# Patient Record
Sex: MALE | Employment: FULL TIME | URBAN - METROPOLITAN AREA
[De-identification: names, ages, dates, MRNs, and addresses within clinical notes are randomized per-mention and may not be internally consistent; named-entity substitution may affect disease eponyms.]

---

## 2017-03-04 ENCOUNTER — APPOINTMENT (OUTPATIENT)
Dept: GENERAL RADIOLOGY | Age: 37
End: 2017-03-04
Attending: EMERGENCY MEDICINE
Payer: COMMERCIAL

## 2017-03-04 ENCOUNTER — HOSPITAL ENCOUNTER (EMERGENCY)
Age: 37
Discharge: HOME | End: 2017-03-04
Attending: EMERGENCY MEDICINE
Payer: COMMERCIAL

## 2017-03-04 VITALS
TEMPERATURE: 98.8 F | OXYGEN SATURATION: 93 % | SYSTOLIC BLOOD PRESSURE: 108 MMHG | HEIGHT: 75 IN | WEIGHT: 209 LBS | DIASTOLIC BLOOD PRESSURE: 69 MMHG | RESPIRATION RATE: 16 BRPM | HEART RATE: 82 BPM | BODY MASS INDEX: 25.99 KG/M2

## 2017-03-04 DIAGNOSIS — S22.41XA CLOSED FRACTURE OF MULTIPLE RIBS OF RIGHT SIDE, INITIAL ENCOUNTER: ICD-10-CM

## 2017-03-04 DIAGNOSIS — S52.571A OTHER CLOSED INTRA-ARTICULAR FRACTURE OF DISTAL END OF RIGHT RADIUS, INITIAL ENCOUNTER: Primary | ICD-10-CM

## 2017-03-04 DIAGNOSIS — S82.54XA CLOSED NONDISPLACED FRACTURE OF MEDIAL MALLEOLUS OF RIGHT TIBIA, INITIAL ENCOUNTER: ICD-10-CM

## 2017-03-04 LAB
ALBUMIN SERPL BCP-MCNC: 4 G/DL (ref 3.5–5)
ALBUMIN/GLOB SERPL: 1.2 {RATIO} (ref 1.2–3.5)
ALP SERPL-CCNC: 59 U/L (ref 50–136)
ALT SERPL-CCNC: 30 U/L (ref 12–65)
ANION GAP BLD CALC-SCNC: 9 MMOL/L (ref 7–16)
AST SERPL W P-5'-P-CCNC: 23 U/L (ref 15–37)
BASOPHILS # BLD AUTO: 0.1 K/UL (ref 0–0.2)
BASOPHILS # BLD: 1 % (ref 0–2)
BILIRUB SERPL-MCNC: 0.9 MG/DL (ref 0.2–1.1)
BUN SERPL-MCNC: 8 MG/DL (ref 6–23)
CALCIUM SERPL-MCNC: 8.9 MG/DL (ref 8.3–10.4)
CHLORIDE SERPL-SCNC: 109 MMOL/L (ref 98–107)
CO2 SERPL-SCNC: 26 MMOL/L (ref 21–32)
CREAT SERPL-MCNC: 1 MG/DL (ref 0.8–1.5)
DIFFERENTIAL METHOD BLD: ABNORMAL
EOSINOPHIL # BLD: 0.5 K/UL (ref 0–0.8)
EOSINOPHIL NFR BLD: 3 % (ref 0.5–7.8)
ERYTHROCYTE [DISTWIDTH] IN BLOOD BY AUTOMATED COUNT: 13.5 % (ref 11.9–14.6)
ETHANOL SERPL-MCNC: 94 MG/DL
GLOBULIN SER CALC-MCNC: 3.4 G/DL (ref 2.3–3.5)
GLUCOSE SERPL-MCNC: 102 MG/DL (ref 65–100)
HCT VFR BLD AUTO: 45.4 % (ref 41.1–50.3)
HGB BLD-MCNC: 16.5 G/DL (ref 13.6–17.2)
IMM GRANULOCYTES # BLD: 0.1 K/UL (ref 0–0.5)
IMM GRANULOCYTES NFR BLD AUTO: 0.5 % (ref 0–5)
LYMPHOCYTES # BLD AUTO: 33 % (ref 13–44)
LYMPHOCYTES # BLD: 6.2 K/UL (ref 0.5–4.6)
MCH RBC QN AUTO: 34.3 PG (ref 26.1–32.9)
MCHC RBC AUTO-ENTMCNC: 36.3 G/DL (ref 31.4–35)
MCV RBC AUTO: 94.4 FL (ref 79.6–97.8)
MONOCYTES # BLD: 0.8 K/UL (ref 0.1–1.3)
MONOCYTES NFR BLD AUTO: 4 % (ref 4–12)
NEUTS SEG # BLD: 11.2 K/UL (ref 1.7–8.2)
NEUTS SEG NFR BLD AUTO: 59 % (ref 43–78)
PLATELET # BLD AUTO: 520 K/UL (ref 150–450)
PMV BLD AUTO: 9.2 FL (ref 10.8–14.1)
POTASSIUM SERPL-SCNC: 3.4 MMOL/L (ref 3.5–5.1)
PROT SERPL-MCNC: 7.4 G/DL (ref 6.3–8.2)
RBC # BLD AUTO: 4.81 M/UL (ref 4.23–5.67)
SODIUM SERPL-SCNC: 144 MMOL/L (ref 136–145)
WBC # BLD AUTO: 19 K/UL (ref 4.3–11.1)

## 2017-03-04 PROCEDURE — 80053 COMPREHEN METABOLIC PANEL: CPT | Performed by: EMERGENCY MEDICINE

## 2017-03-04 PROCEDURE — 74011250636 HC RX REV CODE- 250/636: Performed by: EMERGENCY MEDICINE

## 2017-03-04 PROCEDURE — 74011250637 HC RX REV CODE- 250/637: Performed by: EMERGENCY MEDICINE

## 2017-03-04 PROCEDURE — 80307 DRUG TEST PRSMV CHEM ANLYZR: CPT | Performed by: EMERGENCY MEDICINE

## 2017-03-04 PROCEDURE — 77030008298 HC SPLNT FBRGLS SCTCH 3M -A

## 2017-03-04 PROCEDURE — 74011250636 HC RX REV CODE- 250/636

## 2017-03-04 PROCEDURE — 85025 COMPLETE CBC W/AUTO DIFF WBC: CPT | Performed by: EMERGENCY MEDICINE

## 2017-03-04 PROCEDURE — 75810000053 HC SPLINT APPLICATION: Performed by: EMERGENCY MEDICINE

## 2017-03-04 PROCEDURE — 99284 EMERGENCY DEPT VISIT MOD MDM: CPT | Performed by: EMERGENCY MEDICINE

## 2017-03-04 PROCEDURE — 96374 THER/PROPH/DIAG INJ IV PUSH: CPT | Performed by: EMERGENCY MEDICINE

## 2017-03-04 PROCEDURE — 73090 X-RAY EXAM OF FOREARM: CPT

## 2017-03-04 PROCEDURE — 71101 X-RAY EXAM UNILAT RIBS/CHEST: CPT

## 2017-03-04 PROCEDURE — 96376 TX/PRO/DX INJ SAME DRUG ADON: CPT | Performed by: EMERGENCY MEDICINE

## 2017-03-04 PROCEDURE — 73110 X-RAY EXAM OF WRIST: CPT

## 2017-03-04 PROCEDURE — 96375 TX/PRO/DX INJ NEW DRUG ADDON: CPT | Performed by: EMERGENCY MEDICINE

## 2017-03-04 PROCEDURE — 73610 X-RAY EXAM OF ANKLE: CPT

## 2017-03-04 RX ORDER — OXYCODONE HYDROCHLORIDE 5 MG/1
10 TABLET ORAL
Qty: 20 TAB | Refills: 0 | Status: SHIPPED | OUTPATIENT
Start: 2017-03-04 | End: 2017-03-08

## 2017-03-04 RX ORDER — ONDANSETRON 2 MG/ML
4 INJECTION INTRAMUSCULAR; INTRAVENOUS
Status: COMPLETED | OUTPATIENT
Start: 2017-03-04 | End: 2017-03-04

## 2017-03-04 RX ORDER — HYDROMORPHONE HYDROCHLORIDE 1 MG/ML
1 INJECTION, SOLUTION INTRAMUSCULAR; INTRAVENOUS; SUBCUTANEOUS
Status: COMPLETED | OUTPATIENT
Start: 2017-03-04 | End: 2017-03-04

## 2017-03-04 RX ORDER — OXYCODONE HYDROCHLORIDE 15 MG/1
15 TABLET ORAL
Status: COMPLETED | OUTPATIENT
Start: 2017-03-04 | End: 2017-03-04

## 2017-03-04 RX ORDER — ONDANSETRON 2 MG/ML
INJECTION INTRAMUSCULAR; INTRAVENOUS
Status: COMPLETED
Start: 2017-03-04 | End: 2017-03-04

## 2017-03-04 RX ORDER — HYDROMORPHONE HYDROCHLORIDE 1 MG/ML
INJECTION, SOLUTION INTRAMUSCULAR; INTRAVENOUS; SUBCUTANEOUS
Status: COMPLETED
Start: 2017-03-04 | End: 2017-03-04

## 2017-03-04 RX ADMIN — OXYCODONE HYDROCHLORIDE 15 MG: 15 TABLET ORAL at 17:57

## 2017-03-04 RX ADMIN — ONDANSETRON 4 MG: 2 INJECTION, SOLUTION INTRAMUSCULAR; INTRAVENOUS at 16:07

## 2017-03-04 RX ADMIN — HYDROMORPHONE HYDROCHLORIDE 1 MG: 1 INJECTION, SOLUTION INTRAMUSCULAR; INTRAVENOUS; SUBCUTANEOUS at 17:08

## 2017-03-04 RX ADMIN — HYDROMORPHONE HYDROCHLORIDE 1 MG: 1 INJECTION, SOLUTION INTRAMUSCULAR; INTRAVENOUS; SUBCUTANEOUS at 16:08

## 2017-03-04 RX ADMIN — ONDANSETRON 4 MG: 2 INJECTION INTRAMUSCULAR; INTRAVENOUS at 16:07

## 2017-03-04 NOTE — DISCHARGE INSTRUCTIONS
Rest.  Elevate arm and leg when sitting or lying down. Ice if needed. Pain medication as needed. Call orthopedist Monday for appointment for follow-up. May use pillow to help splint chest wall when coughing or taking a deep breath. Recheck sooner for shortness of breath coughing up blood fever or much worse pain. Broken Wrist: Care Instructions  Your Care Instructions    Your wrist can break, or fracture, during sports, a fall, or other accidents. The break may happen when your wrist is hit or is used to protect you in a fall. Fractures can range from a small, hairline crack, to a bone or bones broken into two or more pieces. Your treatment depends on how bad the break is. Your doctor may have put your wrist in a cast or splint. This will help keep your wrist stable until your follow-up appointment. It may take weeks or months for your wrist to heal. You can help it heal with care at home. You heal best when you take good care of yourself. Eat a variety of healthy foods, and don't smoke. Follow-up care is a key part of your treatment and safety. Be sure to make and go to all appointments, and call your doctor if you are having problems. It's also a good idea to know your test results and keep a list of the medicines you take. How can you care for yourself at home? · Put ice or a cold pack on your wrist for 10 to 20 minutes at a time. Try to do this every 1 to 2 hours for the next 3 days (when you are awake). Put a thin cloth between the ice and your cast or splint. Keep your cast or splint dry. · Follow the splint or cast care instructions your doctor gives you. If you have a splint, do not take it off unless your doctor tells you to. Be careful not to put the splint on too tight. · Be safe with medicines. Take pain medicines exactly as directed. ¨ If the doctor gave you a prescription medicine for pain, take it as prescribed.   ¨ If you are not taking a prescription pain medicine, ask your doctor if you can take an over-the-counter medicine. · Prop up your wrist on pillows when you sit or lie down in the first few days after the injury. Keep your wrist higher than the level of your heart. This will help reduce swelling. · Move your fingers often to reduce swelling and stiffness, but do not use that hand to grab or carry anything. · Follow instructions for exercises to keep your arm strong. When should you call for help? Call your doctor now or seek immediate medical care if:  · You have increased or severe pain. · Your cast or splint feels too tight. · You cannot move your fingers. · You have tingling, weakness, or numbness in your hand and fingers. · Your hand and fingers are cool or pale or change color. · You have a lot of swelling near your cast or splint. · The skin under your cast or splint is burning or stinging. Watch closely for changes in your health, and be sure to contact your doctor if:  · You do not get better as expected. Where can you learn more? Go to http://ju-caitlyn.info/. Enter [id number] in the search box to learn more about \"Broken Wrist: Care Instructions. \"  Current as of: May 23, 2016  Content Version: 11.1  © 9533-7768 [employer]. Care instructions adapted under license by [employer] (which disclaims liability or warranty for this information). If you have questions about a medical condition or this instruction, always ask your healthcare professional. [place] [address] any warranty or liability for your use of this information. Broken Ankle: Care Instructions  Your Care Instructions    An ankle may break (fracture) during sports, a fall, or other accidents. Fractures can range from a small, hairline crack, to a bone or bones broken into two or more pieces. Your treatment depends on how bad the break is.   Your doctor may have put your ankle in a splint or cast to allow it to heal or to keep it stable until you see another doctor. It may take weeks or months for your ankle to heal. You can help your ankle heal with some care at home. You heal best when you take good care of yourself. Eat a variety of healthy foods, and don't smoke. You may have had a sedative to help you relax. You may be unsteady after having sedation. It can take a few hours for the medicine's effects to wear off. Common side effects of sedation include nausea, vomiting, and feeling sleepy or tired. The doctor has checked you carefully, but problems can develop later. If you notice any problems or new symptoms, get medical treatment right away. Follow-up care is a key part of your treatment and safety. Be sure to make and go to all appointments, and call your doctor if you are having problems. It's also a good idea to know your test results and keep a list of the medicines you take. How can you care for yourself at home? · If the doctor gave you a sedative:  ¨ For 24 hours, don't do anything that requires attention to detail. It takes time for the medicine's effects to completely wear off. ¨ For your safety, do not drive or operate any machinery that could be dangerous. Wait until the medicine wears off and you can think clearly and react easily. · Put ice or a cold pack on your ankle for 10 to 20 minutes at a time. Try to do this every 1 to 2 hours for the next 3 days (when you are awake). Put a thin cloth between the ice and your cast or splint. Keep your cast or splint dry. · Follow the cast care instructions your doctor gives you. If you have a splint, do not take it off unless your doctor tells you to. · Be safe with medicines. Take pain medicines exactly as directed. ¨ If the doctor gave you a prescription medicine for pain, take it as prescribed. ¨ If you are not taking a prescription pain medicine, ask your doctor if you can take an over-the-counter medicine.   · Prop up your leg on pillows in the first few days after the injury. Keep the ankle higher than the level of your heart. This will help reduce swelling. · Do not put weight on your ankle unless your doctor tells you to. Use crutches to walk. · Follow instructions for exercises to keep your leg strong. · Wiggle your toes often to reduce swelling and stiffness. When should you call for help? Call 911 anytime you think you may need emergency care. For example, call if:  · You have trouble breathing. · You passed out (lost consciousness). · You have sudden chest pain and shortness of breath, or you cough up blood. Call your doctor now or seek immediate medical care if:  · You have new or worse nausea or vomiting. · You have increased or severe pain. · Your foot is cool or pale or changes color. · You have tingling, weakness, or numbness in your toes. · Your cast or splint feels too tight. · You cannot move your toes. · You have signs of a blood clot, such as:  ¨ Pain in your calf, back of the knee, thigh, or groin. ¨ Redness and swelling in your leg or groin. · The skin under your cast or splint is burning or stinging. Watch closely for changes in your health, and be sure to contact your doctor if:  · You do not get better as expected. Where can you learn more? Go to http://ju-caitlyn.info/. Enter P763 in the search box to learn more about \"Broken Ankle: Care Instructions. \"  Current as of: May 23, 2016  Content Version: 11.1  © 4537-2550 [employer]. Care instructions adapted under license by [employer] (which disclaims liability or warranty for this information). If you have questions about a medical condition or this instruction, always ask your healthcare professional. [place] [address] any warranty or liability for your use of this information. Broken Rib: Care Instructions  Your Care Instructions    A broken rib is a crack or break in one of the bones of the rib cage.  Breathing can be very painful because the muscles used for breathing pull on the rib. In most cases, a broken rib will heal on its own. You can take pain medicine while the rib mends. Pain relief allows you to take deep breaths. In the past, doctors recommended taping or wrapping broken ribs. This is no longer done because taping makes it hard for you to take deep breaths. You need to take deep breaths at least once an hour to prevent pneumonia or a partial collapse of a lung. Your rib will heal in about 6 weeks. You heal best when you take good care of yourself. Eat a variety of healthy foods, and don't smoke. Follow-up care is a key part of your treatment and safety. Be sure to make and go to all appointments, and call your doctor if you are having problems. It's also a good idea to know your test results and keep a list of the medicines you take. How can you care for yourself at home? · Be safe with medicines. Take pain medicines exactly as directed. ¨ If the doctor gave you a prescription medicine for pain, take it as prescribed. ¨ If you are not taking a prescription pain medicine, ask your doctor if you can take an over-the-counter medicine. · Even if it hurts, cough or take the deepest breath you can at least once every hour. This will get air deeply into your lungs and reduce your chance of getting pneumonia or a partial collapse of a lung. Hold a pillow against your chest to make this less painful. · Put ice or a cold pack on the area for 10 to 20 minutes at a time. Put a thin cloth between the ice and your skin. When should you call for help? Call 911 anytime you think you may need emergency care. For example, call if:  · You have sudden chest pain and shortness of breath, or you cough up blood. Call your doctor now or seek immediate medical care if:  · You have some trouble breathing. · You have a fever. · You have a cough that gets worse.  For example, you have a dry cough that turns into a wet cough, bringing up mucus from the lungs. Watch closely for changes in your health, and be sure to contact your doctor if:  · You have pain even after taking your medicine. · You do not get better as expected. Where can you learn more? Go to http://ju-caitlyn.info/. Enter M135 in the search box to learn more about \"Broken Rib: Care Instructions. \"  Current as of: May 27, 2016  Content Version: 11.1  © 7199-0855 [employer]. Care instructions adapted under license by [employer] (which disclaims liability or warranty for this information). If you have questions about a medical condition or this instruction, always ask your healthcare professional. [address] any warranty or liability for your use of this information.

## 2017-03-04 NOTE — ED NOTES
I have reviewed discharge instructions with the patient. The patient verbalized understanding. Prescription given to pt. Patient's father transporting pt home.

## 2017-03-04 NOTE — ED TRIAGE NOTES
Reports fell about 20 feet out of a barn. Reports pain to right side of ribs, right wrist, and right ankle. Denies neck or back pain. Has Spirocytosis.

## 2017-03-04 NOTE — ED PROVIDER NOTES
HPI Comments: 43-year-old male fell about 20 feet out of a loft of a barn. Landed on his right side. Denies any head impact or loss of consciousness. Complains of pain to the right forearm and wrist, right ribs, right ankle. Denies any back anterior chest or abdominal pain. Pain mildly worse with breathing. Has a history of splenectomy due to a hereditary spherocytosis. Patient is a 29 y.o. male presenting with fall. The history is provided by the patient. Fall   The accident occurred less than 1 hour ago. The fall occurred while standing. He fell from a height of 16 - 20 ft. He landed on concrete. There was no blood loss. The point of impact was the right wrist. The pain is moderate. He was ambulatory at the scene. Pertinent negatives include no fever, no numbness, no abdominal pain, no nausea, no vomiting, no hematuria, no headaches, no extremity weakness, no loss of consciousness, no tingling and no laceration. He has tried nothing for the symptoms. Past Medical History:   Diagnosis Date    Hypertension     NSTEMI (non-ST elevated myocardial infarction) ([facility] [address] 8/14/2014    Other ill-defined conditions(799.89)     spirocytosis, high cholesterol    URI (upper respiratory infection) 8/14/2014       Past Surgical History:   Procedure Laterality Date    ABDOMEN SURGERY PROC UNLISTED      spleen    HX APPENDECTOMY      HX CHOLECYSTECTOMY      HX GI      spleen removed    HX HEENT      tonsils         Family History:   Problem Relation Age of Onset    Heart Disease Mother     Hypertension Mother     Heart Disease Father     Hypertension Father     Stroke Maternal Grandfather     Heart Disease Paternal Grandfather     Diabetes Paternal Grandfather        Social History     Social History    Marital status:      Spouse name: N/A    Number of children: N/A    Years of education: N/A     Occupational History    Not on file.      Social History Main Topics    Smoking status: Current Every Day Smoker     Packs/day: 2.00     Years: 20.00    Smokeless tobacco: Never Used    Alcohol use 0.0 oz/week      Comment: occasionally    Drug use: No    Sexual activity: Yes     Partners: Female     Other Topics Concern    Not on file     Social History Narrative         ALLERGIES: Aspirin    Review of Systems   Constitutional: Negative for chills and fever. HENT: Negative for ear pain. Eyes: Negative for visual disturbance. Respiratory: Negative for shortness of breath. Cardiovascular: Negative for chest pain. Gastrointestinal: Negative for abdominal pain, nausea and vomiting. Genitourinary: Positive for flank pain. Negative for hematuria. Musculoskeletal: Negative for back pain, extremity weakness and neck pain. Neurological: Negative for dizziness, tingling, loss of consciousness, light-headedness, numbness and headaches. Vitals:    03/04/17 1554   BP: 135/69   Pulse: (!) 101   Resp: 20   Temp: 98.8 °F (37.1 °C)   Weight: 94.8 kg (209 lb)   Height: 6' 2.5\" (1.892 m)            Physical Exam   Constitutional: He is oriented to person, place, and time. He appears well-developed and well-nourished. No distress. HENT:   Head: Normocephalic and atraumatic. Mouth/Throat: Oropharynx is clear and moist.   Eyes: EOM are normal. Pupils are equal, round, and reactive to light. Neck: Normal range of motion. Neck supple. No muscular tenderness present. Cardiovascular: Normal rate, regular rhythm and normal heart sounds. Pulmonary/Chest: Effort normal and breath sounds normal.       Abdominal: Soft. He exhibits no distension. There is no tenderness. Musculoskeletal:        Right shoulder: Normal.        Right elbow: Normal.       Right wrist: He exhibits decreased range of motion, tenderness, bony tenderness and swelling. Right hip: Normal.        Right knee: Normal.        Right ankle: He exhibits decreased range of motion and swelling. He exhibits normal pulse. Tenderness. Lateral malleolus and medial malleolus tenderness found. Left forearm: He exhibits tenderness and bony tenderness. He exhibits no swelling and no laceration. Neurological: He is alert and oriented to person, place, and time. Speech normal   Skin: No laceration noted. Nursing note and vitals reviewed. MDM  Number of Diagnoses or Management Options  Diagnosis management comments: No obvious evidence for head injury or intrathoracic or intra-abdominal injuries. Will reexamine. At this point concern for fractured right wrist and ankle. Also films of right ribs to assess for rib fractures and pneumothorax. Pain control. Amount and/or Complexity of Data Reviewed  Clinical lab tests: ordered and reviewed  Tests in the radiology section of CPT®: ordered and reviewed  Independent visualization of images, tracings, or specimens: yes    Risk of Complications, Morbidity, and/or Mortality  Presenting problems: moderate  Diagnostic procedures: low  Management options: moderate    Patient Progress  Patient progress: stable    ED Course       Procedures    Xr Forearm Rt Ap/lat    Result Date: 3/4/2017  Exam:  Right forearm and wrist radiographs History:  pain, R arm pain, 36 years Male Reports fell about 20 feet out of a barn. Reports pain to right side of ribs, right wrist, and right ankle Pt. Unable to have complete forearm lateral, had to do elbow and wrist lateral separately Comparison: None available Findings: There is a comminuted essentially nondisplaced fracture of the distal radial epiphysis extending into the metaphysis and radiocarpal joint. The visualized carpal bones appear otherwise intact. The visualized ulna and proximal radius appear intact. Normal alignment. Normal mineralization. Visualized soft tissues otherwise unremarkable. Impression: Acute comminuted nondisplaced fracture of the distal radial epiphysis.      Xr Wrist Rt Ap/lat/obl Min 3v    Result Date: 3/4/2017  Exam:  Right forearm and wrist radiographs History:  pain, R arm pain, 36 years Male Reports fell about 20 feet out of a barn. Reports pain to right side of ribs, right wrist, and right ankle Pt. Unable to have complete forearm lateral, had to do elbow and wrist lateral separately Comparison: None available Findings: There is a comminuted essentially nondisplaced fracture of the distal radial epiphysis extending into the metaphysis and radiocarpal joint. The visualized carpal bones appear otherwise intact. The visualized ulna and proximal radius appear intact. Normal alignment. Normal mineralization. Visualized soft tissues otherwise unremarkable. Impression: Acute comminuted nondisplaced fracture of the distal radial epiphysis. Xr Ankle Rt Min 3 V    Result Date: 3/4/2017  Exam:  Right ankle radiographs History:  pain, ankle trauma, 29 years Male Reports fell about 20 feet out of a barn. Reports pain to right side of ribs, right wrist, and right ankle Comparison: None available Findings: There appears to be an acute nondisplaced fracture of the medial malleolus, with associated mild medial ankle soft tissue swelling. The visualized distal tibia and talar dome appear intact. Normal alignment. Normal mineralization. Possible small ankle joint effusion. Visualized soft tissues otherwise unremarkable. Impression: Acute nondisplaced fracture of the medial malleolus. Xr Ribs Lt W Pa Cxr Min 3 V    Result Date: 3/4/2017  PA chest and right ribs radiographs History: Right chest injury, 29 years Male Reports fell about 20 feet out of a barn. Reports pain to right side of ribs, right wrist, and right ankle Best images possible, pt. In a lot of pain, limited movement Comparison: Chest radiograph January 11, 2016 Findings:   Normal cardiomediastinal silhouette. Decreased lung volumes. Mild subsegmental atelectasis bilateral lung bases.   No evidence of pneumothorax, pleural effusion, or air space consolidation. There appear to be acute nondisplaced fractures of the lateral right seventh through 10th ribs. Visualized soft tissue and osseous structures otherwise unremarkable. Impression: Decreased lung volumes. Acute nondisplaced fractures of the right lateral seventh through 10th ribs. Results Include:    Recent Results (from the past 24 hour(s))   CBC WITH AUTOMATED DIFF    Collection Time: 03/04/17  4:00 PM   Result Value Ref Range    WBC 19.0 (H) 4.3 - 11.1 K/uL    RBC 4.81 4.23 - 5.67 M/uL    HGB 16.5 13.6 - 17.2 g/dL    HCT 45.4 41.1 - 50.3 %    MCV 94.4 79.6 - 97.8 FL    MCH 34.3 (H) 26.1 - 32.9 PG    MCHC 36.3 (H) 31.4 - 35.0 g/dL    RDW 13.5 11.9 - 14.6 %    PLATELET 693 (H) 098 - 450 K/uL    MPV 9.2 (L) 10.8 - 14.1 FL    DF AUTOMATED      NEUTROPHILS 59 43 - 78 %    LYMPHOCYTES 33 13 - 44 %    MONOCYTES 4 4.0 - 12.0 %    EOSINOPHILS 3 0.5 - 7.8 %    BASOPHILS 1 0.0 - 2.0 %    IMMATURE GRANULOCYTES 0.5 0.0 - 5.0 %    ABS. NEUTROPHILS 11.2 (H) 1.7 - 8.2 K/UL    ABS. LYMPHOCYTES 6.2 (H) 0.5 - 4.6 K/UL    ABS. MONOCYTES 0.8 0.1 - 1.3 K/UL    ABS. EOSINOPHILS 0.5 0.0 - 0.8 K/UL    ABS. BASOPHILS 0.1 0.0 - 0.2 K/UL    ABS. IMM. GRANS. 0.1 0.0 - 0.5 K/UL   METABOLIC PANEL, COMPREHENSIVE    Collection Time: 03/04/17  4:00 PM   Result Value Ref Range    Sodium 144 136 - 145 mmol/L    Potassium 3.4 (L) 3.5 - 5.1 mmol/L    Chloride 109 (H) 98 - 107 mmol/L    CO2 26 21 - 32 mmol/L    Anion gap 9 7 - 16 mmol/L    Glucose 102 (H) 65 - 100 mg/dL    BUN 8 6 - 23 MG/DL    Creatinine 1.00 0.8 - 1.5 MG/DL    GFR est AA >60 >60 ml/min/1.73m2    GFR est non-AA >60 >60 ml/min/1.73m2    Calcium 8.9 8.3 - 10.4 MG/DL    Bilirubin, total 0.9 0.2 - 1.1 MG/DL    ALT (SGPT) 30 12 - 65 U/L    AST (SGOT) 23 15 - 37 U/L    Alk.  phosphatase 59 50 - 136 U/L    Protein, total 7.4 6.3 - 8.2 g/dL    Albumin 4.0 3.5 - 5.0 g/dL    Globulin 3.4 2.3 - 3.5 g/dL    A-G Ratio 1.2 1.2 - 3.5     ETHYL ALCOHOL    Collection Time: 03/04/17  4:00 PM   Result Value Ref Range    ALCOHOL(ETHYL),SERUM 94 MG/DL     Spoke with orthopedics, they desire stirrup splint and sugar tong splint. Follow-up with office the beginning of the week. Suspect he will need wrist surgery.

## 2017-03-07 ENCOUNTER — ANESTHESIA EVENT (OUTPATIENT)
Dept: SURGERY | Age: 37
End: 2017-03-07
Payer: COMMERCIAL

## 2017-03-08 ENCOUNTER — SURGERY (OUTPATIENT)
Age: 37
End: 2017-03-08

## 2017-03-08 ENCOUNTER — APPOINTMENT (OUTPATIENT)
Dept: GENERAL RADIOLOGY | Age: 37
End: 2017-03-08
Attending: ORTHOPAEDIC SURGERY
Payer: COMMERCIAL

## 2017-03-08 ENCOUNTER — HOSPITAL ENCOUNTER (OUTPATIENT)
Age: 37
Setting detail: OUTPATIENT SURGERY
Discharge: HOME | End: 2017-03-08
Attending: ORTHOPAEDIC SURGERY | Admitting: ORTHOPAEDIC SURGERY
Payer: COMMERCIAL

## 2017-03-08 ENCOUNTER — ANESTHESIA (OUTPATIENT)
Dept: SURGERY | Age: 37
End: 2017-03-08
Payer: COMMERCIAL

## 2017-03-08 VITALS
WEIGHT: 198 LBS | SYSTOLIC BLOOD PRESSURE: 110 MMHG | RESPIRATION RATE: 16 BRPM | DIASTOLIC BLOOD PRESSURE: 68 MMHG | BODY MASS INDEX: 24.1 KG/M2 | OXYGEN SATURATION: 95 % | HEART RATE: 78 BPM | TEMPERATURE: 97.7 F

## 2017-03-08 PROCEDURE — 77030002966 HC SUT PDS J&J -A: Performed by: ORTHOPAEDIC SURGERY

## 2017-03-08 PROCEDURE — 74011250637 HC RX REV CODE- 250/637: Performed by: ANESTHESIOLOGY

## 2017-03-08 PROCEDURE — 77030018836 HC SOL IRR NACL ICUM -A: Performed by: ORTHOPAEDIC SURGERY

## 2017-03-08 PROCEDURE — 76060000034 HC ANESTHESIA 1.5 TO 2 HR: Performed by: ORTHOPAEDIC SURGERY

## 2017-03-08 PROCEDURE — 74011000250 HC RX REV CODE- 250

## 2017-03-08 PROCEDURE — 77030020275 HC MISC ORTHOPEDIC: Performed by: ORTHOPAEDIC SURGERY

## 2017-03-08 PROCEDURE — C1713 ANCHOR/SCREW BN/BN,TIS/BN: HCPCS | Performed by: ORTHOPAEDIC SURGERY

## 2017-03-08 PROCEDURE — 76210000006 HC OR PH I REC 0.5 TO 1 HR: Performed by: ORTHOPAEDIC SURGERY

## 2017-03-08 PROCEDURE — 74011250636 HC RX REV CODE- 250/636

## 2017-03-08 PROCEDURE — 77030003602 HC NDL NRV BLK BBMI -B: Performed by: ANESTHESIOLOGY

## 2017-03-08 PROCEDURE — 77030027789 HC PLT VOLR DST PEG TRIM -G: Performed by: ORTHOPAEDIC SURGERY

## 2017-03-08 PROCEDURE — 77030020274 HC MISC IMPL ORTHOPEDIC: Performed by: ORTHOPAEDIC SURGERY

## 2017-03-08 PROCEDURE — 77030020754 HC CUF TRNQT 2BLA STRY -B: Performed by: ORTHOPAEDIC SURGERY

## 2017-03-08 PROCEDURE — 74011250636 HC RX REV CODE- 250/636: Performed by: ANESTHESIOLOGY

## 2017-03-08 PROCEDURE — 77030008367 HC SPLNT ORTHGLS BSNM -A: Performed by: ORTHOPAEDIC SURGERY

## 2017-03-08 PROCEDURE — 77030011640 HC PAD GRND REM COVD -A: Performed by: ORTHOPAEDIC SURGERY

## 2017-03-08 PROCEDURE — 77030021774 HC PLT PEG WRST TRIM -B: Performed by: ORTHOPAEDIC SURGERY

## 2017-03-08 PROCEDURE — 77030002916 HC SUT ETHLN J&J -A: Performed by: ORTHOPAEDIC SURGERY

## 2017-03-08 PROCEDURE — 74011250636 HC RX REV CODE- 250/636: Performed by: ORTHOPAEDIC SURGERY

## 2017-03-08 PROCEDURE — 76010000161 HC OR TIME 1 TO 1.5 HR INTENSV-TIER 1: Performed by: ORTHOPAEDIC SURGERY

## 2017-03-08 PROCEDURE — 76942 ECHO GUIDE FOR BIOPSY: CPT | Performed by: ORTHOPAEDIC SURGERY

## 2017-03-08 PROCEDURE — 76010010054 HC POST OP PAIN BLOCK: Performed by: ORTHOPAEDIC SURGERY

## 2017-03-08 PROCEDURE — 76210000020 HC REC RM PH II FIRST 0.5 HR: Performed by: ORTHOPAEDIC SURGERY

## 2017-03-08 PROCEDURE — 73110 X-RAY EXAM OF WRIST: CPT

## 2017-03-08 DEVICE — PLATE VOLAR 4 DIS PEG 3HLE LF --: Type: IMPLANTABLE DEVICE | Site: ARM | Status: FUNCTIONAL

## 2017-03-08 DEVICE — PEG WRST THRD VOLAR 16MM --: Type: IMPLANTABLE DEVICE | Site: ARM | Status: FUNCTIONAL

## 2017-03-08 DEVICE — SCR BNE CORT HEX 3.2X14MM --: Type: IMPLANTABLE DEVICE | Site: ARM | Status: FUNCTIONAL

## 2017-03-08 DEVICE — IMPLANTABLE DEVICE: Type: IMPLANTABLE DEVICE | Site: ARM | Status: FUNCTIONAL

## 2017-03-08 DEVICE — PEG WRST THRD VOLAR 20MM --: Type: IMPLANTABLE DEVICE | Site: ARM | Status: FUNCTIONAL

## 2017-03-08 DEVICE — PEG WRST THRD VOLAR 18MM --: Type: IMPLANTABLE DEVICE | Site: ARM | Status: FUNCTIONAL

## 2017-03-08 RX ORDER — HYDROCODONE BITARTRATE AND ACETAMINOPHEN 7.5; 325 MG/1; MG/1
1 TABLET ORAL AS NEEDED
Status: DISCONTINUED | OUTPATIENT
Start: 2017-03-08 | End: 2017-03-08 | Stop reason: HOSPADM

## 2017-03-08 RX ORDER — MIDAZOLAM HYDROCHLORIDE 1 MG/ML
2 INJECTION, SOLUTION INTRAMUSCULAR; INTRAVENOUS
Status: DISCONTINUED | OUTPATIENT
Start: 2017-03-08 | End: 2017-03-08 | Stop reason: HOSPADM

## 2017-03-08 RX ORDER — SODIUM CHLORIDE 0.9 % (FLUSH) 0.9 %
5-10 SYRINGE (ML) INJECTION AS NEEDED
Status: DISCONTINUED | OUTPATIENT
Start: 2017-03-08 | End: 2017-03-08 | Stop reason: HOSPADM

## 2017-03-08 RX ORDER — FAMOTIDINE 20 MG/1
20 TABLET, FILM COATED ORAL ONCE
Status: COMPLETED | OUTPATIENT
Start: 2017-03-08 | End: 2017-03-08

## 2017-03-08 RX ORDER — HYDROMORPHONE HYDROCHLORIDE 2 MG/ML
0.5 INJECTION, SOLUTION INTRAMUSCULAR; INTRAVENOUS; SUBCUTANEOUS
Status: DISCONTINUED | OUTPATIENT
Start: 2017-03-08 | End: 2017-03-08 | Stop reason: HOSPADM

## 2017-03-08 RX ORDER — METOPROLOL TARTRATE 50 MG/1
50 TABLET ORAL ONCE
Status: COMPLETED | OUTPATIENT
Start: 2017-03-08 | End: 2017-03-08

## 2017-03-08 RX ORDER — SODIUM CHLORIDE 0.9 % (FLUSH) 0.9 %
5-10 SYRINGE (ML) INJECTION EVERY 8 HOURS
Status: DISCONTINUED | OUTPATIENT
Start: 2017-03-08 | End: 2017-03-08 | Stop reason: HOSPADM

## 2017-03-08 RX ORDER — PROPOFOL 10 MG/ML
INJECTION, EMULSION INTRAVENOUS
Status: DISCONTINUED | OUTPATIENT
Start: 2017-03-08 | End: 2017-03-08 | Stop reason: HOSPADM

## 2017-03-08 RX ORDER — FENTANYL CITRATE 50 UG/ML
INJECTION, SOLUTION INTRAMUSCULAR; INTRAVENOUS AS NEEDED
Status: DISCONTINUED | OUTPATIENT
Start: 2017-03-08 | End: 2017-03-08 | Stop reason: HOSPADM

## 2017-03-08 RX ORDER — CEFAZOLIN SODIUM IN 0.9 % NACL 2 G/50 ML
2 INTRAVENOUS SOLUTION, PIGGYBACK (ML) INTRAVENOUS ONCE
Status: COMPLETED | OUTPATIENT
Start: 2017-03-08 | End: 2017-03-08

## 2017-03-08 RX ORDER — SODIUM CHLORIDE 9 MG/ML
25 INJECTION, SOLUTION INTRAVENOUS CONTINUOUS
Status: DISCONTINUED | OUTPATIENT
Start: 2017-03-08 | End: 2017-03-08 | Stop reason: HOSPADM

## 2017-03-08 RX ORDER — SODIUM CHLORIDE, SODIUM LACTATE, POTASSIUM CHLORIDE, CALCIUM CHLORIDE 600; 310; 30; 20 MG/100ML; MG/100ML; MG/100ML; MG/100ML
100 INJECTION, SOLUTION INTRAVENOUS CONTINUOUS
Status: DISCONTINUED | OUTPATIENT
Start: 2017-03-08 | End: 2017-03-08 | Stop reason: HOSPADM

## 2017-03-08 RX ORDER — OXYCODONE HYDROCHLORIDE 5 MG/1
5 TABLET ORAL
Status: DISCONTINUED | OUTPATIENT
Start: 2017-03-08 | End: 2017-03-08 | Stop reason: HOSPADM

## 2017-03-08 RX ORDER — NALOXONE HYDROCHLORIDE 0.4 MG/ML
0.1 INJECTION, SOLUTION INTRAMUSCULAR; INTRAVENOUS; SUBCUTANEOUS AS NEEDED
Status: DISCONTINUED | OUTPATIENT
Start: 2017-03-08 | End: 2017-03-08 | Stop reason: HOSPADM

## 2017-03-08 RX ORDER — LIDOCAINE HYDROCHLORIDE 10 MG/ML
0.1 INJECTION INFILTRATION; PERINEURAL AS NEEDED
Status: DISCONTINUED | OUTPATIENT
Start: 2017-03-08 | End: 2017-03-08 | Stop reason: HOSPADM

## 2017-03-08 RX ORDER — FENTANYL CITRATE 50 UG/ML
100 INJECTION, SOLUTION INTRAMUSCULAR; INTRAVENOUS ONCE
Status: COMPLETED | OUTPATIENT
Start: 2017-03-08 | End: 2017-03-08

## 2017-03-08 RX ORDER — PROPOFOL 10 MG/ML
INJECTION, EMULSION INTRAVENOUS AS NEEDED
Status: DISCONTINUED | OUTPATIENT
Start: 2017-03-08 | End: 2017-03-08 | Stop reason: HOSPADM

## 2017-03-08 RX ORDER — ROPIVACAINE HYDROCHLORIDE 5 MG/ML
INJECTION, SOLUTION EPIDURAL; INFILTRATION; PERINEURAL AS NEEDED
Status: DISCONTINUED | OUTPATIENT
Start: 2017-03-08 | End: 2017-03-08 | Stop reason: HOSPADM

## 2017-03-08 RX ORDER — HYDROMORPHONE HYDROCHLORIDE 2 MG/1
2-4 TABLET ORAL
Qty: 30 TAB | Refills: 0 | Status: SHIPPED | OUTPATIENT
Start: 2017-03-08 | End: 2019-08-21

## 2017-03-08 RX ORDER — ONDANSETRON 2 MG/ML
INJECTION INTRAMUSCULAR; INTRAVENOUS AS NEEDED
Status: DISCONTINUED | OUTPATIENT
Start: 2017-03-08 | End: 2017-03-08 | Stop reason: HOSPADM

## 2017-03-08 RX ORDER — DEXAMETHASONE SODIUM PHOSPHATE 4 MG/ML
INJECTION, SOLUTION INTRA-ARTICULAR; INTRALESIONAL; INTRAMUSCULAR; INTRAVENOUS; SOFT TISSUE AS NEEDED
Status: DISCONTINUED | OUTPATIENT
Start: 2017-03-08 | End: 2017-03-08 | Stop reason: HOSPADM

## 2017-03-08 RX ADMIN — CEFAZOLIN 2 G: 1 INJECTION, POWDER, FOR SOLUTION INTRAMUSCULAR; INTRAVENOUS; PARENTERAL at 09:42

## 2017-03-08 RX ADMIN — FENTANYL CITRATE 100 MCG: 50 INJECTION, SOLUTION INTRAMUSCULAR; INTRAVENOUS at 08:15

## 2017-03-08 RX ADMIN — PROPOFOL 40 MG: 10 INJECTION, EMULSION INTRAVENOUS at 09:48

## 2017-03-08 RX ADMIN — MIDAZOLAM HYDROCHLORIDE 2 MG: 1 INJECTION, SOLUTION INTRAMUSCULAR; INTRAVENOUS at 08:15

## 2017-03-08 RX ADMIN — METOPROLOL TARTRATE 50 MG: 50 TABLET ORAL at 08:10

## 2017-03-08 RX ADMIN — ROPIVACAINE HYDROCHLORIDE 30 ML: 5 INJECTION, SOLUTION EPIDURAL; INFILTRATION; PERINEURAL at 08:26

## 2017-03-08 RX ADMIN — FAMOTIDINE 20 MG: 20 TABLET ORAL at 07:33

## 2017-03-08 RX ADMIN — PROPOFOL 40 MG: 10 INJECTION, EMULSION INTRAVENOUS at 09:49

## 2017-03-08 RX ADMIN — DEXAMETHASONE SODIUM PHOSPHATE 4 MG: 4 INJECTION, SOLUTION INTRA-ARTICULAR; INTRALESIONAL; INTRAMUSCULAR; INTRAVENOUS; SOFT TISSUE at 10:17

## 2017-03-08 RX ADMIN — ONDANSETRON 4 MG: 2 INJECTION INTRAMUSCULAR; INTRAVENOUS at 10:17

## 2017-03-08 RX ADMIN — FENTANYL CITRATE 50 MCG: 50 INJECTION, SOLUTION INTRAMUSCULAR; INTRAVENOUS at 09:50

## 2017-03-08 RX ADMIN — PROPOFOL 160 MCG/KG/MIN: 10 INJECTION, EMULSION INTRAVENOUS at 09:48

## 2017-03-08 RX ADMIN — FENTANYL CITRATE 50 MCG: 50 INJECTION, SOLUTION INTRAMUSCULAR; INTRAVENOUS at 09:45

## 2017-03-08 RX ADMIN — SODIUM CHLORIDE, SODIUM LACTATE, POTASSIUM CHLORIDE, AND CALCIUM CHLORIDE 100 ML/HR: 600; 310; 30; 20 INJECTION, SOLUTION INTRAVENOUS at 07:33

## 2017-03-08 RX ADMIN — SODIUM CHLORIDE, SODIUM LACTATE, POTASSIUM CHLORIDE, AND CALCIUM CHLORIDE: 600; 310; 30; 20 INJECTION, SOLUTION INTRAVENOUS at 11:03

## 2017-03-08 NOTE — ANESTHESIA POSTPROCEDURE EVALUATION
Post-Anesthesia Evaluation and Assessment    Patient: Rocio Francisco MRN: [id number]  SSN: [id number]    YOB: 1980  Age: 29 y.o. Sex: male       Cardiovascular Function/Vital Signs  Visit Vitals    /68 (BP 1 Location: Left arm, BP Patient Position: At rest;Supine)    Pulse 78    Temp 36.5 °C (97.7 °F)    Resp 16    Wt 89.8 kg (198 lb)    SpO2 95%    BMI 24.1 kg/m2       Patient is status post No value filed. anesthesia for Procedure(s):  RIGHT RADIUS OPEN REDUCTION INTERNAL FIXATION. Nausea/Vomiting: None    Postoperative hydration reviewed and adequate. Pain:  Pain Scale 1: Numeric (0 - 10) (03/08/17 1147)  Pain Intensity 1: 0 (03/08/17 1147)   Managed    Neurological Status:   Neuro (WDL): Within Defined Limits (03/08/17 1147)  Neuro  Neurologic State: Alert (03/08/17 1147)   At baseline    Mental Status and Level of Consciousness: Arousable    Pulmonary Status:   O2 Device: Room air (03/08/17 1147)   Adequate oxygenation and airway patent    Complications related to anesthesia: None    Post-anesthesia assessment completed.  No concerns    Signed By: Tiki Suggs MD     March 8, 2017

## 2017-03-08 NOTE — IP AVS SNAPSHOT
Luiz Cornejo 
 
 
 [address] 
[phone] Patient: Rocio Francisco MRN: [id number] :1980 You are allergic to the following Allergen Reactions Aspirin Other (comments) Pt denies this allergy 3/8/17 Recent Documentation Weight BMI Smoking Status 89.8 kg 24.1 kg/m2 Current Every Day Smoker Emergency Contacts Name Discharge Info Relation Home Work Mobile Stephanie Felton  Spouse [3] [phone] José Luis Felton  Father [15] [phone] About your hospitalization You were admitted on:  2017 [facility] You were discharged on:  2017 Unit phone number:  [phone] Why you were hospitalized Your primary diagnosis was:  Not on File Providers Seen During Your Hospitalizations Provider Role Specialty Primary office phone Evy Ann MD Attending Provider Orthopedic Surgery [phone] Your Primary Care Physician (PCP) Primary Care Physician Office Phone Office Fax [facility] [phone] Follow-up Information Follow up With Details Comments Contact Info Krystal Jackson MD   [facility] [address] [address] [phone] Current Discharge Medication List  
  
START taking these medications Dose & Instructions Dispensing Information Comments Morning Noon Evening Bedtime HYDROmorphone 2 mg tablet Commonly known as:  DILAUDID Your next dose is: Today, Tomorrow Other:  _________ Dose:  2-4 mg Take 1-2 Tabs by mouth every three (3) hours as needed for Pain. Max Daily Amount: 32 mg. Quantity:  30 Tab Refills:  0 CONTINUE these medications which have CHANGED Dose & Instructions Dispensing Information Comments Morning Noon Evening Bedtime  
 atorvastatin 80 mg tablet Commonly known as:  LIPITOR What changed:  when to take this Your next dose is: Today, Tomorrow Other:  _________ Dose:  80 mg Take 1 Tab by mouth daily. Quantity:  90 Tab Refills:  1  
     
   
   
   
  
 citalopram 20 mg tablet Commonly known as:  Beny Herr What changed:   
- how much to take 
- how to take this - when to take this 
- additional instructions Your next dose is: Today, Tomorrow Other:  _________ TAKE 1 TABLET BY MOUTH EVERY DAY Quantity:  90 Tab Refills:  1  
     
   
   
   
  
 metoprolol succinate 50 mg XL tablet Commonly known as:  TOPROL-XL What changed:  when to take this Your next dose is: Today, Tomorrow Other:  _________ Dose:  50 mg Take 1 Tab by mouth daily. Quantity:  90 Tab Refills:  1  
     
   
   
   
  
 nitroglycerin 0.4 mg SL tablet Commonly known as:  NITROSTAT What changed:  reasons to take this Your next dose is: Today, Tomorrow Other:  _________ Dose:  0.4 mg  
1 Tab by SubLINGual route every five (5) minutes as needed for Chest Pain. Quantity:  1 Bottle Refills:  3 CONTINUE these medications which have NOT CHANGED Dose & Instructions Dispensing Information Comments Morning Noon Evening Bedtime  
 aspirin 325 mg tablet Commonly known as:  ASPIRIN Your next dose is: Today, Tomorrow Other:  _________ Dose:  325 mg Take 325 mg by mouth daily. Hx of mi--- states last dose of Aspirin 325 mg ~3/1/17-- Pt to takes Aspirin 81 mg until surgery completed then revert to Aspirin 325 mg. Refills:  0  
     
   
   
   
  
 lisinopril 2.5 mg tablet Commonly known as:  Loredo Regulo Your next dose is: Today, Tomorrow Other:  _________ Dose:  2.5 mg Take 1 Tab by mouth daily. Quantity:  90 Tab Refills:  1  
     
   
   
   
  
 multivitamin tablet Commonly known as:  ONE A DAY Your next dose is: Today, Tomorrow Other:  _________ Dose:  1 Tab Take 1 Tab by mouth daily. Hold for surgery per anesthesia protocol 3/7/17 Refills:  0 STOP taking these medications   
 oxyCODONE IR 5 mg immediate release tablet Commonly known as:  Jennifer Valdez Where to Get Your Medications Information on where to get these meds will be given to you by the nurse or doctor. ! Ask your nurse or doctor about these medications HYDROmorphone 2 mg tablet Discharge Instructions POST OP INSTRUCTIONS 1. Patient has appointment for 3/17/17 at 11:15 AM at the [address] [place] [address]. 2.  For problems call Dr Judge Diaz, [facility],  [phone] Appointments only,  [phone] 
 
3. Ice and elevate the surgical site. 4.  Keep splints and dressing dry and intact. 5.  If able to tolerate, take   Acetaminophen 325 mg  2 every 4 hrs   And Ibuprofen 200 mg   3 every 6 hrs   OR   Aleve 220 mg 2 every 8 hrs to help with pain ( Do not take Ibuprofen or Aleve if taking any other anti inflamatory drug, NSAID, or anti arthritis drug or if taking blood thinners.) DIET · Clear liquids until no nausea or vomiting; then light diet for the first day. · Advance to regular diet on second day, unless your doctor orders otherwise. · If nausea and vomiting continues, call your doctor. PAIN 
· Take pain medication as directed by your doctor. · Call your doctor if pain is NOT relieved by medication. CALL YOUR DOCTOR IF  
· Excessive bleeding that does not stop after holding pressure over the area · Temperature of 101 degrees F or above · Excessive redness, swelling or bruising, and/ or green or yellow, smelly discharge from incision AFTER ANESTHESIA · For the first 24 hours: DO NOT Drive, Drink alcoholic beverages, or Make important decisions. · Be aware of dizziness following anesthesia and while taking pain medication. APPOINTMENT DATE/ TIME see above YOUR DOCTOR'S PHONE NUMBER [phone] DISCHARGE SUMMARY from Nurse PATIENT INSTRUCTIONS: 
 
After general anesthesia or intravenous sedation, for 24 hours or while taking prescription Narcotics: · Limit your activities · Do not drive and operate hazardous machinery · Do not make important personal or business decisions · Do  not drink alcoholic beverages · If you have not urinated within 8 hours after discharge, please contact your surgeon on call. *  Please give a list of your current medications to your Primary Care Provider. *  Please update this list whenever your medications are discontinued, doses are 
    changed, or new medications (including over-the-counter products) are added. *  Please carry medication information at all times in case of emergency situations. These are general instructions for a healthy lifestyle: No smoking/ No tobacco products/ Avoid exposure to second hand smoke Surgeon General's Warning:  Quitting smoking now greatly reduces serious risk to your health. Obesity, smoking, and sedentary lifestyle greatly increases your risk for illness A healthy diet, regular physical exercise & weight monitoring are important for maintaining a healthy lifestyle You may be retaining fluid if you have a history of heart failure or if you experience any of the following symptoms:  Weight gain of 3 pounds or more overnight or 5 pounds in a week, increased swelling in our hands or feet or shortness of breath while lying flat in bed.   Please call your doctor as soon as you notice any of these symptoms; do not wait until your next office visit. Recognize signs and symptoms of STROKE: 
 
F-face looks uneven A-arms unable to move or move unevenly S-speech slurred or non-existent T-time-call 911 as soon as signs and symptoms begin-DO NOT go Back to bed or wait to see if you get better-TIME IS BRAIN. Vitamin C   500 mg  Once a day for 2 months. Discharge Orders None Introducing [facility]! Marion Richardson introduces [employer] patient portal. Now you can access parts of your medical record, email your doctor's office, and request medication refills online. 1. In your internet browser, go to [email or web address]/"[email or web address] 2. Click on the First Time User? Click Here link in the Sign In box. You will see the New Member Sign Up page. 3. Enter your [employer] Access Code exactly as it appears below. You will not need to use this code after youve completed the sign-up process. If you do not sign up before the expiration date, you must request a new code. · [employer] Access Code: [email or web address] Expires: 6/2/2017  5:38 PM 
 
4. Enter the last four digits of your Social Security Number (xxxx) and Date of Birth (mm/dd/yyyy) as indicated and click Submit. You will be taken to the next sign-up page. 5. Create a [employer] ID. This will be your [employer] login ID and cannot be changed, so think of one that is secure and easy to remember. 6. Create a [employer] password. You can change your password at any time. 7. Enter your Password Reset Question and Answer. This can be used at a later time if you forget your password. 8. Enter your e-mail address. You will receive e-mail notification when new information is available in [address] 9. Click Sign Up. You can now view and download portions of your medical record. 10. Click the Download Summary menu link to download a portable copy of your medical information. If you have questions, please visit the Frequently Asked Questions section of the MyChart website. Remember, MyChart is NOT to be used for urgent needs. For medical emergencies, dial 911. Now available from your iPhone and Android! General Information Please provide this summary of care documentation to your next provider. Patient Signature:  ____________________________________________________________ Date:  ____________________________________________________________  
  
[facility] [place] Provider Signature:  ____________________________________________________________ Date:  ____________________________________________________________

## 2017-03-08 NOTE — ANESTHESIA PREPROCEDURE EVALUATION
Anesthetic History               Review of Systems / Medical History  Patient summary reviewed    Pulmonary          Smoker         Neuro/Psych              Cardiovascular    Hypertension          Past MI (2014) and CAD    Exercise tolerance: <4 METS     GI/Hepatic/Renal                Endo/Other             Other Findings              Physical Exam    Airway  Mallampati: II  TM Distance: > 6 cm  Neck ROM: normal range of motion   Mouth opening: Normal     Cardiovascular  Regular rate and rhythm,  S1 and S2 normal,  no murmur, click, rub, or gallop             Dental  No notable dental hx       Pulmonary  Breath sounds clear to auscultation               Abdominal         Other Findings            Anesthetic Plan    ASA: 3        Post-op pain plan if not by surgeon: peripheral nerve block single      Anesthetic plan and risks discussed with: Patient

## 2017-03-08 NOTE — ANESTHESIA PROCEDURE NOTES
Peripheral Block    Start time: 3/8/2017 8:17 AM  End time: 3/8/2017 8:26 AM  Performed by: Dominga Montoya  Authorized by: Dominga Montoya       Pre-procedure:    Indications: at surgeon's request and post-op pain management    Preanesthetic Checklist: patient identified, risks and benefits discussed, site marked, timeout performed, anesthesia consent given and patient being monitored    Timeout Time: 08:17          Block Type:   Block Type:  Axillary  Laterality:  Right  Monitoring:  Continuous pulse ox, frequent vital sign checks, heart rate, oxygen and responsive to questions  Injection Technique:  Single shot  Procedures: ultrasound guided and nerve stimulator    Patient Position: supine  Prep: DuraPrep    Location:  Axilla  Needle Type:  Stimuplex  Needle Gauge:  20 G  Needle Localization:  Nerve stimulator and ultrasound guidance  Motor Response: minimal motor response >0.4 mA    Medication Injected:  0.5%  ropivacaine  Adds:  Epi 1:400K  Volume (mL):  30  Add'l Medication Injected:  1.0%  mepivacaine  Adds:  Epi 1:400K  Volume (mL):  10    Assessment:  Number of attempts:  1  Injection Assessment:  Incremental injection every 5 mL, local visualized surrounding nerve on ultrasound, negative aspiration for blood, no intravascular symptoms, no paresthesia and ultrasound image on chart  Patient tolerance:  Patient tolerated the procedure well with no immediate complications

## 2017-03-08 NOTE — OP NOTES
ORIF OF 3 PART INTRAARTICULAR DISTAL RADIUS FX     Aly Kim is a 29 y.o. male    3/8/2017    PREOP DIAGNOSIS: COMMINUTED INTRA-ARTICULAR FX OF THE RIGHT DISTAL RADIUS    POSTOP DIAGNOSIS:  COMMINUTED INTRA-ARTICULAR FX OF THE RIGHT DISTAL RADIUS    PROCEDURE:  OPEN REDUCTION INTERNAL FIXATION OF 3 - PART INTRA-ARTICULAR FX OF THE RIGHT DISTAL RADIUS    SURGEON:  Evans Lai MD    ANESTHESIA:  Regional    INDICATIONS:  Aly Kim is a 29 y.o. male with a comminuted intra-articular fx of the right distal radius. The planned procedure and alternatives for treatment have been discussed previously. Informed consent has been obtained. The operative site has been marked and perioperative antibiotics given. PROCEDURAL NOTE:   The patient was brought to the OR and anesthetized for the procedure. The patient was positioned in the supine position and bony prominences and nerves padded or protected. The right upper extremity was prepped and draped as a sterile field. A time out was held with the operative team and the patient, procedure, surgical site and surgeon identified. The extremity was exsanguinated with a sterile Esmarch bandage and a tourniquet around the upper arm inflated to 250 mm HG. The fracture was manipulated and checked with the image intensifier. An incision was made over the FCR tendon trying to preserve the patients tattoo. Small bleeders were electrocoagulated as needed. The deep part of the tendon sheath was opened. The pronator quadratus and the distal part of the FPL were released along the radial aspect of the radius and retracted ulnarly protecting the carpal tunnel. The fracture fragments were identified and further reduced with small elevators and dental probes. An appropriately sized TriMed Variable angle volar plate was positioned over the fracture using the fluoroscope. It was temporarily fixed with K-wires.   The proximal part of the plate was attached with cortical screws. The distal fragments were attached and stabilized with locking screws and smooth pegs. During the insertion of the screws the image intensifier was used to check position and length. Adjustments were made as needed. Screws were able to be placed to secure all three parts of the fracture. When I was satisfied with the fixation a final check was made with the image in multiple planes. The wound was irrigated. The pronator was reattached with 3-0 PDS sutures. The superficial part of the FCR sheath was repaired with 3-0 PDS as well. The skin was closed with 4-0 nylon. The DRUJ seemed stable to rotation and manipulation. Sterile dressings of gauze and Webril were used. A volar splint was applied and held in place with an ACE wrap. The tourniquet was deflated with good return of capillary refill to the fingers. The patient was taken to the recovery room by anesthesia. TOURNIQUET TIME:   Total Tourniquet Time Documented:  Upper Arm (Right) - 53 minutes  Total: Upper Arm (Right) - 53 minutes      IMPLANTS:   Implant Name Type Inv.  Item Serial No.  Lot No. LRB No. Used Action   PLATE VOLAR 4 DIS PEG 3HLE LF --  - [id number]  PLATE VOLAR 4 DIS PEG 3HLE LF --   [employer] [id number] Right 1 Implanted   SCR BNE JAYDE HEX 3.2X14MM --  - [id number]  SCR BNE JAYDE HEX 3.2X14MM --   [employer] [id number] Right 1 Implanted   SCR JAYDE LCK 3.2X16MM --  - [id number]  SCR JAYDE LCK 3.2X16MM --   [employer] [id number] Right 1 Implanted   CORTICAL LOCKING SCREW    [employer] [id number] Right 1 Implanted   PEG WRST THRD VOLAR 16MM --  - [id number]  PEG WRST THRD VOLAR 16MM --   [employer] [id number] Right 1 Implanted   PEG WRST THRD VOLAR 18MM --  - [id number]  PEG WRST THRD VOLAR 18MM --   [employer] [id number] Right 1 Implanted   PEG WRST THRD VOLAR 20MM --  - [id number]  PEG WRST THRD VOLAR 20MM --   [employer] [id number] Right 1 Implanted   PEG       TR[employer]ED [id number] Right 1 Implanted       SIGNED: Navneet Shaikh Shawn Rob MD

## 2017-03-08 NOTE — DISCHARGE INSTRUCTIONS
POST OP INSTRUCTIONS      1. Patient has appointment for 3/17/17 at 11:15 AM at the [address] [place] [address]. 2.  For problems call Dr Morena French, [facility],  [phone]          Appointments only,  [phone]    3. Ice and elevate the surgical site. 4.  Keep splints and dressing dry and intact. 5.  If able to tolerate, take   Acetaminophen 325 mg  2 every 4 hrs   And                                               Ibuprofen 200 mg   3 every 6 hrs   OR   Aleve 220 mg 2 every 8 hrs to help with pain                                                   ( Do not take Ibuprofen or Aleve if taking any other anti inflamatory drug, NSAID, or anti arthritis drug or if taking blood thinners.)      DIET  · Clear liquids until no nausea or vomiting; then light diet for the first day. · Advance to regular diet on second day, unless your doctor orders otherwise. · If nausea and vomiting continues, call your doctor. PAIN  · Take pain medication as directed by your doctor. · Call your doctor if pain is NOT relieved by medication. CALL YOUR DOCTOR IF   · Excessive bleeding that does not stop after holding pressure over the area  · Temperature of 101 degrees F or above  · Excessive redness, swelling or bruising, and/ or green or yellow, smelly discharge from incision    AFTER ANESTHESIA   · For the first 24 hours: DO NOT Drive, Drink alcoholic beverages, or Make important decisions. · Be aware of dizziness following anesthesia and while taking pain medication.      APPOINTMENT DATE/ TIME see above    YOUR DOCTOR'S PHONE NUMBER [phone]      DISCHARGE SUMMARY from Nurse    PATIENT INSTRUCTIONS:    After general anesthesia or intravenous sedation, for 24 hours or while taking prescription Narcotics:  · Limit your activities  · Do not drive and operate hazardous machinery  · Do not make important personal or business decisions  · Do  not drink alcoholic beverages  · If you have not urinated within 8 hours after discharge, please contact your surgeon on call. *  Please give a list of your current medications to your Primary Care Provider. *  Please update this list whenever your medications are discontinued, doses are      changed, or new medications (including over-the-counter products) are added. *  Please carry medication information at all times in case of emergency situations. These are general instructions for a healthy lifestyle:    No smoking/ No tobacco products/ Avoid exposure to second hand smoke    Surgeon General's Warning:  Quitting smoking now greatly reduces serious risk to your health. Obesity, smoking, and sedentary lifestyle greatly increases your risk for illness    A healthy diet, regular physical exercise & weight monitoring are important for maintaining a healthy lifestyle    You may be retaining fluid if you have a history of heart failure or if you experience any of the following symptoms:  Weight gain of 3 pounds or more overnight or 5 pounds in a week, increased swelling in our hands or feet or shortness of breath while lying flat in bed. Please call your doctor as soon as you notice any of these symptoms; do not wait until your next office visit. Recognize signs and symptoms of STROKE:    F-face looks uneven    A-arms unable to move or move unevenly    S-speech slurred or non-existent    T-time-call 911 as soon as signs and symptoms begin-DO NOT go       Back to bed or wait to see if you get better-TIME IS BRAIN. Vitamin C   500 mg  Once a day for 2 months.

## 2017-08-08 PROBLEM — I10 BENIGN ESSENTIAL HTN: Status: ACTIVE | Noted: 2017-08-08

## 2018-01-16 ENCOUNTER — APPOINTMENT (OUTPATIENT)
Dept: GENERAL RADIOLOGY | Age: 38
End: 2018-01-16
Attending: EMERGENCY MEDICINE
Payer: COMMERCIAL

## 2018-01-16 ENCOUNTER — HOSPITAL ENCOUNTER (EMERGENCY)
Age: 38
Discharge: HOME | End: 2018-01-16
Attending: EMERGENCY MEDICINE
Payer: COMMERCIAL

## 2018-01-16 VITALS
TEMPERATURE: 97.5 F | OXYGEN SATURATION: 98 % | SYSTOLIC BLOOD PRESSURE: 127 MMHG | BODY MASS INDEX: 27.1 KG/M2 | HEIGHT: 75 IN | DIASTOLIC BLOOD PRESSURE: 88 MMHG | RESPIRATION RATE: 16 BRPM | HEART RATE: 85 BPM | WEIGHT: 218 LBS

## 2018-01-16 DIAGNOSIS — R07.89 ATYPICAL CHEST PAIN: Primary | ICD-10-CM

## 2018-01-16 LAB
ALBUMIN SERPL-MCNC: 4.3 G/DL (ref 3.5–5)
ALBUMIN/GLOB SERPL: 1.2 {RATIO} (ref 1.2–3.5)
ALP SERPL-CCNC: 63 U/L (ref 50–136)
ALT SERPL-CCNC: 24 U/L (ref 12–65)
ANION GAP SERPL CALC-SCNC: 9 MMOL/L (ref 7–16)
AST SERPL-CCNC: 14 U/L (ref 15–37)
ATRIAL RATE: 75 BPM
BASOPHILS # BLD: 0.1 K/UL (ref 0–0.2)
BASOPHILS NFR BLD: 1 % (ref 0–2)
BILIRUB SERPL-MCNC: 1.6 MG/DL (ref 0.2–1.1)
BUN SERPL-MCNC: 11 MG/DL (ref 6–23)
CALCIUM SERPL-MCNC: 9.2 MG/DL (ref 8.3–10.4)
CALCULATED P AXIS, ECG09: 48 DEGREES
CALCULATED R AXIS, ECG10: 82 DEGREES
CALCULATED T AXIS, ECG11: 63 DEGREES
CHLORIDE SERPL-SCNC: 102 MMOL/L (ref 98–107)
CO2 SERPL-SCNC: 26 MMOL/L (ref 21–32)
CREAT SERPL-MCNC: 0.79 MG/DL (ref 0.8–1.5)
D DIMER PPP FEU-MCNC: <0.27 UG/ML(FEU)
DIAGNOSIS, 93000: NORMAL
DIFFERENTIAL METHOD BLD: ABNORMAL
EOSINOPHIL # BLD: 0.2 K/UL (ref 0–0.8)
EOSINOPHIL NFR BLD: 2 % (ref 0.5–7.8)
ERYTHROCYTE [DISTWIDTH] IN BLOOD BY AUTOMATED COUNT: 13.3 % (ref 11.9–14.6)
GLOBULIN SER CALC-MCNC: 3.6 G/DL (ref 2.3–3.5)
GLUCOSE SERPL-MCNC: 102 MG/DL (ref 65–100)
HCT VFR BLD AUTO: 47.7 % (ref 41.1–50.3)
HGB BLD-MCNC: 17.4 G/DL (ref 13.6–17.2)
IMM GRANULOCYTES # BLD: 0 K/UL (ref 0–0.5)
IMM GRANULOCYTES NFR BLD AUTO: 0 % (ref 0–5)
LYMPHOCYTES # BLD: 3.3 K/UL (ref 0.5–4.6)
LYMPHOCYTES NFR BLD: 33 % (ref 13–44)
MCH RBC QN AUTO: 33.3 PG (ref 26.1–32.9)
MCHC RBC AUTO-ENTMCNC: 36.5 G/DL (ref 31.4–35)
MCV RBC AUTO: 91.2 FL (ref 79.6–97.8)
MONOCYTES # BLD: 0.9 K/UL (ref 0.1–1.3)
MONOCYTES NFR BLD: 9 % (ref 4–12)
NEUTS SEG # BLD: 5.4 K/UL (ref 1.7–8.2)
NEUTS SEG NFR BLD: 55 % (ref 43–78)
P-R INTERVAL, ECG05: 154 MS
PLATELET # BLD AUTO: 594 K/UL (ref 150–450)
PMV BLD AUTO: 8.4 FL (ref 10.8–14.1)
POTASSIUM SERPL-SCNC: 4.3 MMOL/L (ref 3.5–5.1)
PROT SERPL-MCNC: 7.9 G/DL (ref 6.3–8.2)
Q-T INTERVAL, ECG07: 384 MS
QRS DURATION, ECG06: 94 MS
QTC CALCULATION (BEZET), ECG08: 428 MS
RBC # BLD AUTO: 5.23 M/UL (ref 4.23–5.67)
SODIUM SERPL-SCNC: 137 MMOL/L (ref 136–145)
TROPONIN I BLD-MCNC: 0.01 NG/ML (ref 0.02–0.05)
TROPONIN I BLD-MCNC: 0.04 NG/ML (ref 0.02–0.05)
VENTRICULAR RATE, ECG03: 75 BPM
WBC # BLD AUTO: 10.1 K/UL (ref 4.3–11.1)

## 2018-01-16 PROCEDURE — 84484 ASSAY OF TROPONIN QUANT: CPT

## 2018-01-16 PROCEDURE — 85025 COMPLETE CBC W/AUTO DIFF WBC: CPT | Performed by: EMERGENCY MEDICINE

## 2018-01-16 PROCEDURE — 71045 X-RAY EXAM CHEST 1 VIEW: CPT

## 2018-01-16 PROCEDURE — 99285 EMERGENCY DEPT VISIT HI MDM: CPT | Performed by: EMERGENCY MEDICINE

## 2018-01-16 PROCEDURE — 93005 ELECTROCARDIOGRAM TRACING: CPT | Performed by: EMERGENCY MEDICINE

## 2018-01-16 PROCEDURE — 80053 COMPREHEN METABOLIC PANEL: CPT | Performed by: EMERGENCY MEDICINE

## 2018-01-16 PROCEDURE — 85379 FIBRIN DEGRADATION QUANT: CPT | Performed by: EMERGENCY MEDICINE

## 2018-01-16 NOTE — ED PROVIDER NOTES
HPI Comments: Patient is a 79-year-old male with smoking history (reports quit smoking) and history of an STEMI  With catheterization showing distal RCA lesion that was managed medically 4 years ago. Patient states last night he developed some chest pain as well as shortness of breath. Denies any wheezing, cough, fevers, nausea, vomiting or leg swelling. Patient is a 40 y.o. male presenting with chest pain. The history is provided by the patient. No  was used. Chest Pain (Angina)    Associated symptoms include shortness of breath. Pertinent negatives include no abdominal pain, no back pain, no cough, no fever, no headaches, no nausea and no vomiting.         Past Medical History:   Diagnosis Date    Anxiety     GERD (gastroesophageal reflux disease)     pt treats with tums- resolves per pt- sleeps 1 pillow    Hypertension     managed with meds    NSTEMI (non-ST elevated myocardial infarction) ([facility] [address] 8/14/2014    no stents- no angioplasty-- treatment with medication only    Other ill-defined conditions(799.89)     spirocytosis, high cholesterol    PUD (peptic ulcer disease) 2014    resolved per pt/ treated    Spherocytosis ([facility] [address]     URI (upper respiratory infection) 8/14/2014       Past Surgical History:   Procedure Laterality Date    HX APPENDECTOMY      HX CHOLECYSTECTOMY  1987    HX GI      spleen removed    HX HEART CATHETERIZATION  2014    no intervention    HX SPLENECTOMY  1987    and cholecystectomy     HX TONSILLECTOMY      HX WRIST FRACTURE TX Right 03/2017    titanium donny/screws         Family History:   Problem Relation Age of Onset    Heart Disease Mother     Hypertension Mother     Heart Disease Father     Hypertension Father     Stroke Maternal Grandfather     Heart Disease Paternal Grandfather     Diabetes Paternal Grandfather        Social History     Social History    Marital status:      Spouse name: N/A    Number of children: N/A    Years of education: N/A     Occupational History    Not on file. Social History Main Topics    Smoking status: Current Every Day Smoker     Packs/day: 1.00     Years: 23.00    Smokeless tobacco: Current User     Types: Chew      Comment: uses chewing tobacco occasionally    Alcohol use 0.0 oz/week      Comment: 1-2 / week    Drug use: Yes     Special: Prescription    Sexual activity: Yes     Partners: Female     Other Topics Concern    Not on file     Social History Narrative         ALLERGIES: Review of patient's allergies indicates no known allergies. Review of Systems   Constitutional: Negative for fatigue and fever. HENT: Negative for sore throat. Eyes: Negative for pain. Respiratory: Positive for shortness of breath. Negative for cough and chest tightness. Cardiovascular: Positive for chest pain. Negative for leg swelling. Gastrointestinal: Negative for abdominal pain, nausea and vomiting. Genitourinary: Negative for dysuria. Musculoskeletal: Negative for back pain. Neurological: Negative for syncope and headaches. Vitals:    01/16/18 1007   BP: (!) 133/94   Pulse: 85   Resp: 16   Temp: 97.5 °F (36.4 °C)   SpO2: 98%   Weight: 98.9 kg (218 lb)   Height: 6' 3\" (1.905 m)            Physical Exam   Constitutional: He is oriented to person, place, and time. He appears well-developed and well-nourished. No distress. HENT:   Head: Normocephalic and atraumatic. Eyes: Conjunctivae and EOM are normal. Pupils are equal, round, and reactive to light. Neck: Normal range of motion. Neck supple. Cardiovascular: Normal rate, regular rhythm and normal heart sounds. Pulmonary/Chest: Effort normal and breath sounds normal. No respiratory distress. He has no wheezes. He has no rales. Abdominal: Soft. He exhibits no distension. There is no tenderness. There is no rebound. Musculoskeletal: Normal range of motion. He exhibits no edema or tenderness.    Neurological: He is alert and oriented to person, place, and time. Skin: Skin is warm and dry. No rash noted. He is not diaphoretic. Psychiatric: He has a normal mood and affect. His behavior is normal.   Vitals reviewed. MDM  Number of Diagnoses or Management Options  Atypical chest pain: new and does not require workup  Diagnosis management comments: Patient's labs are unremarkable. D-dimer negative. He states he feels improved and he did this morning. EKG shows no significant abnormalities. We'll discharge home and have him follow-up with cardiology. I called the after hours cardiology referral line. Return precautions discussed. Advised patient to follow up with PCP as soon as possible.        Amount and/or Complexity of Data Reviewed  Clinical lab tests: ordered and reviewed (Results for orders placed or performed during the hospital encounter of 01/16/18  -CBC WITH AUTOMATED DIFF       Result                                            Value                         Ref Range                       WBC                                               10.1                          4.3 - 11.1 K/uL                 RBC                                               5.23                          4.23 - 5.67 M/uL                HGB                                               17.4 (H)                      13.6 - 17.2 g/dL                HCT                                               47.7                          41.1 - 50.3 %                   MCV                                               91.2                          79.6 - 97.8 FL                  MCH                                               33.3 (H)                      26.1 - 32.9 PG                  MCHC                                              36.5 (H)                      31.4 - 35.0 g/dL                RDW                                               13.3                          11.9 - 14.6 %                   PLATELET                                          594 (H) 150 - 450 K/uL                  MPV                                               8.4 (L)                       10.8 - 14.1 FL                  DF                                                AUTOMATED                                                     NEUTROPHILS                                       55                            43 - 78 %                       LYMPHOCYTES                                       33                            13 - 44 %                       MONOCYTES                                         9                             4.0 - 12.0 %                    EOSINOPHILS                                       2                             0.5 - 7.8 %                     BASOPHILS                                         1                             0.0 - 2.0 %                     IMMATURE GRANULOCYTES                             0                             0.0 - 5.0 %                     ABS. NEUTROPHILS                                  5.4                           1.7 - 8.2 K/UL                  ABS. LYMPHOCYTES                                  3.3                           0.5 - 4.6 K/UL                  ABS. MONOCYTES                                    0.9                           0.1 - 1.3 K/UL                  ABS. EOSINOPHILS                                  0.2                           0.0 - 0.8 K/UL                  ABS. BASOPHILS                                    0.1                           0.0 - 0.2 K/UL                  ABS. IMM.  GRANS.                                  0.0                           0.0 - 0.5 K/UL             -METABOLIC PANEL, COMPREHENSIVE       Result                                            Value                         Ref Range                       Sodium                                            137                           136 - 145 mmol/L                Potassium                                         4.3 3.5 - 5.1 mmol/L                Chloride                                          102                           98 - 107 mmol/L                 CO2                                               26                            21 - 32 mmol/L                  Anion gap                                         9                             7 - 16 mmol/L                   Glucose                                           102 (H)                       65 - 100 mg/dL                  BUN                                               11                            6 - 23 MG/DL                    Creatinine                                        0.79 (L)                      0.8 - 1.5 MG/DL                 GFR est AA                                        >60                           >60 ml/min/1.73m2               GFR est non-AA                                    >60                           >60 ml/min/1.73m2               Calcium                                           9.2                           8.3 - 10.4 MG/DL                Bilirubin, total                                  1.6 (H)                       0.2 - 1.1 MG/DL                 ALT (SGPT)                                        24                            12 - 65 U/L                     AST (SGOT)                                        14 (L)                        15 - 37 U/L                     Alk. phosphatase                                  63                            50 - 136 U/L                    Protein, total                                    7.9                           6.3 - 8.2 g/dL                  Albumin                                           4.3                           3.5 - 5.0 g/dL                  Globulin                                          3.6 (H)                       2.3 - 3.5 g/dL                  A-G Ratio                                         1.2                           1.2 - 3.5                  -D DIMER Result                                            Value                         Ref Range                       D DIMER                                           <0.27                         <0.56 ug/ml(FEU)           -POC TROPONIN-I       Result                                            Value                         Ref Range                       Troponin-I (POC)                                  0.01 (L)                      0.02 - 0.05 ng/ml          -POC TROPONIN-I       Result                                            Value                         Ref Range                       Troponin-I (POC)                                  0.04                          0.02 - 0.05 ng/ml          -EKG, 12 LEAD, INITIAL       Result                                            Value                         Ref Range                       Ventricular Rate                                  75                            BPM                             Atrial Rate                                       75                            BPM                             P-R Interval                                      154                           ms                              QRS Duration                                      94                            ms                              Q-T Interval                                      384                           ms                              QTC Calculation (Bezet)                           428                           ms                              Calculated P Axis                                 48                            degrees                         Calculated R Axis                                 82                            degrees                         Calculated T Axis                                 63                            degrees                         Diagnosis Normal sinus rhythm   Normal ECG   When compared with ECG of 11-JAN-2016 20:10,   No significant change was found   Confirmed by ST NATALEE MONTERROSO MD (), JENAE STEWART ([id number]) on 1/16/2018 3:09:22 PM     )  Tests in the radiology section of CPT®: ordered and reviewed (Xr Chest Port    Result Date: 1/16/2018  PA chest x-ray 1/16/2018 INDICATION: Chest pain COMPARISON: 1/11/2016 FINDINGS: Lung fields, cardiac silhouette, soft tissues, and bony structures are unremarkable.      IMPRESSION: Negative    )  Review and summarize past medical records: yes  Independent visualization of images, tracings, or specimens: yes (EKG demonstrates normal sinus rhythm with no ST or T-wave abnormalities.)    Risk of Complications, Morbidity, and/or Mortality  Presenting problems: high  Diagnostic procedures: moderate  Management options: moderate    Patient Progress  Patient progress: improved    ED Course       Procedures

## 2018-01-16 NOTE — ED NOTES
I have reviewed discharge instructions with the patient. The patient verbalized understanding. Patient left ED via Discharge Method: ambulatory to Home with self    Opportunity for questions and clarification provided. Patient given 0 scripts. To continue your aftercare when you leave the hospital, you may receive an automated call from our care team to check in on how you are doing. This is a free service and part of our promise to provide the best care and service to meet your aftercare needs.  If you have questions, or wish to unsubscribe from this service please call [phone]. Thank you for Choosing our [facility].

## 2018-01-16 NOTE — DISCHARGE INSTRUCTIONS
Chest Pain: Care Instructions  Your Care Instructions    There are many things that can cause chest pain. Some are not serious and will get better on their own in a few days. But some kinds of chest pain need more testing and treatment. Your doctor may have recommended a follow-up visit in the next 8 to 12 hours. If you are not getting better, you may need more tests or treatment. Even though your doctor has released you, you still need to watch for any problems. The doctor carefully checked you, but sometimes problems can develop later. If you have new symptoms or if your symptoms do not get better, get medical care right away. If you have worse or different chest pain or pressure that lasts more than 5 minutes or you passed out (lost consciousness), call 911 or seek other emergency help right away. A medical visit is only one step in your treatment. Even if you feel better, you still need to do what your doctor recommends, such as going to all suggested follow-up appointments and taking medicines exactly as directed. This will help you recover and help prevent future problems. How can you care for yourself at home? · Rest until you feel better. · Take your medicine exactly as prescribed. Call your doctor if you think you are having a problem with your medicine. · Do not drive after taking a prescription pain medicine. When should you call for help? Call 911 if:  ? · You passed out (lost consciousness). ? · You have severe difficulty breathing. ? · You have symptoms of a heart attack. These may include:  ¨ Chest pain or pressure, or a strange feeling in your chest.  ¨ Sweating. ¨ Shortness of breath. ¨ Nausea or vomiting. ¨ Pain, pressure, or a strange feeling in your back, neck, jaw, or upper belly or in one or both shoulders or arms. ¨ Lightheadedness or sudden weakness. ¨ A fast or irregular heartbeat.   After you call 911, the  may tell you to chew 1 adult-strength or 2 to 4 low-dose aspirin. Wait for an ambulance. Do not try to drive yourself. ?Call your doctor today if:  ? · You have any trouble breathing. ? · Your chest pain gets worse. ? · You are dizzy or lightheaded, or you feel like you may faint. ? · You are not getting better as expected. ? · You are having new or different chest pain. Where can you learn more? Go to http://ju-caitlyn.info/. Enter A120 in the search box to learn more about \"Chest Pain: Care Instructions. \"  Current as of: March 20, 2017  Content Version: 11.4  © 9336-9387 [employer]. Care instructions adapted under license by [employer] (which disclaims liability or warranty for this information). If you have questions about a medical condition or this instruction, always ask your healthcare professional. [address] any warranty or liability for your use of this information.

## 2018-01-16 NOTE — ED TRIAGE NOTES
Patient reports CP and SOB starting last night with right shoulder pain. History of MI about 4 years ago. Denies nausea.

## 2018-04-12 ENCOUNTER — HOSPITAL ENCOUNTER (OUTPATIENT)
Dept: CT IMAGING | Age: 38
Discharge: HOME | End: 2018-04-12
Attending: FAMILY MEDICINE
Payer: COMMERCIAL

## 2018-04-12 DIAGNOSIS — G44.019: ICD-10-CM

## 2018-04-12 PROCEDURE — 70450 CT HEAD/BRAIN W/O DYE: CPT

## 2019-01-28 ENCOUNTER — APPOINTMENT (OUTPATIENT)
Dept: GENERAL RADIOLOGY | Age: 39
End: 2019-01-28
Payer: SELF-PAY

## 2019-01-28 ENCOUNTER — APPOINTMENT (OUTPATIENT)
Dept: CT IMAGING | Age: 39
End: 2019-01-28
Payer: SELF-PAY

## 2019-01-28 ENCOUNTER — HOSPITAL ENCOUNTER (EMERGENCY)
Age: 39
Discharge: HOME | End: 2019-01-28
Attending: EMERGENCY MEDICINE
Payer: SELF-PAY

## 2019-01-28 VITALS
WEIGHT: 189 LBS | HEART RATE: 82 BPM | DIASTOLIC BLOOD PRESSURE: 84 MMHG | TEMPERATURE: 98.3 F | BODY MASS INDEX: 23.5 KG/M2 | HEIGHT: 75 IN | RESPIRATION RATE: 20 BRPM | SYSTOLIC BLOOD PRESSURE: 120 MMHG | OXYGEN SATURATION: 99 %

## 2019-01-28 DIAGNOSIS — J06.9 UPPER RESPIRATORY TRACT INFECTION, UNSPECIFIED TYPE: Primary | ICD-10-CM

## 2019-01-28 DIAGNOSIS — Z72.0 TOBACCO ABUSE: ICD-10-CM

## 2019-01-28 DIAGNOSIS — D72.829 LEUKOCYTOSIS, UNSPECIFIED TYPE: ICD-10-CM

## 2019-01-28 LAB
ALBUMIN SERPL-MCNC: 4 G/DL (ref 3.5–5)
ALBUMIN/GLOB SERPL: 1 {RATIO} (ref 1.2–3.5)
ALP SERPL-CCNC: 68 U/L (ref 50–136)
ALT SERPL-CCNC: 22 U/L (ref 12–65)
ANION GAP SERPL CALC-SCNC: 9 MMOL/L (ref 7–16)
AST SERPL-CCNC: 12 U/L (ref 15–37)
ATRIAL RATE: 109 BPM
BASOPHILS # BLD: 0.2 K/UL (ref 0–0.2)
BASOPHILS NFR BLD: 1 % (ref 0–2)
BILIRUB SERPL-MCNC: 0.7 MG/DL (ref 0.2–1.1)
BUN SERPL-MCNC: 7 MG/DL (ref 6–23)
CALCIUM SERPL-MCNC: 9 MG/DL (ref 8.3–10.4)
CALCULATED P AXIS, ECG09: 70 DEGREES
CALCULATED R AXIS, ECG10: 87 DEGREES
CALCULATED T AXIS, ECG11: 61 DEGREES
CHLORIDE SERPL-SCNC: 103 MMOL/L (ref 98–107)
CO2 SERPL-SCNC: 26 MMOL/L (ref 21–32)
CREAT SERPL-MCNC: 0.85 MG/DL (ref 0.8–1.5)
DIAGNOSIS, 93000: NORMAL
DIFFERENTIAL METHOD BLD: ABNORMAL
EOSINOPHIL # BLD: 0.4 K/UL (ref 0–0.8)
EOSINOPHIL NFR BLD: 2 % (ref 0.5–7.8)
ERYTHROCYTE [DISTWIDTH] IN BLOOD BY AUTOMATED COUNT: 13.3 % (ref 11.9–14.6)
FLUAV AG NPH QL IA: NEGATIVE
FLUBV AG NPH QL IA: NEGATIVE
GLOBULIN SER CALC-MCNC: 3.9 G/DL (ref 2.3–3.5)
GLUCOSE SERPL-MCNC: 88 MG/DL (ref 65–100)
HCT VFR BLD AUTO: 44.7 % (ref 41.1–50.3)
HGB BLD-MCNC: 15.7 G/DL (ref 13.6–17.2)
IMM GRANULOCYTES # BLD AUTO: 0.1 K/UL (ref 0–0.5)
IMM GRANULOCYTES NFR BLD AUTO: 1 % (ref 0–5)
LACTATE BLD-SCNC: 1.59 MMOL/L (ref 0.5–1.9)
LYMPHOCYTES # BLD: 4.6 K/UL (ref 0.5–4.6)
LYMPHOCYTES NFR BLD: 23 % (ref 13–44)
MCH RBC QN AUTO: 32.8 PG (ref 26.1–32.9)
MCHC RBC AUTO-ENTMCNC: 35.1 G/DL (ref 31.4–35)
MCV RBC AUTO: 93.3 FL (ref 79.6–97.8)
MONOCYTES # BLD: 1.1 K/UL (ref 0.1–1.3)
MONOCYTES NFR BLD: 6 % (ref 4–12)
NEUTS SEG # BLD: 13.3 K/UL (ref 1.7–8.2)
NEUTS SEG NFR BLD: 68 % (ref 43–78)
NRBC # BLD: 0 K/UL (ref 0–0.2)
P-R INTERVAL, ECG05: 144 MS
PLATELET # BLD AUTO: 556 K/UL (ref 150–450)
PMV BLD AUTO: 8.4 FL (ref 9.4–12.3)
POTASSIUM SERPL-SCNC: 3.8 MMOL/L (ref 3.5–5.1)
PROT SERPL-MCNC: 7.9 G/DL (ref 6.3–8.2)
Q-T INTERVAL, ECG07: 328 MS
QRS DURATION, ECG06: 82 MS
QTC CALCULATION (BEZET), ECG08: 441 MS
RBC # BLD AUTO: 4.79 M/UL (ref 4.23–5.6)
SODIUM SERPL-SCNC: 138 MMOL/L (ref 136–145)
SPECIMEN SOURCE: NORMAL
TROPONIN I BLD-MCNC: 0.01 NG/ML (ref 0.02–0.05)
VENTRICULAR RATE, ECG03: 109 BPM
WBC # BLD AUTO: 19.6 K/UL (ref 4.3–11.1)

## 2019-01-28 PROCEDURE — 74011636320 HC RX REV CODE- 636/320: Performed by: EMERGENCY MEDICINE

## 2019-01-28 PROCEDURE — 74011250636 HC RX REV CODE- 250/636: Performed by: PHYSICIAN ASSISTANT

## 2019-01-28 PROCEDURE — 81003 URINALYSIS AUTO W/O SCOPE: CPT | Performed by: PHYSICIAN ASSISTANT

## 2019-01-28 PROCEDURE — 87040 BLOOD CULTURE FOR BACTERIA: CPT

## 2019-01-28 PROCEDURE — 87804 INFLUENZA ASSAY W/OPTIC: CPT

## 2019-01-28 PROCEDURE — 83605 ASSAY OF LACTIC ACID: CPT

## 2019-01-28 PROCEDURE — 96365 THER/PROPH/DIAG IV INF INIT: CPT | Performed by: PHYSICIAN ASSISTANT

## 2019-01-28 PROCEDURE — 80053 COMPREHEN METABOLIC PANEL: CPT

## 2019-01-28 PROCEDURE — 74011000258 HC RX REV CODE- 258: Performed by: PHYSICIAN ASSISTANT

## 2019-01-28 PROCEDURE — 74011000258 HC RX REV CODE- 258: Performed by: EMERGENCY MEDICINE

## 2019-01-28 PROCEDURE — 93005 ELECTROCARDIOGRAM TRACING: CPT | Performed by: EMERGENCY MEDICINE

## 2019-01-28 PROCEDURE — 84484 ASSAY OF TROPONIN QUANT: CPT

## 2019-01-28 PROCEDURE — 99285 EMERGENCY DEPT VISIT HI MDM: CPT | Performed by: PHYSICIAN ASSISTANT

## 2019-01-28 PROCEDURE — 85025 COMPLETE CBC W/AUTO DIFF WBC: CPT

## 2019-01-28 PROCEDURE — 74011250637 HC RX REV CODE- 250/637: Performed by: PHYSICIAN ASSISTANT

## 2019-01-28 PROCEDURE — 71046 X-RAY EXAM CHEST 2 VIEWS: CPT

## 2019-01-28 PROCEDURE — 71260 CT THORAX DX C+: CPT

## 2019-01-28 RX ORDER — ALBUTEROL SULFATE 90 UG/1
2 AEROSOL, METERED RESPIRATORY (INHALATION)
Qty: 1 INHALER | Refills: 0 | Status: SHIPPED | OUTPATIENT
Start: 2019-01-28 | End: 2019-08-16 | Stop reason: SDUPTHER

## 2019-01-28 RX ORDER — DEXAMETHASONE SODIUM PHOSPHATE 100 MG/10ML
10 INJECTION INTRAMUSCULAR; INTRAVENOUS
Status: COMPLETED | OUTPATIENT
Start: 2019-01-28 | End: 2019-01-28

## 2019-01-28 RX ORDER — SODIUM CHLORIDE 0.9 % (FLUSH) 0.9 %
10 SYRINGE (ML) INJECTION
Status: COMPLETED | OUTPATIENT
Start: 2019-01-28 | End: 2019-01-28

## 2019-01-28 RX ORDER — CEFDINIR 300 MG/1
300 CAPSULE ORAL 2 TIMES DAILY
Qty: 20 CAP | Refills: 0 | Status: SHIPPED | OUTPATIENT
Start: 2019-01-28 | End: 2019-02-07

## 2019-01-28 RX ADMIN — Medication 10 ML: at 21:10

## 2019-01-28 RX ADMIN — SODIUM CHLORIDE 1000 ML: 900 INJECTION, SOLUTION INTRAVENOUS at 19:00

## 2019-01-28 RX ADMIN — CEFTRIAXONE SODIUM 1 G: 1 INJECTION, POWDER, FOR SOLUTION INTRAMUSCULAR; INTRAVENOUS at 21:49

## 2019-01-28 RX ADMIN — DEXAMETHASONE SODIUM PHOSPHATE 10 MG: 10 INJECTION INTRAMUSCULAR; INTRAVENOUS at 21:59

## 2019-01-28 RX ADMIN — SODIUM CHLORIDE 100 ML: 900 INJECTION, SOLUTION INTRAVENOUS at 21:10

## 2019-01-28 RX ADMIN — IOPAMIDOL 100 ML: 755 INJECTION, SOLUTION INTRAVENOUS at 21:10

## 2019-01-28 NOTE — ED TRIAGE NOTES
Patient reports flu-like symptoms. Fever, chills, cough and runny nose. Increased weakness since this morning. Patient reports some SOB.

## 2019-01-29 NOTE — DISCHARGE INSTRUCTIONS
May use Tylenol or Motrin over-the-counter as directed for pain or fever. Patient Education        Learning About Benefits From Quitting Smoking  How does quitting smoking make you healthier? If you're thinking about quitting smoking, you may have a few reasons to be smoke-free. Your health may be one of them. · When you quit smoking, you lower your risks for cancer, lung disease, heart attack, stroke, blood vessel disease, and blindness from macular degeneration. · When you're smoke-free, you get sick less often, and you heal faster. You are less likely to get colds, flu, bronchitis, and pneumonia. · As a nonsmoker, you may find that your mood is better and you are less stressed. When and how will you feel healthier? Quitting has real health benefits that start from day 1 of being smoke-free. And the longer you stay smoke-free, the healthier you get and the better you feel. The first hours  · After just 20 minutes, your blood pressure and heart rate go down. That means there's less stress on your heart and blood vessels. · Within 12 hours, the level of carbon monoxide in your blood drops back to normal. That makes room for more oxygen. With more oxygen in your body, you may notice that you have more energy than when you smoked. After 2 weeks  · Your lungs start to work better. · Your risk of heart attack starts to drop. After 1 month  · When your lungs are clear, you cough less and breathe deeper, so it's easier to be active. · Your sense of taste and smell return. That means you can enjoy food more than you have since you started smoking. Over the years  · After 1 year, your risk of heart disease is half what it would be if you kept smoking. · After 5 years, your risk of stroke starts to shrink. Within a few years after that, it's about the same as if you'd never smoked. · After 10 years, your risk of dying from lung cancer is cut by about half.  And your risk for many other types of cancer is lower too. How would quitting help others in your life? When you quit smoking, you improve the health of everyone who now breathes in your smoke. · Their heart, lung, and cancer risks drop, much like yours. · They are sick less. For babies and small children, living smoke-free means they're less likely to have ear infections, pneumonia, and bronchitis. · If you're a woman who is or will be pregnant someday, quitting smoking means a healthier . · Children who are close to you are less likely to become adult smokers. Where can you learn more? Go to http://jusCoolTVcaitlyn.info/. Enter 052 [id number] [id number] in the search box to learn more about \"Learning About Benefits From Quitting Smoking. \"  Current as of: 2018  Content Version: 11.9  © 5908-7363 [employer]. Care instructions adapted under license by [employer] (which disclaims liability or warranty for this information). If you have questions about a medical condition or this instruction, always ask your healthcare professional. [place] [address] any warranty or liability for your use of this information. Patient Education        Upper Respiratory Infection (Cold): Care Instructions  Your Care Instructions    An upper respiratory infection, or URI, is an infection of the nose, sinuses, or throat. URIs are spread by coughs, sneezes, and direct contact. The common cold is the most frequent kind of URI. The flu and sinus infections are other kinds of URIs. Almost all URIs are caused by viruses. Antibiotics won't cure them. But you can treat most infections with home care. This may include drinking lots of fluids and taking over-the-counter pain medicine. You will probably feel better in 4 to 10 days. The doctor has checked you carefully, but problems can develop later. If you notice any problems or new symptoms, get medical treatment right away.   Follow-up care is a key part of your treatment and safety. Be sure to make and go to all appointments, and call your doctor if you are having problems. It's also a good idea to know your test results and keep a list of the medicines you take. How can you care for yourself at home? · To prevent dehydration, drink plenty of fluids, enough so that your urine is light yellow or clear like water. Choose water and other caffeine-free clear liquids until you feel better. If you have kidney, heart, or liver disease and have to limit fluids, talk with your doctor before you increase the amount of fluids you drink. · Take an over-the-counter pain medicine, such as acetaminophen (Tylenol), ibuprofen (Advil, Motrin), or naproxen (Aleve). Read and follow all instructions on the label. · Before you use cough and cold medicines, check the label. These medicines may not be safe for young children or for people with certain health problems. · Be careful when taking over-the-counter cold or flu medicines and Tylenol at the same time. Many of these medicines have acetaminophen, which is Tylenol. Read the labels to make sure that you are not taking more than the recommended dose. Too much acetaminophen (Tylenol) can be harmful. · Get plenty of rest.  · Do not smoke or allow others to smoke around you. If you need help quitting, talk to your doctor about stop-smoking programs and medicines. These can increase your chances of quitting for good. When should you call for help? Call 911 anytime you think you may need emergency care.  For example, call if:    · You have severe trouble breathing.    Call your doctor now or seek immediate medical care if:    · You seem to be getting much sicker.     · You have new or worse trouble breathing.     · You have a new or higher fever.     · You have a new rash.    Watch closely for changes in your health, and be sure to contact your doctor if:    · You have a new symptom, such as a sore throat, an earache, or sinus pain.     · You cough more deeply or more often, especially if you notice more mucus or a change in the color of your mucus.     · You do not get better as expected. Where can you learn more? Go to http://ju-caitlyn.info/. Enter Z885 in the search box to learn more about \"Upper Respiratory Infection (Cold): Care Instructions. \"  Current as of: September 5, 2018  Content Version: 11.9  © 9317-7829 [employer]. Care instructions adapted under license by [employer] (which disclaims liability or warranty for this information). If you have questions about a medical condition or this instruction, always ask your healthcare professional. [address] any warranty or liability for your use of this information. Patient Education        Learning About High White Blood Cell Counts  What are white blood cells? White blood cells (leukocytes) help protect your body from infection. Normally, when germs get inside your body, your body makes more white blood cells that search for and destroy the germs. Less often, there are medical problems where the body may make a lot more white blood cells than it needs. What happens when you have a high white blood cell count? Your white blood cell count may be high because your body is fighting an infection. But other things can cause it, such as some medicines, burns, an illness, or other health problems. When your doctor sees that your white blood cell count is high, he or she will try to find out why, and then treat the cause. What are the symptoms? A high white blood cell count alone doesn't cause any symptoms. The symptoms you feel may come from the medical problem that your white blood cells are fighting. For example, if you have pneumonia, you may have a fever and trouble breathing. These are symptoms of pneumonia, not of a high white blood cell count. How is it treated?   · Your doctor may do more tests to find the problem that's making your white blood cell count high. Once your doctor finds the problem, he or she may be able to treat it. · Part of your treatment may be telling your doctor if you feel worse. Watch your temperature, and call your doctor if your fever goes up and stays up. Follow-up care is a key part of your treatment and safety. Be sure to make and go to all appointments, and call your doctor if you are having problems. It's also a good idea to know your test results and keep a list of the medicines you take. Where can you learn more? Go to http://ju-caitlyn.info/. Enter Q817 in the search box to learn more about \"Learning About High White Blood Cell Counts. \"  Current as of: June 25, 2018  Content Version: 11.9  © 6768-1112 [employer]. Care instructions adapted under license by [employer] (which disclaims liability or warranty for this information). If you have questions about a medical condition or this instruction, always ask your healthcare professional. [address] any warranty or liability for your use of this information.

## 2019-01-29 NOTE — ED NOTES
I have reviewed discharge instructions with the patient. The patient verbalized understanding. Patient left ED via Discharge Method: ambulatory to Home with spouse. Opportunity for questions and clarification provided. Patient given 2 scripts. To continue your aftercare when you leave the hospital, you may receive an automated call from our care team to check in on how you are doing. This is a free service and part of our promise to provide the best care and service to meet your aftercare needs.  If you have questions, or wish to unsubscribe from this service please call [phone]. Thank you for Choosing our [facility].

## 2019-01-29 NOTE — ED PROVIDER NOTES
42-year-old  male with history of spherocytosis status post splenectomy in 1987 also with non-STEMI 4 years ago no intervention to small RCA occlusion presents stating that he has weakness, dry cough, nasal congestion, sore throat all started today. He denies any abdominal pain. He states he is status post appendectomy. Denies any nausea, vomiting or diarrhea. Patient states he has some chest discomfort with cough. The history is provided by the patient. Flu This is a new problem. The current episode started 12 to 24 hours ago. The problem occurs constantly. The problem has not changed since onset. The cough is non-productive. There has been no fever. Associated symptoms include chest pain, chills, rhinorrhea, sore throat, myalgias and shortness of breath. Pertinent negatives include no sweats, no ear pain, no headaches, no wheezing, no nausea and no vomiting. He has tried nothing for the symptoms. He is a smoker. His past medical history is significant for bronchitis. Past Medical History:  
Diagnosis Date  Anxiety  GERD (gastroesophageal reflux disease)   
 pt treats with tums- resolves per pt- sleeps 1 pillow  Hypertension   
 managed with meds  NSTEMI (non-ST elevated myocardial infarction) ([facility] [address] 8/14/2014  
 no stents- no angioplasty-- treatment with medication only  Other ill-defined conditions(799.89)   
 spirocytosis, high cholesterol  PUD (peptic ulcer disease) 2014  
 resolved per pt/ treated  Spherocytosis ([facility] [address]  URI (upper respiratory infection) 8/14/2014 Past Surgical History:  
Procedure Laterality Date  HX APPENDECTOMY [address]  HX GI    
 spleen removed  HX HEART CATHETERIZATION  2014  
 no intervention  HX SPLENECTOMY  1987  
 and cholecystectomy  HX TONSILLECTOMY  HX WRIST FRACTURE TX Right 03/2017  
 titanium donny/screws Family History:  
Problem Relation Age of Onset  Heart Disease Mother  Hypertension Mother  Heart Disease Father  Hypertension Father  Stroke Maternal Grandfather  Heart Disease Paternal Grandfather  Diabetes Paternal Grandfather Social History Socioeconomic History  Marital status:  Spouse name: Not on file  Number of children: Not on file  Years of education: Not on file  Highest education level: Not on file Social Needs  Financial resource strain: Not on file  Food insecurity - worry: Not on file  Food insecurity - inability: Not on file  Transportation needs - medical: Not on file  Transportation needs - non-medical: Not on file Occupational History  Not on file Tobacco Use  Smoking status: Current Every Day Smoker Packs/day: 1.00 Years: 23.00 Pack years: 23.00  Smokeless tobacco: Current User Types: Chew  Tobacco comment: uses chewing tobacco occasionally Substance and Sexual Activity  Alcohol use: Yes Alcohol/week: 0.0 oz  
  Comment: 1-2 / week  Drug use: Yes Types: Prescription  Sexual activity: Yes  
  Partners: Female Other Topics Concern  Not on file Social History Narrative  Not on file ALLERGIES: Patient has no known allergies. Review of Systems Constitutional: Positive for appetite change, chills and fatigue. Negative for fever. HENT: Positive for rhinorrhea and sore throat. Negative for ear pain. Eyes: Negative for photophobia. Respiratory: Positive for cough and shortness of breath. Negative for chest tightness and wheezing. Cardiovascular: Positive for chest pain. Gastrointestinal: Negative for nausea and vomiting. Genitourinary: Negative. Musculoskeletal: Positive for myalgias. Negative for neck pain and neck stiffness. Skin: Negative. Neurological: Positive for weakness. Negative for dizziness, light-headedness and headaches. All other systems reviewed and are negative. Vitals:  
 01/28/19 1824 BP: (!) 147/92 Pulse: (!) 105 Resp: 18 Temp: 98.9 °F (37.2 °C) SpO2: 97% Weight: 85.7 kg (189 lb) Height: 6' 3\" (1.905 m) Physical Exam  
Constitutional: He is oriented to person, place, and time. He appears well-developed and well-nourished. He is active. Non-toxic appearance. He appears ill. No distress. Patient is mildly tachycardic and hypertensive upon initial presentation. He appears ill but not toxic. HENT:  
Head: Normocephalic and atraumatic. Right Ear: Tympanic membrane normal.  
Left Ear: Tympanic membrane normal.  
Nose: Nose normal.  
Mouth/Throat: Uvula is midline and mucous membranes are normal. No trismus in the jaw. No uvula swelling. Posterior oropharyngeal erythema present. No oropharyngeal exudate, posterior oropharyngeal edema or tonsillar abscesses. No tonsillar exudate. Eyes: Conjunctivae and EOM are normal. Pupils are equal, round, and reactive to light. Neck: Normal range of motion and full passive range of motion without pain. Neck supple. No neck rigidity. No meningismus Cardiovascular: Regular rhythm, S1 normal, S2 normal and normal heart sounds. Tachycardia present. Exam reveals no gallop. No murmur heard. Pulmonary/Chest: Effort normal and breath sounds normal. No stridor. No respiratory distress. He has no wheezes. He has no rhonchi. He has no rales. Abdominal: Soft. Bowel sounds are normal. He exhibits no distension and no mass. There is no tenderness. There is no guarding. Lymphadenopathy:  
  He has no cervical adenopathy. Neurological: He is alert and oriented to person, place, and time. Skin: Skin is warm and dry. Capillary refill takes less than 2 seconds. No rash noted. Nursing note and vitals reviewed. MDM Number of Diagnoses or Management Options Leukocytosis, unspecified type: new and requires workup Tobacco abuse: established and worsening Upper respiratory tract infection, unspecified type: new and requires workup Diagnosis management comments: Pt. Discussed with ED attending Dr. Lucila Allen. Concern for pneumonia. No source of infection identified as of yet. Not sure why patient has had sporadic leukocytosis. CT scan performed to rule out occult pneumonia or other possible source. This was negative. Patient states he feels weak. In reviewing previous medical records, it appears that this patient has had elevated white blood cell counts in the past.  Not sure what this is indicative of. Patient does have an appointment with his primary care provider Dr. Mardeen Galeazzi tomorrow. We'll administer Rocephin 1 g IV in the ER and patient is encouraged to keep this primary care appointment tomorrow regardless of how he is feeling. Amount and/or Complexity of Data Reviewed Clinical lab tests: ordered and reviewed Tests in the radiology section of CPT®: ordered and reviewed Review and summarize past medical records: yes Discuss the patient with other providers: yes Risk of Complications, Morbidity, and/or Mortality Presenting problems: moderate Diagnostic procedures: moderate Management options: moderate Patient Progress Patient progress: stable Procedures CT CHEST W CONT Final Result IMPRESSION:  
  
No acute abnormalities. Gallstones. Splenectomy. Date of Dictation: 1/28/2019 9:25 PM  
  
  
XR CHEST PA LAT Final Result Impression:  Normal chest radiograph. No significant interval change. Recent Results (from the past 12 hour(s)) EKG, 12 LEAD, INITIAL Collection Time: 01/28/19  6:29 PM  
Result Value Ref Range Ventricular Rate 109 BPM  
 Atrial Rate 109 BPM  
 P-R Interval 144 ms QRS Duration 82 ms Q-T Interval 328 ms QTC Calculation (Bezet) 441 ms Calculated P Axis 70 degrees Calculated R Axis 87 degrees Calculated T Axis 61 degrees Diagnosis Sinus tachycardia Otherwise normal ECG When compared with ECG of 16-JAN-2018 10:12, No significant change was found Confirmed by Kassidy Roberts ([id number]) on 1/28/2019 9:20:58 PM 
  
INFLUENZA A & B AG (RAPID TEST) Collection Time: 01/28/19  6:30 PM  
Result Value Ref Range Influenza A Ag NEGATIVE  NEG Influenza B Ag NEGATIVE  NEG Source NASOPHARYNGEAL    
CBC WITH AUTOMATED DIFF Collection Time: 01/28/19  6:31 PM  
Result Value Ref Range WBC 19.6 (H) 4.3 - 11.1 K/uL  
 RBC 4.79 4.23 - 5.6 M/uL  
 HGB 15.7 13.6 - 17.2 g/dL HCT 44.7 41.1 - 50.3 % MCV 93.3 79.6 - 97.8 FL  
 MCH 32.8 26.1 - 32.9 PG  
 MCHC 35.1 (H) 31.4 - 35.0 g/dL  
 RDW 13.3 11.9 - 14.6 % PLATELET 687 (H) 407 - 450 K/uL MPV 8.4 (L) 9.4 - 12.3 FL ABSOLUTE NRBC 0.00 0.0 - 0.2 K/uL  
 DF AUTOMATED NEUTROPHILS 68 43 - 78 % LYMPHOCYTES 23 13 - 44 % MONOCYTES 6 4.0 - 12.0 % EOSINOPHILS 2 0.5 - 7.8 % BASOPHILS 1 0.0 - 2.0 % IMMATURE GRANULOCYTES 1 0.0 - 5.0 %  
 ABS. NEUTROPHILS 13.3 (H) 1.7 - 8.2 K/UL  
 ABS. LYMPHOCYTES 4.6 0.5 - 4.6 K/UL  
 ABS. MONOCYTES 1.1 0.1 - 1.3 K/UL  
 ABS. EOSINOPHILS 0.4 0.0 - 0.8 K/UL  
 ABS. BASOPHILS 0.2 0.0 - 0.2 K/UL  
 ABS. IMM. GRANS. 0.1 0.0 - 0.5 K/UL METABOLIC PANEL, COMPREHENSIVE Collection Time: 01/28/19  6:31 PM  
Result Value Ref Range Sodium 138 136 - 145 mmol/L Potassium 3.8 3.5 - 5.1 mmol/L Chloride 103 98 - 107 mmol/L  
 CO2 26 21 - 32 mmol/L Anion gap 9 7 - 16 mmol/L Glucose 88 65 - 100 mg/dL BUN 7 6 - 23 MG/DL Creatinine 0.85 0.8 - 1.5 MG/DL  
 GFR est AA >60 >60 ml/min/1.73m2 GFR est non-AA >60 >60 ml/min/1.73m2 Calcium 9.0 8.3 - 10.4 MG/DL Bilirubin, total 0.7 0.2 - 1.1 MG/DL  
 ALT (SGPT) 22 12 - 65 U/L  
 AST (SGOT) 12 (L) 15 - 37 U/L Alk. phosphatase 68 50 - 136 U/L Protein, total 7.9 6.3 - 8.2 g/dL Albumin 4.0 3.5 - 5.0 g/dL Globulin 3.9 (H) 2.3 - 3.5 g/dL A-G Ratio 1.0 (L) 1.2 - 3.5 POC TROPONIN-I  
 Collection Time: 01/28/19  6:35 PM  
Result Value Ref Range Troponin-I (POC) 0.01 (L) 0.02 - 0.05 ng/ml POC LACTIC ACID Collection Time: 01/28/19  8:10 PM  
Result Value Ref Range Lactic Acid (POC) 1.59 0.5 - 1.9 mmol/L I discussed the results of all labs, procedures, radiographs, and treatments with the patient and available family. Treatment plan is agreed upon and the patient is ready for discharge. Questions about treatment in the ED and differential diagnosis of presenting condition were answered. Patient was given verbal discharge instructions including, but not limited to, importance of returning to the emergency department for any concern of worsening or continued symptoms. Instructions were given to follow up with a primary care provider or specialist within 1-2 days. Adverse effects of medications, if prescribed, were discussed and patient was advised to refrain from significant physical activity until followed up by primary care physician and to not drive or operate heavy machinery after taking any sedating substances.

## 2019-02-02 LAB
BACTERIA SPEC CULT: NORMAL
BACTERIA SPEC CULT: NORMAL
SERVICE CMNT-IMP: NORMAL
SERVICE CMNT-IMP: NORMAL

## 2020-02-02 ENCOUNTER — APPOINTMENT (OUTPATIENT)
Dept: CT IMAGING | Age: 40
DRG: 176 | End: 2020-02-02
Attending: EMERGENCY MEDICINE
Payer: COMMERCIAL

## 2020-02-02 ENCOUNTER — APPOINTMENT (OUTPATIENT)
Dept: GENERAL RADIOLOGY | Age: 40
DRG: 176 | End: 2020-02-02
Attending: EMERGENCY MEDICINE
Payer: COMMERCIAL

## 2020-02-02 ENCOUNTER — APPOINTMENT (OUTPATIENT)
Dept: ULTRASOUND IMAGING | Age: 40
DRG: 176 | End: 2020-02-02
Attending: FAMILY MEDICINE
Payer: COMMERCIAL

## 2020-02-02 ENCOUNTER — HOSPITAL ENCOUNTER (INPATIENT)
Age: 40
LOS: 1 days | Discharge: HOME | DRG: 176 | End: 2020-02-03
Attending: EMERGENCY MEDICINE | Admitting: FAMILY MEDICINE
Payer: COMMERCIAL

## 2020-02-02 DIAGNOSIS — I26.09 OTHER ACUTE PULMONARY EMBOLISM WITH ACUTE COR PULMONALE (HCC): Primary | ICD-10-CM

## 2020-02-02 PROBLEM — D58.0 HEREDITARY SPHEROCYTOSIS (HCC): Status: ACTIVE | Noted: 2020-02-02

## 2020-02-02 PROBLEM — I26.99 PULMONARY EMBOLISM (HCC): Status: ACTIVE | Noted: 2020-02-02

## 2020-02-02 PROBLEM — Z91.199 NONCOMPLIANCE: Status: ACTIVE | Noted: 2020-02-02

## 2020-02-02 PROBLEM — I25.10 CAD (CORONARY ARTERY DISEASE): Status: ACTIVE | Noted: 2020-02-02

## 2020-02-02 PROBLEM — F41.9 ANXIETY: Status: ACTIVE | Noted: 2020-02-02

## 2020-02-02 LAB
ALBUMIN SERPL-MCNC: 4.1 G/DL (ref 3.5–5)
ALBUMIN/GLOB SERPL: 1 {RATIO} (ref 1.2–3.5)
ALP SERPL-CCNC: 71 U/L (ref 50–136)
ALT SERPL-CCNC: 33 U/L (ref 12–65)
ANION GAP SERPL CALC-SCNC: 7 MMOL/L (ref 7–16)
APTT PPP: 27 SEC (ref 24.7–39.8)
APTT PPP: 83.2 SEC (ref 24.7–39.8)
AST SERPL-CCNC: 15 U/L (ref 15–37)
BASOPHILS # BLD: 0.1 K/UL (ref 0–0.2)
BASOPHILS NFR BLD: 1 % (ref 0–2)
BILIRUB SERPL-MCNC: 1.2 MG/DL (ref 0.2–1.1)
BUN SERPL-MCNC: 8 MG/DL (ref 6–23)
CALCIUM SERPL-MCNC: 9.4 MG/DL (ref 8.3–10.4)
CHLORIDE SERPL-SCNC: 107 MMOL/L (ref 98–107)
CO2 SERPL-SCNC: 25 MMOL/L (ref 21–32)
CREAT SERPL-MCNC: 0.86 MG/DL (ref 0.8–1.5)
D DIMER PPP FEU-MCNC: 8.37 UG/ML(FEU)
DIFFERENTIAL METHOD BLD: ABNORMAL
EOSINOPHIL # BLD: 0.2 K/UL (ref 0–0.8)
EOSINOPHIL NFR BLD: 1 % (ref 0.5–7.8)
ERYTHROCYTE [DISTWIDTH] IN BLOOD BY AUTOMATED COUNT: 13.4 % (ref 11.9–14.6)
ETHANOL SERPL-MCNC: 11 MG/DL
GLOBULIN SER CALC-MCNC: 4.1 G/DL (ref 2.3–3.5)
GLUCOSE SERPL-MCNC: 79 MG/DL (ref 65–100)
HCT VFR BLD AUTO: 50.6 % (ref 41.1–50.3)
HGB BLD-MCNC: 18.3 G/DL (ref 13.6–17.2)
IMM GRANULOCYTES # BLD AUTO: 0.1 K/UL (ref 0–0.5)
IMM GRANULOCYTES NFR BLD AUTO: 0 % (ref 0–5)
LYMPHOCYTES # BLD: 2.5 K/UL (ref 0.5–4.6)
LYMPHOCYTES NFR BLD: 18 % (ref 13–44)
MAGNESIUM SERPL-MCNC: 2.2 MG/DL (ref 1.8–2.4)
MCH RBC QN AUTO: 34.1 PG (ref 26.1–32.9)
MCHC RBC AUTO-ENTMCNC: 36.2 G/DL (ref 31.4–35)
MCV RBC AUTO: 94.2 FL (ref 79.6–97.8)
MONOCYTES # BLD: 1.1 K/UL (ref 0.1–1.3)
MONOCYTES NFR BLD: 8 % (ref 4–12)
NEUTS SEG # BLD: 9.9 K/UL (ref 1.7–8.2)
NEUTS SEG NFR BLD: 72 % (ref 43–78)
NRBC # BLD: 0 K/UL (ref 0–0.2)
PLATELET # BLD AUTO: 406 K/UL (ref 150–450)
PMV BLD AUTO: 8.2 FL (ref 9.4–12.3)
POTASSIUM SERPL-SCNC: 3.8 MMOL/L (ref 3.5–5.1)
PROT SERPL-MCNC: 8.2 G/DL (ref 6.3–8.2)
RBC # BLD AUTO: 5.37 M/UL (ref 4.23–5.6)
SODIUM SERPL-SCNC: 139 MMOL/L (ref 136–145)
TROPONIN I SERPL-MCNC: <0.02 NG/ML (ref 0.02–0.05)
TROPONIN I SERPL-MCNC: <0.02 NG/ML (ref 0.02–0.05)
WBC # BLD AUTO: 13.8 K/UL (ref 4.3–11.1)

## 2020-02-02 PROCEDURE — 85730 THROMBOPLASTIN TIME PARTIAL: CPT

## 2020-02-02 PROCEDURE — 65660000000 HC RM CCU STEPDOWN

## 2020-02-02 PROCEDURE — 96374 THER/PROPH/DIAG INJ IV PUSH: CPT

## 2020-02-02 PROCEDURE — 36415 COLL VENOUS BLD VENIPUNCTURE: CPT

## 2020-02-02 PROCEDURE — 83735 ASSAY OF MAGNESIUM: CPT

## 2020-02-02 PROCEDURE — 74011000258 HC RX REV CODE- 258: Performed by: EMERGENCY MEDICINE

## 2020-02-02 PROCEDURE — 71045 X-RAY EXAM CHEST 1 VIEW: CPT

## 2020-02-02 PROCEDURE — 74011250636 HC RX REV CODE- 250/636: Performed by: EMERGENCY MEDICINE

## 2020-02-02 PROCEDURE — 94640 AIRWAY INHALATION TREATMENT: CPT

## 2020-02-02 PROCEDURE — 84484 ASSAY OF TROPONIN QUANT: CPT

## 2020-02-02 PROCEDURE — 74011000250 HC RX REV CODE- 250: Performed by: FAMILY MEDICINE

## 2020-02-02 PROCEDURE — 85025 COMPLETE CBC W/AUTO DIFF WBC: CPT

## 2020-02-02 PROCEDURE — 74011250637 HC RX REV CODE- 250/637: Performed by: FAMILY MEDICINE

## 2020-02-02 PROCEDURE — 74011636320 HC RX REV CODE- 636/320: Performed by: EMERGENCY MEDICINE

## 2020-02-02 PROCEDURE — 96375 TX/PRO/DX INJ NEW DRUG ADDON: CPT

## 2020-02-02 PROCEDURE — 81003 URINALYSIS AUTO W/O SCOPE: CPT

## 2020-02-02 PROCEDURE — 85379 FIBRIN DEGRADATION QUANT: CPT

## 2020-02-02 PROCEDURE — 93970 EXTREMITY STUDY: CPT

## 2020-02-02 PROCEDURE — 80307 DRUG TEST PRSMV CHEM ANLYZR: CPT

## 2020-02-02 PROCEDURE — 93005 ELECTROCARDIOGRAM TRACING: CPT | Performed by: EMERGENCY MEDICINE

## 2020-02-02 PROCEDURE — 99284 EMERGENCY DEPT VISIT MOD MDM: CPT

## 2020-02-02 PROCEDURE — 80053 COMPREHEN METABOLIC PANEL: CPT

## 2020-02-02 PROCEDURE — 71260 CT THORAX DX C+: CPT

## 2020-02-02 RX ORDER — CLONAZEPAM 0.5 MG/1
1 TABLET ORAL
Status: DISCONTINUED | OUTPATIENT
Start: 2020-02-02 | End: 2020-02-03 | Stop reason: HOSPADM

## 2020-02-02 RX ORDER — IBUPROFEN 200 MG
1 TABLET ORAL EVERY 24 HOURS
Status: DISCONTINUED | OUTPATIENT
Start: 2020-02-02 | End: 2020-02-03 | Stop reason: HOSPADM

## 2020-02-02 RX ORDER — ONDANSETRON 2 MG/ML
4 INJECTION INTRAMUSCULAR; INTRAVENOUS
Status: DISCONTINUED | OUTPATIENT
Start: 2020-02-02 | End: 2020-02-03 | Stop reason: HOSPADM

## 2020-02-02 RX ORDER — HEPARIN SODIUM 5000 [USP'U]/100ML
18-36 INJECTION, SOLUTION INTRAVENOUS
Status: DISCONTINUED | OUTPATIENT
Start: 2020-02-02 | End: 2020-02-03

## 2020-02-02 RX ORDER — PROCHLORPERAZINE EDISYLATE 5 MG/ML
10 INJECTION INTRAMUSCULAR; INTRAVENOUS
Status: COMPLETED | OUTPATIENT
Start: 2020-02-02 | End: 2020-02-02

## 2020-02-02 RX ORDER — LANOLIN ALCOHOL/MO/W.PET/CERES
100 CREAM (GRAM) TOPICAL DAILY
Status: DISCONTINUED | OUTPATIENT
Start: 2020-02-02 | End: 2020-02-03 | Stop reason: HOSPADM

## 2020-02-02 RX ORDER — SODIUM CHLORIDE 0.9 % (FLUSH) 0.9 %
10 SYRINGE (ML) INJECTION
Status: COMPLETED | OUTPATIENT
Start: 2020-02-02 | End: 2020-02-02

## 2020-02-02 RX ORDER — SODIUM CHLORIDE 9 MG/ML
1000 INJECTION, SOLUTION INTRAVENOUS ONCE
Status: COMPLETED | OUTPATIENT
Start: 2020-02-02 | End: 2020-02-02

## 2020-02-02 RX ORDER — IPRATROPIUM BROMIDE AND ALBUTEROL SULFATE 2.5; .5 MG/3ML; MG/3ML
SOLUTION RESPIRATORY (INHALATION)
Status: DISCONTINUED
Start: 2020-02-02 | End: 2020-02-02 | Stop reason: CLARIF

## 2020-02-02 RX ORDER — HEPARIN SODIUM 5000 [USP'U]/ML
5000 INJECTION, SOLUTION INTRAVENOUS; SUBCUTANEOUS
Status: COMPLETED | OUTPATIENT
Start: 2020-02-02 | End: 2020-02-02

## 2020-02-02 RX ORDER — LORAZEPAM 2 MG/ML
1 INJECTION INTRAMUSCULAR
Status: DISCONTINUED | OUTPATIENT
Start: 2020-02-02 | End: 2020-02-03 | Stop reason: HOSPADM

## 2020-02-02 RX ORDER — HEPARIN SODIUM 5000 [USP'U]/ML
1800 INJECTION, SOLUTION INTRAVENOUS; SUBCUTANEOUS
Status: COMPLETED | OUTPATIENT
Start: 2020-02-02 | End: 2020-02-02

## 2020-02-02 RX ORDER — KETOROLAC TROMETHAMINE 30 MG/ML
30 INJECTION, SOLUTION INTRAMUSCULAR; INTRAVENOUS
Status: COMPLETED | OUTPATIENT
Start: 2020-02-02 | End: 2020-02-02

## 2020-02-02 RX ORDER — ALBUTEROL SULFATE 0.83 MG/ML
2.5 SOLUTION RESPIRATORY (INHALATION)
Status: DISCONTINUED | OUTPATIENT
Start: 2020-02-02 | End: 2020-02-03 | Stop reason: HOSPADM

## 2020-02-02 RX ORDER — GUAIFENESIN 100 MG/5ML
81 LIQUID (ML) ORAL DAILY
COMMUNITY
End: 2020-02-03

## 2020-02-02 RX ORDER — ACETAMINOPHEN 325 MG/1
650 TABLET ORAL
Status: DISCONTINUED | OUTPATIENT
Start: 2020-02-02 | End: 2020-02-03 | Stop reason: HOSPADM

## 2020-02-02 RX ORDER — HYDROCODONE BITARTRATE AND ACETAMINOPHEN 5; 325 MG/1; MG/1
1 TABLET ORAL
Status: DISCONTINUED | OUTPATIENT
Start: 2020-02-02 | End: 2020-02-03 | Stop reason: HOSPADM

## 2020-02-02 RX ORDER — MORPHINE SULFATE 2 MG/ML
1 INJECTION, SOLUTION INTRAMUSCULAR; INTRAVENOUS
Status: DISCONTINUED | OUTPATIENT
Start: 2020-02-02 | End: 2020-02-03 | Stop reason: HOSPADM

## 2020-02-02 RX ORDER — CITALOPRAM 20 MG/1
20 TABLET, FILM COATED ORAL DAILY
COMMUNITY
End: 2020-02-04 | Stop reason: SDUPTHER

## 2020-02-02 RX ORDER — ATORVASTATIN CALCIUM 40 MG/1
80 TABLET, FILM COATED ORAL DAILY
Status: DISCONTINUED | OUTPATIENT
Start: 2020-02-03 | End: 2020-02-03 | Stop reason: HOSPADM

## 2020-02-02 RX ORDER — NALOXONE HYDROCHLORIDE 0.4 MG/ML
0.4 INJECTION, SOLUTION INTRAMUSCULAR; INTRAVENOUS; SUBCUTANEOUS AS NEEDED
Status: DISCONTINUED | OUTPATIENT
Start: 2020-02-02 | End: 2020-02-03 | Stop reason: HOSPADM

## 2020-02-02 RX ORDER — DIPHENHYDRAMINE HYDROCHLORIDE 50 MG/ML
12.5 INJECTION, SOLUTION INTRAMUSCULAR; INTRAVENOUS
Status: DISCONTINUED | OUTPATIENT
Start: 2020-02-02 | End: 2020-02-03 | Stop reason: HOSPADM

## 2020-02-02 RX ORDER — FOLIC ACID 1 MG/1
1 TABLET ORAL DAILY
Status: DISCONTINUED | OUTPATIENT
Start: 2020-02-02 | End: 2020-02-03 | Stop reason: HOSPADM

## 2020-02-02 RX ORDER — GUAIFENESIN 100 MG/5ML
81 LIQUID (ML) ORAL DAILY
Status: DISCONTINUED | OUTPATIENT
Start: 2020-02-03 | End: 2020-02-03 | Stop reason: HOSPADM

## 2020-02-02 RX ORDER — SODIUM CHLORIDE 0.9 % (FLUSH) 0.9 %
5-40 SYRINGE (ML) INJECTION AS NEEDED
Status: DISCONTINUED | OUTPATIENT
Start: 2020-02-02 | End: 2020-02-03 | Stop reason: HOSPADM

## 2020-02-02 RX ORDER — SODIUM CHLORIDE 0.9 % (FLUSH) 0.9 %
5-40 SYRINGE (ML) INJECTION EVERY 8 HOURS
Status: DISCONTINUED | OUTPATIENT
Start: 2020-02-02 | End: 2020-02-03 | Stop reason: HOSPADM

## 2020-02-02 RX ADMIN — FOLIC ACID 1 MG: 1 TABLET ORAL at 16:00

## 2020-02-02 RX ADMIN — HEPARIN SODIUM 18 UNITS/KG/HR: 5000 INJECTION, SOLUTION INTRAVENOUS at 15:21

## 2020-02-02 RX ADMIN — HEPARIN SODIUM 1800 UNITS: 5000 INJECTION INTRAVENOUS; SUBCUTANEOUS at 15:38

## 2020-02-02 RX ADMIN — HEPARIN SODIUM 5000 UNITS: 5000 INJECTION INTRAVENOUS; SUBCUTANEOUS at 15:22

## 2020-02-02 RX ADMIN — Medication 10 ML: at 13:44

## 2020-02-02 RX ADMIN — SODIUM CHLORIDE 1000 ML: 900 INJECTION, SOLUTION INTRAVENOUS at 11:42

## 2020-02-02 RX ADMIN — IOPAMIDOL 75 ML: 755 INJECTION, SOLUTION INTRAVENOUS at 13:44

## 2020-02-02 RX ADMIN — ALBUTEROL SULFATE 2.5 MG: 2.5 SOLUTION RESPIRATORY (INHALATION) at 20:05

## 2020-02-02 RX ADMIN — Medication 10 ML: at 21:42

## 2020-02-02 RX ADMIN — SODIUM CHLORIDE 100 ML: 900 INJECTION, SOLUTION INTRAVENOUS at 13:44

## 2020-02-02 RX ADMIN — Medication 100 MG: at 16:00

## 2020-02-02 RX ADMIN — PROCHLORPERAZINE EDISYLATE 10 MG: 5 INJECTION INTRAMUSCULAR; INTRAVENOUS at 11:42

## 2020-02-02 RX ADMIN — KETOROLAC TROMETHAMINE 30 MG: 30 INJECTION, SOLUTION INTRAMUSCULAR at 11:42

## 2020-02-02 NOTE — H&P
Hospitalist H&P Note     Admit Date:  2020 11:23 AM   Name:  Adams Agent   Age:  46 y.o.  :  1980   MRN:  [id number]   PCP:  Loretta Cuevas MD  Treatment Team: Primary Nurse: Tara Nevarez RN  Bilateral lower extremity cramps, shortness of breath and any chest pain  HPI:   81-year-old  male patient with a known history of hereditary Spherocytosis with a splenectomy, coronary disease with history of cardiac cath in , hyperlipidemia, anxiety and COPD. No nicotine abuse and alcohol use. Patient woke up this morning with the bilateral legs cramps, started having shortness of breath and later on midsternal chest pain. Says shortness of breath or walking short distance. Brought to the emergency room, patient was given Toradol and Compazine, at the time of my examination, no chest pain no shortness of breath. Patient otherwise did not complain of any headache or any dizziness or any hemoptysis or any hematemesis or new leg swelling. D-dimer of 8.37.  CT chest with contrast-  Scattered bilateral acute segmental and subsegmental pulmonary emboli of  each lung as detailed above. Small volume of contrast within the hepatic veins  indicative of a degree of right heart strain. EKG sinus tach with a rate of 117 with no acute ST-T wave changes/    Patient will be admitted for bilateral PE with right heart strain. 10 systems reviewed and negative except as noted in HPI.   Past Medical History:   Diagnosis Date    Anxiety     GERD (gastroesophageal reflux disease)     pt treats with tums- resolves per pt- sleeps 1 pillow    Hypertension     managed with meds    NSTEMI (non-ST elevated myocardial infarction) ([facility] [address] 2014    no stents- no angioplasty-- treatment with medication only    Other ill-defined conditions(799.89)     spirocytosis, high cholesterol    PUD (peptic ulcer disease)     resolved per pt/ treated    Spherocytosis ([facility] [address] [facility]     Spherocytosis ([facility] [address]  URI (upper respiratory infection) 8/14/2014      Past Surgical History:   Procedure Laterality Date    HX APPENDECTOMY      HX GI      spleen removed    HX HEART CATHETERIZATION  2014    no intervention    HX HEART CATHETERIZATION      HX HERNIA REPAIR      HX SPLENECTOMY  1987    and cholecystectomy     HX SPLENECTOMY      HX TONSILLECTOMY      HX WRIST FRACTURE TX Right 03/2017    titanium donny/screws      No Known Allergies   Social History     Tobacco Use    Smoking status: Current Every Day Smoker     Packs/day: 1.00     Years: 23.00     Pack years: 23.00    Smokeless tobacco: Former User     Types: Chew   Substance Use Topics    Alcohol use: Yes     Alcohol/week: 0.0 standard drinks     Frequency: 2-3 times a week     Drinks per session: 10 or more     Comment: 1-2 / week      Family History   Problem Relation Age of Onset    Heart Disease Mother     Hypertension Mother     Stroke Mother     Seizures Mother     Heart Disease Father     Hypertension Father     Kidney Disease Father     Stroke Maternal Grandfather     Heart Disease Paternal Grandfather     Diabetes Paternal Grandfather       Immunization History   Administered Date(s) Administered    Influenza Vaccine 10/03/2014    Influenza Vaccine PF 09/29/2015    Meningococcal (MCV4O) Vaccine 09/12/2019    Pneumococcal Conjugate (PCV-13) 08/08/2017    Pneumococcal Polysaccharide (PPSV-23) 10/10/2017    Tdap 09/12/2019     PTA Medications:  Prior to Admission Medications   Prescriptions Last Dose Informant Patient Reported? Taking? albuterol (PROVENTIL HFA, VENTOLIN HFA, PROAIR HFA) 90 mcg/actuation inhaler   No No   Sig: Take 2 Puffs by inhalation every six (6) hours as needed for Wheezing. Please dispense a spacer and use with an inhaler as directed. aspirin (ASPIRIN) 325 mg tablet   Yes No   Sig: Take 325 mg by mouth daily.  Hx of mi--- states last dose of Aspirin 325 mg ~3/1/17-- Pt to takes Aspirin 81 mg until surgery completed then revert to Aspirin 325 mg.   atorvastatin (LIPITOR) 80 mg tablet   No No   Sig: Take 1 Tab by mouth daily. clonazePAM (KLONOPIN) 1 mg tablet   No No   Sig: Take 1 Tab by mouth two (2) times a day. Max Daily Amount: 2 mg.   doxycycline (VIBRAMYCIN) 100 mg capsule   No No   Sig: Take 1 Cap by mouth two (2) times a day. fluconazole (DIFLUCAN) 150 mg tablet   No No   Sig: May repeat after 3 days if needed. umeclidinium-vilanterol (ANORO ELLIPTA) 62.5-25 mcg/actuation inhaler   No No   Sig: Take 1 Puff by inhalation daily. Facility-Administered Medications: None       Objective:     Patient Vitals for the past 24 hrs:   Temp Pulse Resp BP SpO2   02/02/20 1146  (!) 106  124/80 98 %   02/02/20 1135  (!) 119   98 %   02/02/20 1112 98.2 °F (36.8 °C) (!) 118 16 (!) 135/101 97 %     Oxygen Therapy  O2 Sat (%): 98 % (02/02/20 1146)  Pulse via Oximetry: 103 beats per minute (02/02/20 1146)  O2 Device: Room air (02/02/20 1112)  No intake or output data in the 24 hours ending 02/02/20 1519    Physical Exam:  General:    Well nourished. Alert. No respiratory distress, no chest pain. Eyes:   Normal sclera. Extraocular movements intact. ENT:  Normocephalic, atraumatic. Moist mucous membranes  CV:   Mild tachycardic no murmur, rub, or gallop. Lungs:  CTAB. No wheezing, rhonchi, or rales. Abdomen: Soft, nontender, nondistended. Bowel sounds normal.   Extremities: Warm and dry. No cyanosis or edema. Neurologic: CN II-XII grossly intact. Sensation intact. Skin:     No rashes or jaundice. Psych:  Normal mood and affect. I reviewed the labs, imaging, EKGs, telemetry, and other studies done this admission.   Data Review:   Recent Results (from the past 24 hour(s))   EKG, 12 LEAD, INITIAL    Collection Time: 02/02/20 11:08 AM   Result Value Ref Range    Ventricular Rate 117 BPM    Atrial Rate 117 BPM    P-R Interval 144 ms    QRS Duration 82 ms    Q-T Interval 310 ms    QTC Calculation (Bezet) 432 ms    Calculated P Axis 64 degrees    Calculated R Axis 97 degrees    Calculated T Axis 49 degrees    Diagnosis       Sinus tachycardia  Rightward axis  Borderline ECG  No previous ECGs available     CBC WITH AUTOMATED DIFF    Collection Time: 02/02/20 11:17 AM   Result Value Ref Range    WBC 13.8 (H) 4.3 - 11.1 K/uL    RBC 5.37 4.23 - 5.6 M/uL    HGB 18.3 (H) 13.6 - 17.2 g/dL    HCT 50.6 (H) 41.1 - 50.3 %    MCV 94.2 79.6 - 97.8 FL    MCH 34.1 (H) 26.1 - 32.9 PG    MCHC 36.2 (H) 31.4 - 35.0 g/dL    RDW 13.4 11.9 - 14.6 %    PLATELET 957 712 - 341 K/uL    MPV 8.2 (L) 9.4 - 12.3 FL    ABSOLUTE NRBC 0.00 0.0 - 0.2 K/uL    DF AUTOMATED      NEUTROPHILS 72 43 - 78 %    LYMPHOCYTES 18 13 - 44 %    MONOCYTES 8 4.0 - 12.0 %    EOSINOPHILS 1 0.5 - 7.8 %    BASOPHILS 1 0.0 - 2.0 %    IMMATURE GRANULOCYTES 0 0.0 - 5.0 %    ABS. NEUTROPHILS 9.9 (H) 1.7 - 8.2 K/UL    ABS. LYMPHOCYTES 2.5 0.5 - 4.6 K/UL    ABS. MONOCYTES 1.1 0.1 - 1.3 K/UL    ABS. EOSINOPHILS 0.2 0.0 - 0.8 K/UL    ABS. BASOPHILS 0.1 0.0 - 0.2 K/UL    ABS. IMM. GRANS. 0.1 0.0 - 0.5 K/UL   METABOLIC PANEL, COMPREHENSIVE    Collection Time: 02/02/20 11:17 AM   Result Value Ref Range    Sodium 139 136 - 145 mmol/L    Potassium 3.8 3.5 - 5.1 mmol/L    Chloride 107 98 - 107 mmol/L    CO2 25 21 - 32 mmol/L    Anion gap 7 7 - 16 mmol/L    Glucose 79 65 - 100 mg/dL    BUN 8 6 - 23 MG/DL    Creatinine 0.86 0.8 - 1.5 MG/DL    GFR est AA >60 >60 ml/min/1.73m2    GFR est non-AA >60 >60 ml/min/1.73m2    Calcium 9.4 8.3 - 10.4 MG/DL    Bilirubin, total 1.2 (H) 0.2 - 1.1 MG/DL    ALT (SGPT) 33 12 - 65 U/L    AST (SGOT) 15 15 - 37 U/L    Alk.  phosphatase 71 50 - 136 U/L    Protein, total 8.2 6.3 - 8.2 g/dL    Albumin 4.1 3.5 - 5.0 g/dL    Globulin 4.1 (H) 2.3 - 3.5 g/dL    A-G Ratio 1.0 (L) 1.2 - 3.5     MAGNESIUM    Collection Time: 02/02/20 11:17 AM   Result Value Ref Range    Magnesium 2.2 1.8 - 2.4 mg/dL   ETHYL ALCOHOL    Collection Time: 02/02/20 11:17 AM Result Value Ref Range    ALCOHOL(ETHYL),SERUM 11 MG/DL   D DIMER    Collection Time: 02/02/20 11:17 AM   Result Value Ref Range    D DIMER 8.37 (HH) <0.56 ug/ml(FEU)   PTT    Collection Time: 02/02/20 11:17 AM   Result Value Ref Range    aPTT 27.0 24.7 - 39.8 SEC   TROPONIN I    Collection Time: 02/02/20 11:45 AM   Result Value Ref Range    Troponin-I, Qt. <0.02 (L) 0.02 - 0.05 NG/ML   TROPONIN I    Collection Time: 02/02/20  1:28 PM   Result Value Ref Range    Troponin-I, Qt. <0.02 (L) 0.02 - 0.05 NG/ML       All Micro Results     None          Other Studies:  Ct Chest W Cont    Result Date: 2/2/2020  EXAM: CT Pulmonary angiogram. INDICATION: Substernal chest pain. Comparison: CTPA dated January 28, 2019. Multiple axial images were obtained through the chest during intravenous infusion of 100mL of Isovue 370. Coronal 2D MIP image reformats were performed. Radiation dose reduction techniques were used for this study: All CT scans performed at this facility use one or all of the following: Automated exposure control, adjustment of the mA and/or kVp according to patient's size, iterative reconstruction. FINDINGS: Examination is diagnostic through the segmental level. Acute pulmonary emboli within the apical, posterior, and anterior segment of the right upper lobe. Acute pulmonary emboli within the medial and lateral segments of the right middle lobe as well as pulmonary emboli of segmental level of the posterior basilar and medial basilar right lower lobe segment. Small segmental pulmonary embolism within the left apicoposterior segment and segmental pulmonary embolism within the left basal medial segment with subsegmental pulmonary emboli in the left basal lateral and basal posterior segment. There is contrast seen within the hepatic veins indicative of some degree of right heart strain. LUNGS/PLEURA: Azygos fissure is evident. Minimal mucosal secretions within the right mainstem bronchus.  No pneumothorax or pleural effusion. No evidence of pulmonary infarction. MEDIASTINUM: The heart is normal in size. No pericardial effusion. No suspicious thyroid nodule. The esophagus is normal. No pathologic lymphadenopathy. BONES/SUBCUTANEOUS TISSUE: No aggressive osseous lesion. No subcutaneous soft tissue abnormality. UPPER ABDOMEN: Limited visualization of the upper abdomen is unremarkable. IMPRESSION: 1.  Scattered bilateral acute segmental and subsegmental pulmonary emboli of each lung as detailed above. Small volume of contrast within the hepatic veins indicative of a degree of right heart strain. DC 5 These results were communicated to Dr. Dari Gray over telephone Dr. Thanh Paul at 2:18 PM on Sunday, February 02, 2020. Xr Chest Port    Result Date: 2/2/2020  EXAM: Single view chest radiograph. INDICATION: 38 years chest pain and shortness of breath. COMPARISON: Chest radiograph dated August 21, 2019. FINDINGS: No focal lung consolidation. No pneumothorax. No pleural effusion. The heart, dawit, and pulmonary vasculature are within normal limits. No evidence of acute osseous abnormality. IMPRESSION: 1. No acute cardiopulmonary abnormality.        Assessment and Plan:     Hospital Problems as of 2/2/2020 Date Reviewed: 11/15/2019          Codes Class Noted - Resolved POA    * (Principal) Pulmonary embolism ([facility] [address] ICD-10-CM: I26.99  ICD-9-CM: 415.19  2/2/2020 - Present Unknown        CAD (coronary artery disease) ICD-10-CM: I25.10  ICD-9-CM: 414.00  2/2/2020 - Present Unknown        Anxiety ICD-10-CM: F41.9  ICD-9-CM: 300.00  2/2/2020 - Present Unknown        Hereditary spherocytosis ([facility] ICD-10-CM: D58.0  ICD-9-CM: 282.0  2/2/2020 - Present Unknown        Tobacco abuse (Chronic) ICD-10-CM: Z72.0  ICD-9-CM: 305.1  8/14/2014 - Present Yes    Overview Signed 8/14/2014  3:34 PM by Sanford Cervantes PA-C     1 PPD             Dyslipidemia (Chronic) ICD-10-CM: E78.5  ICD-9-CM: 272.4  8/14/2014 - Present Yes              PLAN:  -Bilateral PE with right heart strain-ordered stat echo and bilateral lower extremity venous Doppler ordered, patient was already started on heparin drip in the ER-we will continue, bedrest with bedside commode.  -Coronary disease  -Hyperlipidemia  -COPD-PRN nebs  -Nicotine dependent, alcohol use--- advised on cessation, ordered nicotine patch 21 mg daily, PRN Ativan  -Anxiety disorder-continue Klonopin as needed  -Hereditary spherocytosis  -Noncompliance-patient says he takes on and off aspirin, every few months statin, does not need to use inhalers on a regular basis, uses Klonopin as needed. Advance life care discussed with patient in presence of wife, patient is full code.     DVT ppx: Heparin  Anticipated DC needs:    Code status:  Full  Estimated LOS:  Greater than 2 midnights  Risk:  high    Signed:  Miranda Nicole MD

## 2020-02-02 NOTE — ROUTINE PROCESS
Verbal bedside report given to [facility], oncoming RN. Patient's situation, background, assessment and recommendations provided. Opportunity for questions provided. Oncoming RN assumed care of patient.

## 2020-02-02 NOTE — ED TRIAGE NOTES
Pt ambulatory to triage 2 without complications. Pt states he had NSTEMI about 3 years ago and vessel so small not able to stent. Pt reports heavy drinking and tobacco use last night while working on car. Pt states dizziness, substernal cp that doesn't radiate since waking about 0630. Pt states he took klonopin with no relief as well a 1 NTG. Pt has hx of high cholesterol and states he is a daily smoker. Pt states he has other autoimmune disorders and no spleen. Pt states he did have episode of cold sweats earlier. Pt denies vomiting.

## 2020-02-02 NOTE — ED NOTES
TRANSFER - OUT REPORT:    Verbal report given to [facility] ([place], RN on Shala Pedraza  being transferred to  for routine progression of care       Report consisted of patients Situation, Background, Assessment and   Recommendations(SBAR). Information from the following report(s) SBAR was reviewed with the receiving nurse. Lines:   Peripheral IV 02/02/20 Right Antecubital (Active)   Site Assessment Clean, dry, & intact 2/2/2020 11:16 AM   Phlebitis Assessment 0 2/2/2020 11:16 AM   Infiltration Assessment 0 2/2/2020 11:16 AM   Dressing Status Clean, dry, & intact 2/2/2020 11:16 AM   Dressing Type Transparent 2/2/2020 11:16 AM   Hub Color/Line Status Pink 2/2/2020 11:16 AM        Opportunity for questions and clarification was provided.       Patient transported with:   Monitor  Registered Nurse

## 2020-02-02 NOTE — ROUTINE PROCESS
TRANSFER - IN REPORT: 
 
Verbal report received from [facility] on Antionette Penaloza being received from ED for routine progression of care. Report consisted of patients Situation, Background, Assessment and Recommendations(SBAR). Information from the following report(s) SBAR, Kardex, [facility] and Cardiac Rhythm NSR was reviewed. Opportunity for questions and clarification was provided. Assessment completed upon patients arrival to unit and care assumed. Patient received to room 309. Patient connected to monitor and assessment completed. Plan of care reviewed. Patient oriented to room and call light. Patient aware to use call light to communicate any chest pain or needs. Admission skin assessment completed with second RN and reveals the following: Patient's scrum and heels intact. No skin break down noted. Patient has multiple tattoos.

## 2020-02-02 NOTE — ED PROVIDER NOTES
Pt ambulatory to triage 2 without complications. Pt states he had NSTEMI about 3 years ago and vessel so small not able to stent. Pt reports heavy drinking and tobacco use last night while working on car. Pt states dizziness, substernal cp that doesn't radiate since waking about 0630. Pt states he took klonopin with no relief as well a 1 NTG. Pt has hx of high cholesterol and states he is a daily smoker. Pt states he has other autoimmune disorders and no spleen. Pt states he did have episode of cold sweats earlier. Pt denies vomiting. Onset of chest pain was approximately 11 AM.     The history is provided by the patient and medical records. Chest Pain    This is a new problem. The current episode started 1 to 2 hours ago. The problem has not changed since onset. Duration of episode(s) is 2 hours. The problem occurs rarely. The pain is associated with rest (see HPI). The pain is present in the substernal region. The pain is at a severity of 8/10. The pain is moderate. The quality of the pain is described as pressure-like. The pain does not radiate. Associated symptoms include diaphoresis, dizziness and shortness of breath. Pertinent negatives include no abdominal pain, no back pain, no claudication, no cough, no exertional chest pressure, no fever, no headaches, no hemoptysis, no irregular heartbeat, no leg pain, no lower extremity edema, no malaise/fatigue, no nausea, no near-syncope, no numbness, no orthopnea, no palpitations, no PND, no sputum production, no vomiting and no weakness. He has tried nitroglycerin and aspirin for the symptoms. The treatment provided no relief. Risk factors include cardiac disease, male gender and smoking/tobacco exposure (nSTEMI 2014). Procedural history includes cardiac catheterization.        Past Medical History:   Diagnosis Date    Anxiety     GERD (gastroesophageal reflux disease)     pt treats with tums- resolves per pt- sleeps 1 pillow    Hypertension     managed with meds  NSTEMI (non-ST elevated myocardial infarction) ([facility] [address] 8/14/2014    no stents- no angioplasty-- treatment with medication only    Other ill-defined conditions(799.89)     spirocytosis, high cholesterol    PUD (peptic ulcer disease) 2014    resolved per pt/ treated    Spherocytosis ([facility] [address]     Spherocytosis ([facility] [address]     URI (upper respiratory infection) 8/14/2014       Past Surgical History:   Procedure Laterality Date    HX APPENDECTOMY      HX GI      spleen removed    HX HEART CATHETERIZATION  2014    no intervention    HX HEART CATHETERIZATION      HX HERNIA REPAIR      HX SPLENECTOMY  1987    and cholecystectomy     HX SPLENECTOMY      HX TONSILLECTOMY      HX WRIST FRACTURE TX Right 03/2017    titanium donny/screws         Family History:   Problem Relation Age of Onset    Heart Disease Mother     Hypertension Mother     Stroke Mother     Seizures Mother     Heart Disease Father     Hypertension Father     Kidney Disease Father     Stroke Maternal Grandfather     Heart Disease Paternal Grandfather     Diabetes Paternal Grandfather        Social History     Socioeconomic History    Marital status:      Spouse name: Not on file    Number of children: Not on file    Years of education: Not on file    Highest education level: Not on file   Occupational History    Not on file   Social Needs    Financial resource strain: Not on file    Food insecurity:     Worry: Not on file     Inability: Not on file    Transportation needs:     Medical: Not on file     Non-medical: Not on file   Tobacco Use    Smoking status: Current Every Day Smoker     Packs/day: 1.00     Years: 23.00     Pack years: 23.00    Smokeless tobacco: Former User     Types: Chew   Substance and Sexual Activity    Alcohol use:  Yes     Alcohol/week: 0.0 standard drinks     Frequency: 2-3 times a week     Drinks per session: 10 or more     Comment: 1-2 / week    Drug use: Yes     Types: Prescription    Sexual activity: Yes     Partners: Female   Lifestyle    Physical activity:     Days per week: Not on file     Minutes per session: Not on file    Stress: Not on file   Relationships    Social connections:     Talks on phone: Not on file     Gets together: Not on file     Attends Holiness service: Not on file     Active member of club or organization: Not on file     Attends meetings of clubs or organizations: Not on file     Relationship status: Not on file    Intimate partner violence:     Fear of current or ex partner: Not on file     Emotionally abused: Not on file     Physically abused: Not on file     Forced sexual activity: Not on file   Other Topics Concern    Not on file   Social History Narrative    Not on file         ALLERGIES: Patient has no known allergies. Review of Systems   Constitutional: Positive for diaphoresis. Negative for fever and malaise/fatigue. Respiratory: Positive for shortness of breath. Negative for cough, hemoptysis and sputum production. Cardiovascular: Positive for chest pain. Negative for palpitations, orthopnea, claudication, PND and near-syncope. Gastrointestinal: Negative for abdominal pain, nausea and vomiting. Musculoskeletal: Negative for back pain. Neurological: Positive for dizziness. Negative for weakness, numbness and headaches. All other systems reviewed and are negative. Vitals:    02/02/20 1112 02/02/20 1135   BP: (!) 135/101    Pulse: (!) 118 (!) 119   Resp: 16    Temp: 98.2 °F (36.8 °C)    SpO2: 97% 98%   Weight: 84.8 kg (187 lb)    Height: 6' 4\" (1.93 m)             Physical Exam  Vitals signs and nursing note reviewed. Constitutional:       Appearance: He is well-developed and normal weight. HENT:      Head: Normocephalic and atraumatic. Nose: Nose normal.      Mouth/Throat:      Mouth: Mucous membranes are moist.   Eyes:      Extraocular Movements: Extraocular movements intact.       Pupils: Pupils are equal, round, and reactive to light.   Neck:      Musculoskeletal: Normal range of motion and neck supple. Cardiovascular:      Rate and Rhythm: Normal rate and regular rhythm. Pulmonary:      Effort: Pulmonary effort is normal.      Breath sounds: Normal breath sounds. Abdominal:      General: Abdomen is flat. Bowel sounds are normal.   Musculoskeletal: Normal range of motion. Skin:     General: Skin is warm and dry. Capillary Refill: Capillary refill takes less than 2 seconds. Neurological:      General: No focal deficit present. Mental Status: He is alert and oriented to person, place, and time.    Psychiatric:         Mood and Affect: Mood normal.         Behavior: Behavior normal.          MDM  Number of Diagnoses or Management Options     Amount and/or Complexity of Data Reviewed  Clinical lab tests: ordered and reviewed  Tests in the radiology section of CPT®: reviewed and ordered  Review and summarize past medical records: yes  Discuss the patient with other providers: yes  Independent visualization of images, tracings, or specimens: yes    Risk of Complications, Morbidity, and/or Mortality  Presenting problems: moderate  Diagnostic procedures: moderate  Management options: moderate    Patient Progress  Patient progress: stable         Procedures

## 2020-02-03 VITALS
WEIGHT: 196.3 LBS | BODY MASS INDEX: 23.9 KG/M2 | TEMPERATURE: 97.3 F | RESPIRATION RATE: 20 BRPM | HEART RATE: 86 BPM | DIASTOLIC BLOOD PRESSURE: 69 MMHG | SYSTOLIC BLOOD PRESSURE: 117 MMHG | HEIGHT: 76 IN | OXYGEN SATURATION: 96 %

## 2020-02-03 LAB
ANION GAP SERPL CALC-SCNC: 8 MMOL/L (ref 7–16)
APTT PPP: 58.5 SEC (ref 24.7–39.8)
APTT PPP: 91.1 SEC (ref 24.7–39.8)
ATRIAL RATE: 117 BPM
BASOPHILS # BLD: 0.2 K/UL (ref 0–0.2)
BASOPHILS NFR BLD: 1 % (ref 0–2)
BUN SERPL-MCNC: 12 MG/DL (ref 6–23)
CALCIUM SERPL-MCNC: 8.3 MG/DL (ref 8.3–10.4)
CALCULATED P AXIS, ECG09: 64 DEGREES
CALCULATED R AXIS, ECG10: 97 DEGREES
CALCULATED T AXIS, ECG11: 49 DEGREES
CHLORIDE SERPL-SCNC: 106 MMOL/L (ref 98–107)
CO2 SERPL-SCNC: 26 MMOL/L (ref 21–32)
CREAT SERPL-MCNC: 0.78 MG/DL (ref 0.8–1.5)
DIAGNOSIS, 93000: NORMAL
DIFFERENTIAL METHOD BLD: ABNORMAL
EOSINOPHIL # BLD: 0.6 K/UL (ref 0–0.8)
EOSINOPHIL NFR BLD: 4 % (ref 0.5–7.8)
ERYTHROCYTE [DISTWIDTH] IN BLOOD BY AUTOMATED COUNT: 13.6 % (ref 11.9–14.6)
GLUCOSE SERPL-MCNC: 110 MG/DL (ref 65–100)
HCT VFR BLD AUTO: 44.8 % (ref 41.1–50.3)
HGB BLD-MCNC: 15.8 G/DL (ref 13.6–17.2)
IMM GRANULOCYTES # BLD AUTO: 0 K/UL (ref 0–0.5)
IMM GRANULOCYTES NFR BLD AUTO: 0 % (ref 0–5)
LYMPHOCYTES # BLD: 5 K/UL (ref 0.5–4.6)
LYMPHOCYTES NFR BLD: 36 % (ref 13–44)
MCH RBC QN AUTO: 33.9 PG (ref 26.1–32.9)
MCHC RBC AUTO-ENTMCNC: 35.3 G/DL (ref 31.4–35)
MCV RBC AUTO: 96.1 FL (ref 79.6–97.8)
MONOCYTES # BLD: 1.2 K/UL (ref 0.1–1.3)
MONOCYTES NFR BLD: 9 % (ref 4–12)
NEUTS SEG # BLD: 6.8 K/UL (ref 1.7–8.2)
NEUTS SEG NFR BLD: 49 % (ref 43–78)
NRBC # BLD: 0 K/UL (ref 0–0.2)
P-R INTERVAL, ECG05: 144 MS
PLATELET # BLD AUTO: 378 K/UL (ref 150–450)
PMV BLD AUTO: 8.4 FL (ref 9.4–12.3)
POTASSIUM SERPL-SCNC: 3.8 MMOL/L (ref 3.5–5.1)
Q-T INTERVAL, ECG07: 310 MS
QRS DURATION, ECG06: 82 MS
QTC CALCULATION (BEZET), ECG08: 432 MS
RBC # BLD AUTO: 4.66 M/UL (ref 4.23–5.6)
SODIUM SERPL-SCNC: 140 MMOL/L (ref 136–145)
VENTRICULAR RATE, ECG03: 117 BPM
WBC # BLD AUTO: 13.9 K/UL (ref 4.3–11.1)

## 2020-02-03 PROCEDURE — 85730 THROMBOPLASTIN TIME PARTIAL: CPT

## 2020-02-03 PROCEDURE — 74011250636 HC RX REV CODE- 250/636: Performed by: FAMILY MEDICINE

## 2020-02-03 PROCEDURE — 94640 AIRWAY INHALATION TREATMENT: CPT

## 2020-02-03 PROCEDURE — 93306 TTE W/DOPPLER COMPLETE: CPT

## 2020-02-03 PROCEDURE — 94760 N-INVAS EAR/PLS OXIMETRY 1: CPT

## 2020-02-03 PROCEDURE — 74011250637 HC RX REV CODE- 250/637: Performed by: FAMILY MEDICINE

## 2020-02-03 PROCEDURE — 85025 COMPLETE CBC W/AUTO DIFF WBC: CPT

## 2020-02-03 PROCEDURE — 74011000250 HC RX REV CODE- 250: Performed by: FAMILY MEDICINE

## 2020-02-03 PROCEDURE — 36415 COLL VENOUS BLD VENIPUNCTURE: CPT

## 2020-02-03 PROCEDURE — 80048 BASIC METABOLIC PNL TOTAL CA: CPT

## 2020-02-03 RX ORDER — HEPARIN SODIUM 5000 [USP'U]/ML
35 INJECTION, SOLUTION INTRAVENOUS; SUBCUTANEOUS ONCE
Status: COMPLETED | OUTPATIENT
Start: 2020-02-03 | End: 2020-02-03

## 2020-02-03 RX ADMIN — ASPIRIN 81 MG 81 MG: 81 TABLET ORAL at 08:42

## 2020-02-03 RX ADMIN — HEPARIN SODIUM 3100 UNITS: 5000 INJECTION INTRAVENOUS; SUBCUTANEOUS at 04:43

## 2020-02-03 RX ADMIN — HEPARIN SODIUM 20 UNITS/KG/HR: 5000 INJECTION, SOLUTION INTRAVENOUS at 06:59

## 2020-02-03 RX ADMIN — Medication 100 MG: at 08:42

## 2020-02-03 RX ADMIN — ALBUTEROL SULFATE 2.5 MG: 2.5 SOLUTION RESPIRATORY (INHALATION) at 07:32

## 2020-02-03 RX ADMIN — Medication 10 ML: at 04:40

## 2020-02-03 RX ADMIN — Medication 10 ML: at 12:14

## 2020-02-03 RX ADMIN — RIVAROXABAN 15 MG: 15 TABLET, FILM COATED ORAL at 12:09

## 2020-02-03 RX ADMIN — HEPARIN SODIUM 20 UNITS/KG/HR: 5000 INJECTION, SOLUTION INTRAVENOUS at 04:44

## 2020-02-03 RX ADMIN — ATORVASTATIN CALCIUM 80 MG: 40 TABLET, FILM COATED ORAL at 08:42

## 2020-02-03 RX ADMIN — TIOTROPIUM BROMIDE 18 MCG: 18 CAPSULE ORAL; RESPIRATORY (INHALATION) at 07:36

## 2020-02-03 RX ADMIN — CLONAZEPAM 1 MG: 0.5 TABLET ORAL at 11:11

## 2020-02-03 RX ADMIN — FOLIC ACID 1 MG: 1 TABLET ORAL at 08:42

## 2020-02-03 NOTE — ROUTINE PROCESS
[address] February 3, 2020 RE: Fior Job To Whom It May Concern, This is to certify that Fior Job may return to work on 2/4/2020 for non-strenous work for 1 week. Danika Guajardo was hospitalized 2/2/2020-2/3/2020. Please feel free to contact my office if you have any questions or concerns. Thank you for your assistance in this matter. Sincerely, 
He Mederos RN  
 
[phone]

## 2020-02-03 NOTE — ROUTINE PROCESS
Verbal bedside report received from Lashae, [facility]. Assumed care of patient. Heparin IV drip verified at bedside with outgoing RN.

## 2020-02-03 NOTE — PROGRESS NOTES
Problem: Falls - Risk of  Goal: *Absence of Falls  Description  Document Donita Julio Fall Risk and appropriate interventions in the flowsheet.   Outcome: Progressing Towards Goal  Note: Fall Risk Interventions:            Medication Interventions: Evaluate medications/consider consulting pharmacy, Teach patient to arise slowly                   Problem: Pulmonary Embolism Care Plan (Adult)  Goal: *Improvement of existing pulmonary embolism  Outcome: Progressing Towards Goal  Goal: *Absence of bleeding  Outcome: Progressing Towards Goal

## 2020-02-03 NOTE — PROGRESS NOTES
Spoke to [facility], said would not recommend thrombolysis. I Will dc npo and start cardiac diet.

## 2020-02-03 NOTE — PROGRESS NOTES
Care Management Interventions  Transition of Care Consult (CM Consult): Discharge Planning  Discharge Durable Medical Equipment: No  Physical Therapy Consult: No  Occupational Therapy Consult: No  Discharge Location  Discharge Placement: Home  Chart screened by  for discharge planning. No needs identified at this time. Please consult  if any new issues arise. New medication, Xarelto, on DC summary. Pt given a 30 day supply coupon prior to discharge. No further CM  needs noted.

## 2020-02-03 NOTE — PROGRESS NOTES
Nutrition Assessment for:   Best Practice Alert for Malnutrition Screening Tool: Recently Lost Weight Without Trying: Yes, If Yes, How Much Weight Loss: 14 - 23 lbs, Eating Poorly Due to Decreased Appetite: Yes    Assessment: Patient is a 46 y.o. M admitted with PE. PMH of hereditary spherocytosis, splenectomy, NSTEMI, CAD, HLD, HTN, and COPD. Patient reports trend of weight loss over past 2 years, with UBW of 220#. Patient reports that he usually only eats one meal (dinner) per day due to decreased appetite and busy work schedule. Usual meals consist of a slice of pizza, [place] food, smoked pork, and chicken. States that if he eats during the day it is usually \"junk food. \" Expresses concern with h/o gallstones and avoidance of spicy foods. Patient had not yet received breakfast at time of visit, however, he stated that he was not hungry. Per EMR, trace edema noted in BLE. Weights cannot be verified as source is unknown. Weight Loss Metrics 2/3/2020 11/15/2019 8/16/2019 1/28/2019   Today's Wt 196 lb 4.8 oz 186 lb 191 lb 189 lb   BMI 23.89 kg/m2 23.25 kg/m2 23.87 kg/m2 23.62 kg/m2     Weight Loss Metrics 1/16/2018   Today's Wt 218 lb   BMI 27.25 kg/m2   Weight gain of 10# noted from 11/19-2/20. Weight loss of 10% from 1/18-2/20. Not clinically significant. DIET CARDIAC Regular      Anthropometrics:  Height: 6' 4\" (193 cm), Weight: 89 kg (196 lb 4.8 oz), Weight Source: Standing scale (comment), Body mass index is 23.89 kg/m². BMI class of Normal weight. Macronutrient needs: (using CBW (Current body weight) standing scale 89 kg)  EER: 3124-5874 kcal/day 25-30 kcal/kg  EPR: 71-89 g/day 0.8-1.0 g/kg    Intake/Comparative Standards: Insufficient intake data. Unable to assess if needs are met. Defer assessment until more data available.       Nutrition Diagnosis:  Inadequate oral intake related to decreased ability to consume sufficient energy and food and nutrition related knowledge deficit concerning sufficient oral food/beverage intake as evidenced by undesirable food choices (high fat), frequent meal skipping, change in appetite and weight loss (22# in 2 yrs). Nutrition Intervention:  Meals and snacks: Continue current diet. Medical food supplement therapy: Offered ONS, patient refused. Nutrition education: Dietetic intern discussed importance of regular meal consumption for weight maintenance. Discussed nutrient dense snacks and meals to pack for work and strategies to promote intake. Provided education about a low-fat diet for gallstone prevention and cardiovascular protection. Provided SafetyCulture Cut the Fat and High-Protein High-Calorie Tips. Discharge Nutrition Plan:  Recommend patient continue nutrition behavior change therapy in outpatient setting. While it is good to continue losing weight for your joints and mobility, losing weight without trying often means you are losing muscle (instead of fat), which could lead to decrease in immunity, higher risk for falls, infections and hospitalizations. Contact [phone] for more information or have your doctor send a Nutrition Counseling referral to [address].     Reny Ballard, Dietetic Intern

## 2020-02-03 NOTE — DISCHARGE SUMMARY
Hospitalist Discharge Summary     Admit Date:  2020 11:23 AM   Name:  Colleen Coles   Age:  46 y.o.  :  1980   MRN:  [id number]   PCP:  Ana Lilia Mercer MD  Treatment Team: Attending Provider: Renan Minor MD; Care Manager: Iesha Richardson RN; Utilization Review: Vanesa Sun RN; Primary Nurse: Raven Kenyon RN    Problem List for this Hospitalization:  Hospital Problems as of 2/3/2020 Date Reviewed: 11/15/2019          Codes Class Noted - Resolved POA    * (Principal) Pulmonary embolism ([facility] ICD-10-CM: I26.99  ICD-9-CM: 415.19  2020 - Present Unknown        CAD (coronary artery disease) ICD-10-CM: I25.10  ICD-9-CM: 414.00  2020 - Present Unknown        Anxiety ICD-10-CM: F41.9  ICD-9-CM: 300.00  2020 - Present Unknown        Hereditary spherocytosis ([facility] ICD-10-CM: D58.0  ICD-9-CM: 282.0  2020 - Present Unknown        Noncompliance ICD-10-CM: Z91.19  ICD-9-CM: V15.81  2020 - Present Unknown        Tobacco abuse (Chronic) ICD-10-CM: Z72.0  ICD-9-CM: 305.1  2014 - Present Yes    Overview Signed 2014  3:34 PM by Gurinder Díaz PA-C     1 PPD             Dyslipidemia (Chronic) ICD-10-CM: E78.5  ICD-9-CM: 272.4  2014 - Present Yes                Admission HPI from 2020:    43-year-old  male patient with a known history of hereditary Spherocytosis with a splenectomy, coronary disease with history of cardiac cath in , hyperlipidemia, anxiety and COPD. No nicotine abuse and alcohol use.     Patient woke up this morning with the bilateral legs cramps, started having shortness of breath and later on midsternal chest pain. Says shortness of breath or walking short distance.   Brought to the emergency room, patient was given Toradol and Compazine, at the time of my examination, no chest pain no shortness of breath.     Patient otherwise did not complain of any headache or any dizziness or any hemoptysis or any hematemesis or new leg swelling.     D-dimer of 8.37.  CT chest with contrast-  Scattered bilateral acute segmental and subsegmental pulmonary emboli of  each lung as detailed above. Small volume of contrast within the hepatic veins  indicative of a degree of right heart strain. EKG sinus tach with a rate of 117 with no acute ST-T wave changes/     Patient will be admitted for bilateral PE with right heart strain. Hospital Course:  Pt was admitted, kept bedrest but still started walking around in the room with any respiratory distress. Didnt require any oxygen. No respiratory distress since admission. Bilateral venous doppler negative for dvt. Spoke to IR doctor- did not recommend thrombolysis. Echo normal- did not show any heart strain. Pt was initially continued on heparin, later on discussing with pt and wife, explaining them the pro and cons of all the anticoagulation medications, decided on taking xarelto. Pt was started on xarelto, explained the importance of being compliant with medication and to be taken with diet. Advised to f/u with hematology through pcp. Pt is clinically stable for discharge. Follow up instructions below. Plan was discussed with pt and wife. All questions answered. Patient was stable at time of discharge and was instructed to call or return if there are any concerns or recurrence of symptoms. Diagnostic Imaging/Tests:   Ct Chest W Cont    Result Date: 2/2/2020  EXAM: CT Pulmonary angiogram. INDICATION: Substernal chest pain. Comparison: CTPA dated January 28, 2019. Multiple axial images were obtained through the chest during intravenous infusion of 100mL of Isovue 370. Coronal 2D MIP image reformats were performed. Radiation dose reduction techniques were used for this study: All CT scans performed at this facility use one or all of the following: Automated exposure control, adjustment of the mA and/or kVp according to patient's size, iterative reconstruction.  FINDINGS: Examination is diagnostic through the segmental level. Acute pulmonary emboli within the apical, posterior, and anterior segment of the right upper lobe. Acute pulmonary emboli within the medial and lateral segments of the right middle lobe as well as pulmonary emboli of segmental level of the posterior basilar and medial basilar right lower lobe segment. Small segmental pulmonary embolism within the left apicoposterior segment and segmental pulmonary embolism within the left basal medial segment with subsegmental pulmonary emboli in the left basal lateral and basal posterior segment. There is contrast seen within the hepatic veins indicative of some degree of right heart strain. LUNGS/PLEURA: Azygos fissure is evident. Minimal mucosal secretions within the right mainstem bronchus. No pneumothorax or pleural effusion. No evidence of pulmonary infarction. MEDIASTINUM: The heart is normal in size. No pericardial effusion. No suspicious thyroid nodule. The esophagus is normal. No pathologic lymphadenopathy. BONES/SUBCUTANEOUS TISSUE: No aggressive osseous lesion. No subcutaneous soft tissue abnormality. UPPER ABDOMEN: Limited visualization of the upper abdomen is unremarkable. IMPRESSION: 1.  Scattered bilateral acute segmental and subsegmental pulmonary emboli of each lung as detailed above. Small volume of contrast within the hepatic veins indicative of a degree of right heart strain. DC 5 These results were communicated to Dr. Jesus Bruno over telephone Dr. Mario Lima at 2:18 PM on Sunday, February 02, 2020. Xr Chest Port    Result Date: 2/2/2020  EXAM: Single view chest radiograph. INDICATION: 39 years chest pain and shortness of breath. COMPARISON: Chest radiograph dated August 21, 2019. FINDINGS: No focal lung consolidation. No pneumothorax. No pleural effusion. The heart, dawit, and pulmonary vasculature are within normal limits. No evidence of acute osseous abnormality. IMPRESSION: 1. No acute cardiopulmonary abnormality. Duplex Lower Ext Venous Bilat    Result Date: 2020  EXAM: Bilateral  lower extremity venous ultrasound. INDICATION:  Bilateral extremity pain. Comparison: None. Doppler ultrasound of both lower extremities was performed. FINDINGS: There is normal flow in the common femoral, superficial femoral, and popliteal veins. Normal compression and augmentation is demonstrated. The proximal calf veins are also patent. IMPRESSION: 1. No evidence of deep venous thrombosis in either lower extremity. Echocardiogram results:  Results for orders placed or performed during the hospital encounter of 20   2D ECHO COMPLETE ADULT (TTE) W OR [address] [facility]  [address] [facility], [address] [facility]  [phone]    Transthoracic Echocardiogram  2D, M-mode, Doppler, and Color Doppler    Patient: Jhoyn Edmond  MR #: [id number]  : 1980  Age: 46 years  Gender: Male  Study date: 2020  Account #: [de-identified]  Height: 76 in  Weight: 195.6 lb  BSA: 2.2 mï¾²  Status:Routine  Location: [facility]  BP: 113/ 71    Allergies: NO KNOWN ALLERGENS    Sonographer:  Violet Dhaliwal RDCS  Group:  [facility]  Referring Physician:  Demian Valdez MD  Reading Physician:  Isaiah Hi MD [facility]    INDICATIONS: Bilateral PE- Right heart strain. PROCEDURE: This was a routine study. A transthoracic echocardiogram was  performed. The study included complete 2D imaging, M-mode, complete spectral  Doppler, and color Doppler. Image quality was adequate. LEFT VENTRICLE: Size was normal. Systolic function was normal. Ejection  fraction was estimated in the range of 55 % to 60 %. There were no regional  wall motion abnormalities. Wall thickness was normal. The E/e' ratio was 5.8.   Left ventricular diastolic function parameters were normal.    RIGHT VENTRICLE: The size was normal. Systolic function was normal. The  tricuspid jet envelope definition was inadequate for estimation of RV   systolic  pressure. LEFT ATRIUM: Size was normal.    RIGHT ATRIUM: Size was normal.    SYSTEMIC VEINS: IVC: The inferior vena cava was mildly dilated. The  respirophasic change in diameter was more than 50%. AORTIC VALVE: The valve was probably trileaflet. There was no evidence for  stenosis. There was no insufficiency. MITRAL VALVE: Valve structure was normal. There was no evidence for stenosis. There was no regurgitation. TRICUSPID VALVE: The valve structure was normal. There was no evidence for  stenosis. There was no regurgitation. PULMONIC VALVE: Not well visualized. There was no evidence for stenosis. There  was no insufficiency. PERICARDIUM: There was no pericardial effusion. AORTA: The root exhibited normal size. SUMMARY:    -  Left ventricle: Systolic function was normal. Ejection fraction was  estimated in the range of 55 % to 60 %. There were no regional wall motion  abnormalities. -  Inferior vena cava, hepatic veins: The inferior vena cava was mildly  dilated. The respirophasic change in diameter was more than 50%. SYSTEM MEASUREMENT TABLES    2D mode  AoR Diam (2D): 3.2 cm  LA Dimension (2D): 3.2 cm  Left Atrium Systolic Volume Index; Method of Disks, Biplane; 2D mode;: 33.1  ml/m2  IVS/LVPW (2D): 0.8  IVSd (2D): 0.9 cm  LVIDd (2D): 4.7 cm  LVIDs (2D): 3.5 cm  LVOT Area (2D): 2.8 cm2  LVPWd (2D): 1.1 cm  RVIDd (2D): 3.2 cm    Tissue Doppler Imaging  LV Peak Early Meredith Tissue Han; Lateral MA (TDI): 15.7 cm/s  LV Peak Early Meredith Tissue Han; Medial MA (TDI): 11.1 cm/s    Unspecified Scan Mode  Peak Grad; Mean; Antegrade Flow: 7 mm[Hg]  Vmax;  Antegrade Flow: 136 cm/s  LVOT Diam: 1.9 cm  MV Peak Han/LV Peak Tissue Han E-Wave; Lateral MA: 4.8  MV Peak Han/LV Peak Tissue Han E-Wave; Medial MA: 6.8    Prepared and signed by    Neelam Dawkins MD [facility]  Signed 03-Feb-2020 12:54:36           All Micro Results     None          Labs: Results:       BMP, Mg, Phos Recent Labs     02/03/20  0322 02/02/20  1117    139   K 3.8 3.8    107   CO2 26 25   AGAP 8 7   BUN 12 8   CREA 0.78* 0.86   CA 8.3 9.4   * 79   MG  --  2.2      CBC Recent Labs     02/03/20  0322 02/02/20  1117   WBC 13.9* 13.8*   RBC 4.66 5.37   HGB 15.8 18.3*   HCT 44.8 50.6*    406   GRANS 49 72   LYMPH 36 18   EOS 4 1   MONOS 9 8   BASOS 1 1   IG 0 0   ANEU 6.8 9.9*   ABL 5.0* 2.5   LAVERNE 0.6 0.2   ABM 1.2 1.1   ABB 0.2 0.1   AIG 0.0 0.1      LFT Recent Labs     02/02/20  1117   SGOT 15   ALT 33   AP 71   TP 8.2   ALB 4.1   GLOB 4.1*   AGRAT 1.0*      Cardiac Testing Lab Results   Component Value Date/Time    Troponin-I, Qt. <0.02 (L) 02/02/2020 01:28 PM    Troponin-I, Qt. <0.02 (L) 02/02/2020 11:45 AM    Troponin-I, Qt. 13.90 (HH) 08/15/2014 05:35 AM      Coagulation Tests Lab Results   Component Value Date/Time    aPTT 91.1 (H) 02/03/2020 10:25 AM    aPTT 58.5 (H) 02/03/2020 03:22 AM    aPTT 83.2 (H) 02/02/2020 08:51 PM      A1c Lab Results   Component Value Date/Time    Hemoglobin A1c 4.8 08/16/2019 10:54 AM      Lipid Panel Lab Results   Component Value Date/Time    Cholesterol, total 214 (H) 08/16/2019 10:54 AM    HDL Cholesterol 45 08/16/2019 10:54 AM    LDL, calculated 150 (H) 08/16/2019 10:54 AM    VLDL, calculated 19 08/16/2019 10:54 AM    Triglyceride 94 08/16/2019 10:54 AM    CHOL/HDL Ratio 5.0 10/28/2016 09:00 AM      Thyroid Panel Lab Results   Component Value Date/Time    TSH 1.210 08/16/2019 10:54 AM        Most Recent UA No results found for: COLOR, APPRN, REFSG, MARGARET, PROTU, GLUCU, KETU, BILU, BLDU, UROU, SAVANAH, LEUKU     No Known Allergies  Immunization History   Administered Date(s) Administered    Influenza Vaccine 10/03/2014    Influenza Vaccine PF 09/29/2015    Meningococcal (MCV4O) Vaccine 09/12/2019    Pneumococcal Conjugate (PCV-13) 08/08/2017    Pneumococcal Polysaccharide (PPSV-23) 10/10/2017    Tdap 09/12/2019       All Labs from Last 24 Hrs:  Recent Results (from the past 24 hour(s))   TROPONIN I    Collection Time: 02/02/20  1:28 PM   Result Value Ref Range    Troponin-I, Qt. <0.02 (L) 0.02 - 0.05 NG/ML   PTT    Collection Time: 02/02/20  8:51 PM   Result Value Ref Range    aPTT 83.2 (H) 24.7 - 17.5 SEC   METABOLIC PANEL, BASIC    Collection Time: 02/03/20  3:22 AM   Result Value Ref Range    Sodium 140 136 - 145 mmol/L    Potassium 3.8 3.5 - 5.1 mmol/L    Chloride 106 98 - 107 mmol/L    CO2 26 21 - 32 mmol/L    Anion gap 8 7 - 16 mmol/L    Glucose 110 (H) 65 - 100 mg/dL    BUN 12 6 - 23 MG/DL    Creatinine 0.78 (L) 0.8 - 1.5 MG/DL    GFR est AA >60 >60 ml/min/1.73m2    GFR est non-AA >60 >60 ml/min/1.73m2    Calcium 8.3 8.3 - 10.4 MG/DL   CBC WITH AUTOMATED DIFF    Collection Time: 02/03/20  3:22 AM   Result Value Ref Range    WBC 13.9 (H) 4.3 - 11.1 K/uL    RBC 4.66 4.23 - 5.6 M/uL    HGB 15.8 13.6 - 17.2 g/dL    HCT 44.8 41.1 - 50.3 %    MCV 96.1 79.6 - 97.8 FL    MCH 33.9 (H) 26.1 - 32.9 PG    MCHC 35.3 (H) 31.4 - 35.0 g/dL    RDW 13.6 11.9 - 14.6 %    PLATELET 793 842 - 174 K/uL    MPV 8.4 (L) 9.4 - 12.3 FL    ABSOLUTE NRBC 0.00 0.0 - 0.2 K/uL    DF AUTOMATED      NEUTROPHILS 49 43 - 78 %    LYMPHOCYTES 36 13 - 44 %    MONOCYTES 9 4.0 - 12.0 %    EOSINOPHILS 4 0.5 - 7.8 %    BASOPHILS 1 0.0 - 2.0 %    IMMATURE GRANULOCYTES 0 0.0 - 5.0 %    ABS. NEUTROPHILS 6.8 1.7 - 8.2 K/UL    ABS. LYMPHOCYTES 5.0 (H) 0.5 - 4.6 K/UL    ABS. MONOCYTES 1.2 0.1 - 1.3 K/UL    ABS. EOSINOPHILS 0.6 0.0 - 0.8 K/UL    ABS. BASOPHILS 0.2 0.0 - 0.2 K/UL    ABS. IMM.  GRANS. 0.0 0.0 - 0.5 K/UL   PTT    Collection Time: 02/03/20  3:22 AM   Result Value Ref Range    aPTT 58.5 (H) 24.7 - 39.8 SEC   PTT    Collection Time: 02/03/20 10:25 AM   Result Value Ref Range    aPTT 91.1 (H) 24.7 - 39.8 SEC       Discharge Exam:  Patient Vitals for the past 24 hrs:   Temp Pulse Resp BP SpO2   02/03/20 1016 97.3 °F (36.3 °C) 86 20 117/69 96 %   02/03/20 0736     96 % 02/03/20 0437 97.6 °F (36.4 °C) 71 16 113/71 97 %   02/03/20 0022 97.4 °F (36.3 °C) 80 16 107/69 94 %   02/02/20 2056 97.9 °F (36.6 °C) 98 18 116/72 93 %   02/02/20 2009     97 %   02/02/20 1550 98.9 °F (37.2 °C) 74 18 127/85 98 %   02/02/20 1524  75  124/76 96 %     Oxygen Therapy  O2 Sat (%): 96 % (02/03/20 1016)  Pulse via Oximetry: 75 beats per minute (02/03/20 0736)  O2 Device: Room air (02/03/20 1131)    Intake/Output Summary (Last 24 hours) at 2/3/2020 1315  Last data filed at 2/3/2020 1030  Gross per 24 hour   Intake 580 ml   Output    Net 580 ml       General:    Well nourished. Alert. No distress. Eyes:   Normal sclera. Extraocular movements intact. ENT:  Normocephalic, atraumatic. Moist mucous membranes  CV:   Regular rate and rhythm. No murmur, rub, or gallop. Lungs:  Clear to auscultation bilaterally. No wheezing, rhonchi, or rales. Abdomen: Soft, nontender, nondistended. Bowel sounds normal.   Extremities: Warm and dry. No cyanosis or edema. Neurologic: CN II-XII grossly intact. Sensation intact. Skin:     No rashes or jaundice. Psych:  Normal mood and affect. Discharge Info:   Current Discharge Medication List      START taking these medications    Details   !! rivaroxaban (XARELTO) 15 mg tab tablet Take 1 Tab by mouth two (2) times daily (with meals) for 21 days. Qty: 42 Tab, Refills: 0      !! rivaroxaban (XARELTO) 20 mg tab tablet Take 1 Tab by mouth daily (with dinner). Qty: 30 Tab, Refills: 0       !! - Potential duplicate medications found. Please discuss with provider. CONTINUE these medications which have NOT CHANGED    Details   citalopram (CELEXA) 20 mg tablet Take 20 mg by mouth daily. clonazePAM (KLONOPIN) 1 mg tablet Take 1 Tab by mouth two (2) times a day. Max Daily Amount: 2 mg.   Qty: 20 Tab, Refills: 1    Associated Diagnoses: Situational anxiety      albuterol (PROVENTIL HFA, VENTOLIN HFA, PROAIR HFA) 90 mcg/actuation inhaler Take 2 Puffs by inhalation every six (6) hours as needed for Wheezing. Please dispense a spacer and use with an inhaler as directed. Qty: 1 Inhaler, Refills: 5      atorvastatin (LIPITOR) 80 mg tablet Take 1 Tab by mouth daily. Qty: 90 Tab, Refills: 3    Associated Diagnoses: Dyslipidemia      umeclidinium-vilanterol (ANORO ELLIPTA) 62.5-25 mcg/actuation inhaler Take 1 Puff by inhalation daily. Qty: 1 Inhaler, Refills: 11         STOP taking these medications       aspirin 81 mg chewable tablet Comments:   Reason for Stopping:                 Disposition: Home   Activity: No strenuous activity. Diet: DIET CARDIAC Regular    Follow-up Appointments   Procedures    FOLLOW UP VISIT Appointment in: One Week F/u with pcp in a week with cbc and bmp. Speak to pcp about present admission and review medications and refills if any required. Advised about xarelto, importance of being complaint with medication and omkar. .. F/u with pcp in a week with cbc and bmp. Speak to pcp about present admission and review medications and refills if any required. Advised about xarelto, importance of being complaint with medication and taking it with diet. If any bleeding, hold xarelto and call pcp or come to ER. Advised on smoking and alcohol cessation. No strenuous work for a weak. On f/u with pcp discuss about clearing pt for work and recommendations at work. Standing Status:   Standing     Number of Occurrences:   1     Order Specific Question:   Appointment in     Answer: One Week         Follow-up Information    None         Time spent in patient discharge planning and coordination 25 minutes.     Signed:  Yohana Ramirez MD

## 2020-02-03 NOTE — PROGRESS NOTES
Problem: Falls - Risk of  Goal: *Absence of Falls  Description  Document Redgie Curet Fall Risk and appropriate interventions in the flowsheet.   Outcome: Progressing Towards Goal  Note: Fall Risk Interventions:            Medication Interventions: Patient to call before getting OOB, Teach patient to arise slowly                   Problem: Patient Education: Go to Patient Education Activity  Goal: Patient/Family Education  Outcome: Progressing Towards Goal

## 2020-02-03 NOTE — PROGRESS NOTES
Spiritual Care Visit, initial visit. Visited with patient at bedside. Affirmed his illness and his progress. Prayed for patient's healing and health. Visit by Nora Cortes, Staff .  Nicole, Rafaela.B., B.A.

## 2020-02-03 NOTE — ROUTINE PROCESS
Bedside and verbal shift report received from [facility], [facility], [facility]

## 2020-02-03 NOTE — DISCHARGE INSTRUCTIONS
Speak to pcp about present admission and review medications and refills if any required. No strenuous work for a week. On f/u with pcp discuss about clearing pt for work and recommendations at work. F/u with hematology through is primary care physician. Pulmonary Embolism: Care Instructions  Your Care Instructions    Pulmonary embolism is the sudden blockage of an artery in the lung. Blood clots in the deep veins of the leg or pelvis (deep vein thrombosis, or DVT) are the most common cause. These blood clots can travel to the lungs. Pulmonary embolism can be very serious. Because you have had one pulmonary embolism, you are at greater risk for having another one. But you can take steps to prevent another pulmonary embolism by following your doctor's instructions. You will probably take a prescription blood-thinning medicine to prevent blood clots. A blood thinner can stop a blood clot from growing larger and prevent new clots from forming. Follow-up care is a key part of your treatment and safety. Be sure to make and go to all appointments, and call your doctor if you are having problems. It's also a good idea to know your test results and keep a list of the medicines you take. How can you care for yourself at home? · Take your medicines exactly as prescribed. Call your doctor if you think you are having a problem with your medicine. You will get more details on the specific medicines your doctor prescribes. · If you are taking a blood thinner, be sure you get instructions about how to take your medicine safely. Blood thinners can cause serious bleeding problems. Preventing future pulmonary embolisms  · Exercise. Keep blood moving in your legs to keep clots from forming. If you are traveling by car, stop every hour or so. Get out and walk around for a few minutes. If you are traveling by bus, train, or plane, get out of your seat and walk up and down the aisles every hour or so.  You also can do leg exercises while you are seated. Pump your feet up and down by pulling your toes up toward your knees then pointing them down. · Get up out of bed as soon as possible after an illness or surgery. · Do not smoke. If you need help quitting, talk to your doctor about stop-smoking programs and medicines. These can increase your chances of quitting for good. · Check with your doctor before taking hormone or birth control pills. These may increase your risk of blot clots. · Ask your doctor about wearing compression stockings to help prevent blood clots in your legs. There are different types of stockings, and they need to fit right. So your doctor will recommend what you need. When should you call for help? Call 911 anytime you think you may need emergency care. For example, call if:    · You have shortness of breath.     · You have chest pain.     · You passed out (lost consciousness).     · You cough up blood.    Call your doctor now or seek immediate medical care if:    · You have new or worsening pain or swelling in your leg.    Watch closely for changes in your health, and be sure to contact your doctor if:    · You do not get better as expected. Where can you learn more? Go to http://ju-caitlyn.info/. Enter Z385 in the search box to learn more about \"Pulmonary Embolism: Care Instructions. \"  Current as of: September 26, 2018  Content Version: 12.2  © 7900-5364 [employer]. Care instructions adapted under license by [employer] (which disclaims liability or warranty for this information). If you have questions about a medical condition or this instruction, always ask your healthcare professional. [place] [address] any warranty or liability for your use of this information. Stopping Smoking: Care Instructions  Your Care Instructions  Cigarette smokers crave the nicotine in cigarettes. Giving it up is much harder than simply changing a habit. Your body has to stop craving the nicotine. It is hard to quit, but you can do it. There are many tools that people use to quit smoking. You may find that combining tools works best for you. There are several steps to quitting. First you get ready to quit. Then you get support to help you. After that, you learn new skills and behaviors to become a nonsmoker. For many people, a necessary step is getting and using medicine. Your doctor will help you set up the plan that best meets your needs. You may want to attend a smoking cessation program to help you quit smoking. When you choose a program, look for one that has proven success. Ask your doctor for ideas. You will greatly increase your chances of success if you take medicine as well as get counseling or join a cessation program.  Some of the changes you feel when you first quit tobacco are uncomfortable. Your body will miss the nicotine at first, and you may feel short-tempered and grumpy. You may have trouble sleeping or concentrating. Medicine can help you deal with these symptoms. You may struggle with changing your smoking habits and rituals. The last step is the tricky one: Be prepared for the smoking urge to continue for a time. This is a lot to deal with, but keep at it. You will feel better. Follow-up care is a key part of your treatment and safety. Be sure to make and go to all appointments, and call your doctor if you are having problems. It's also a good idea to know your test results and keep a list of the medicines you take. How can you care for yourself at home? · Ask your family, friends, and coworkers for support. You have a better chance of quitting if you have help and support. · Join a support group, such as Nicotine Anonymous, for people who are trying to quit smoking. · Consider signing up for a smoking cessation program, such as the American Lung Association's Freedom from Smoking program.  · Get text messaging support.  Go to the website at www.smokefree. gov to sign up for the [facility] program.  · Set a quit date. Pick your date carefully so that it is not right in the middle of a big deadline or stressful time. Once you quit, do not even take a puff. Get rid of all ashtrays and lighters after your last cigarette. Clean your house and your clothes so that they do not smell of smoke. · Learn how to be a nonsmoker. Think about ways you can avoid those things that make you reach for a cigarette. ? Avoid situations that put you at greatest risk for smoking. For some people, it is hard to have a drink with friends without smoking. For others, they might skip a coffee break with coworkers who smoke. ? Change your daily routine. Take a different route to work or eat a meal in a different place. · Cut down on stress. Calm yourself or release tension by doing an activity you enjoy, such as reading a book, taking a hot bath, or gardening. · Talk to your doctor or pharmacist about nicotine replacement therapy, which replaces the nicotine in your body. You still get nicotine but you do not use tobacco. Nicotine replacement products help you slowly reduce the amount of nicotine you need. These products come in several forms, many of them available over-the-counter:  ? Nicotine patches  ? Nicotine gum and lozenges  ? Nicotine inhaler  · Ask your doctor about bupropion (Wellbutrin) or varenicline (Chantix), which are prescription medicines. They do not contain nicotine. They help you by reducing withdrawal symptoms, such as stress and anxiety. · Some people find hypnosis, acupuncture, and massage helpful for ending the smoking habit. · Eat a healthy diet and get regular exercise. Having healthy habits will help your body move past its craving for nicotine. · Be prepared to keep trying. Most people are not successful the first few times they try to quit. Do not get mad at yourself if you smoke again.  Make a list of things you learned and think about when you want to try again, such as next week, next month, or next year. Where can you learn more? Go to http://ju-caitlyn.info/. Enter L138 in the search box to learn more about \"Stopping Smoking: Care Instructions. \"  Current as of: September 26, 2018  Content Version: 12.2  © 1921-5439 [employer]. Care instructions adapted under license by [employer] (which disclaims liability or warranty for this information). If you have questions about a medical condition or this instruction, always ask your healthcare professional. [address] any warranty or liability for your use of this information. Rivaroxaban (By mouth)   Rivaroxaban ([id number]-r-[id number]-a-ban)  Treats and prevents blood clots, which lowers the risk of stroke, deep vein thrombosis (DVT), pulmonary embolism (PE), and similar conditions. This medicine is a blood thinner. Brand Name(s): Xarelto, Xarelto Starter Pack   There may be other brand names for this medicine. When This Medicine Should Not Be Used: This medicine is not right for everyone. Do not use it if you had an allergic reaction to rivaroxaban, or you have severe bleeding. How to Use This Medicine:   Tablet  · Take this medicine as directed, and take it at the same time each day. · 10-milligram (mg) tablet: Take with or without food. · 15-mg or 20-mg tablet: Take with food. · If you cannot swallow the tablets, you may crush the tablet and mix it with applesauce. Eat some food after you swallow the mixture. · Tube feeding: You may crush the tablet and mix the medicine in 50 milliliters (mL) of water before giving it via the tube. This must be followed by a feeding. · This medicine should come with a Medication Guide. Ask your pharmacist for a copy if you do not have one. · Missed dose:   ¨ Ask your doctor or pharmacist if you are not sure what to do if you miss a dose. ¨ Once-daily dose:  If you miss a dose or forget to use your medicine, use it as soon as you can on the same day. Do not use extra medicine to make up for a missed dose. ¨ Twice-daily dose to treat a blood clot (15-mg tablet): If you miss a dose or forget to use your medicine, use it as soon as you can on the same day. You may take 2 doses at the same time to make up for the missed dose. This is only for people who take a total of 30 mg per day. · Store the medicine in a closed container at room temperature, away from heat, moisture, and direct light. Drugs and Foods to Avoid:   Ask your doctor or pharmacist before using any other medicine, including over-the-counter medicines, vitamins, and herbal products. · Some foods and medicines can affect how rivaroxaban works. Tell your doctor if you are using any of the following:  ¨ NSAID medicine (including aspirin, celecoxib, diclofenac, ibuprofen, naproxen)  ¨ Ketoconazole, itraconazole, lopinavir, ritonavir, indinavir, conivaptan, carbamazepine, phenytoin, rifampin, Linwood's wort  ¨ Another blood thinner (including clopidogrel, enoxaparin, heparin, warfarin)  Warnings While Using This Medicine:   · Tell your doctor if you are pregnant or breastfeeding, or if you have kidney disease, liver disease, bleeding problems, or an artificial heart valve. · This medicine may increase your risk of bleeding. Be careful to avoid injuries that could cause bleeding. Stay away from rough sports or other situations where you could be bruised, cut, or hurt. Brush and floss your teeth gently. Be careful when using sharp objects, including razors and fingernail clippers. Avoid picking your nose. If you need to blow your nose, blow it gently. · This medicine may cause nerve damage if you have a medical procedure done to your back, including anesthesia or a spinal puncture. This is more likely to happen if you have a history of back injury, back surgery, problems with your spine, or procedures or punctures to your back.  Tell your doctor if you are also taking another blood thinner, because this also increases the risk. · Do not stop using this medicine suddenly without asking your doctor. You might have a higher risk of stroke for a short time after you stop using this medicine. · Tell any doctor or dentist who treats you that you are using this medicine. · Your doctor will do lab tests at regular visits to check on the effects of this medicine. Keep all appointments. · Keep all medicine out of the reach of children. Never share your medicine with anyone. Possible Side Effects While Using This Medicine:   Call your doctor right away if you notice any of these side effects:  · Allergic reaction: Itching or hives, swelling in your face or hands, swelling or tingling in your mouth or throat, chest tightness, trouble breathing  · Blistering, peeling, or red skin rash  · Decrease in how much or how often you urinate  · Heavy menstrual bleeding, or vaginal bleeding  · Red or brown urine, bloody or black, tarry stools  · Unusual bleeding or bruising, including frequent nosebleeds  · Vomiting blood or material that looks like coffee grounds  If you notice other side effects that you think are caused by this medicine, tell your doctor. Call your doctor for medical advice about side effects. You may report side effects to FDA at 2-553-FDA-3136  © 2017 [facility] Information is for End User's use only and may not be sold, redistributed or otherwise used for commercial purposes. The above information is an  only. It is not intended as medical advice for individual conditions or treatments. Talk to your doctor, nurse or pharmacist before following any medical regimen to see if it is safe and effective for you.   DISCHARGE SUMMARY from Nurse    PATIENT INSTRUCTIONS:    After general anesthesia or intravenous sedation, for 24 hours or while taking prescription Narcotics:  · Limit your activities  · Do not drive and operate hazardous machinery  · Do not make important personal or business decisions  · Do  not drink alcoholic beverages  · If you have not urinated within 8 hours after discharge, please contact your surgeon on call. Report the following to your surgeon:  · Excessive pain, swelling, redness or odor of or around the surgical area  · Temperature over 100.5  · Nausea and vomiting lasting longer than 4 hours or if unable to take medications  · Any signs of decreased circulation or nerve impairment to extremity: change in color, persistent  numbness, tingling, coldness or increase pain  · Any questions    What to do at Home:  Recommended activity: Activity as tolerated and no driving for today and no lifting,or Strenuous exercise for 1 week or until cleared by PCP. If you experience any of the following symptoms shortness of breath, chest pain, please follow up with PCP. *  Please give a list of your current medications to your Primary Care Provider. *  Please update this list whenever your medications are discontinued, doses are      changed, or new medications (including over-the-counter products) are added. *  Please carry medication information at all times in case of emergency situations. These are general instructions for a healthy lifestyle:    No smoking/ No tobacco products/ Avoid exposure to second hand smoke  Surgeon General's Warning:  Quitting smoking now greatly reduces serious risk to your health. Obesity, smoking, and sedentary lifestyle greatly increases your risk for illness    A healthy diet, regular physical exercise & weight monitoring are important for maintaining a healthy lifestyle    You may be retaining fluid if you have a history of heart failure or if you experience any of the following symptoms:  Weight gain of 3 pounds or more overnight or 5 pounds in a week, increased swelling in our hands or feet or shortness of breath while lying flat in bed.   Please call your doctor as soon as you notice any of these symptoms; do not wait until your next office visit. The discharge information has been reviewed with the patient. The patient verbalized understanding. Discharge medications reviewed with the patient and appropriate educational materials and side effects teaching were provided.   ___________________________________________________________________________________________________________________________________

## 2020-02-03 NOTE — ROUTINE PROCESS
Discharge instructions reviewed with patient and wife. Prescriptions given for Xarelto and med info sheets provided for all new medications. Opportunity for questions provided. Patient voiced understanding of all discharge instructions. Patient's IVs and telemetry monitor removed from patient.

## 2020-02-13 ENCOUNTER — HOSPITAL ENCOUNTER (OUTPATIENT)
Dept: LAB | Age: 40
Discharge: HOME | End: 2020-02-13
Payer: COMMERCIAL

## 2020-02-13 DIAGNOSIS — I26.09 OTHER ACUTE PULMONARY EMBOLISM WITH ACUTE COR PULMONALE (HCC): ICD-10-CM

## 2020-02-13 LAB
ALBUMIN SERPL-MCNC: 4 G/DL (ref 3.5–5)
ALBUMIN/GLOB SERPL: 1.2 {RATIO} (ref 1.2–3.5)
ALP SERPL-CCNC: 63 U/L (ref 50–136)
ALT SERPL-CCNC: 39 U/L (ref 12–65)
ANION GAP SERPL CALC-SCNC: 4 MMOL/L (ref 7–16)
AST SERPL-CCNC: 22 U/L (ref 15–37)
BASOPHILS # BLD: 0.2 K/UL (ref 0–0.2)
BASOPHILS NFR BLD: 2 % (ref 0–2)
BILIRUB SERPL-MCNC: 1.4 MG/DL (ref 0.2–1.1)
BUN SERPL-MCNC: 6 MG/DL (ref 6–23)
CALCIUM SERPL-MCNC: 8.9 MG/DL (ref 8.3–10.4)
CHLORIDE SERPL-SCNC: 106 MMOL/L (ref 98–107)
CO2 SERPL-SCNC: 25 MMOL/L (ref 21–32)
CREAT SERPL-MCNC: 0.75 MG/DL (ref 0.8–1.5)
DIFFERENTIAL METHOD BLD: ABNORMAL
EOSINOPHIL # BLD: 0.3 K/UL (ref 0–0.8)
EOSINOPHIL NFR BLD: 3 % (ref 0.5–7.8)
ERYTHROCYTE [DISTWIDTH] IN BLOOD BY AUTOMATED COUNT: 13.4 % (ref 11.9–14.6)
GLOBULIN SER CALC-MCNC: 3.4 G/DL (ref 2.3–3.5)
GLUCOSE SERPL-MCNC: 85 MG/DL (ref 65–100)
HCT VFR BLD AUTO: 44.6 % (ref 41.1–50.3)
HGB BLD-MCNC: 16.1 G/DL (ref 13.6–17.2)
IMM GRANULOCYTES # BLD AUTO: 0.1 K/UL (ref 0–0.5)
IMM GRANULOCYTES NFR BLD AUTO: 1 % (ref 0–5)
LYMPHOCYTES # BLD: 3.6 K/UL (ref 0.5–4.6)
LYMPHOCYTES NFR BLD: 34 % (ref 13–44)
MCH RBC QN AUTO: 33.4 PG (ref 26.1–32.9)
MCHC RBC AUTO-ENTMCNC: 36.1 G/DL (ref 31.4–35)
MCV RBC AUTO: 92.5 FL (ref 79.6–97.8)
MONOCYTES # BLD: 0.9 K/UL (ref 0.1–1.3)
MONOCYTES NFR BLD: 9 % (ref 4–12)
NEUTS SEG # BLD: 5.4 K/UL (ref 1.7–8.2)
NEUTS SEG NFR BLD: 51 % (ref 43–78)
NRBC # BLD: 0.02 K/UL (ref 0–0.2)
PLATELET # BLD AUTO: 578 K/UL (ref 150–450)
PMV BLD AUTO: 8.3 FL (ref 9.4–12.3)
POTASSIUM SERPL-SCNC: 4.2 MMOL/L (ref 3.5–5.1)
PROT SERPL-MCNC: 7.4 G/DL (ref 6.3–8.2)
RBC # BLD AUTO: 4.82 M/UL (ref 4.23–5.67)
SODIUM SERPL-SCNC: 135 MMOL/L (ref 136–145)
WBC # BLD AUTO: 10.6 K/UL (ref 4.3–11.1)

## 2020-02-13 PROCEDURE — 83520 IMMUNOASSAY QUANT NOS NONAB: CPT

## 2020-02-13 PROCEDURE — 86146 BETA-2 GLYCOPROTEIN ANTIBODY: CPT

## 2020-02-13 PROCEDURE — 85306 CLOT INHIBIT PROT S FREE: CPT

## 2020-02-13 PROCEDURE — 81240 F2 GENE: CPT

## 2020-02-13 PROCEDURE — 85379 FIBRIN DEGRADATION QUANT: CPT

## 2020-02-13 PROCEDURE — 80053 COMPREHEN METABOLIC PANEL: CPT

## 2020-02-13 PROCEDURE — 85613 RUSSELL VIPER VENOM DILUTED: CPT

## 2020-02-13 PROCEDURE — 85025 COMPLETE CBC W/AUTO DIFF WBC: CPT

## 2020-02-13 PROCEDURE — 81241 F5 GENE: CPT

## 2020-02-13 PROCEDURE — 36415 COLL VENOUS BLD VENIPUNCTURE: CPT

## 2020-02-13 PROCEDURE — 85303 CLOT INHIBIT PROT C ACTIVITY: CPT

## 2020-02-13 PROCEDURE — 86148 ANTI-PHOSPHOLIPID ANTIBODY: CPT

## 2020-02-13 PROCEDURE — 85300 ANTITHROMBIN III ACTIVITY: CPT

## 2020-02-13 PROCEDURE — 86147 CARDIOLIPIN ANTIBODY EA IG: CPT

## 2020-02-13 PROCEDURE — 85732 THROMBOPLASTIN TIME PARTIAL: CPT

## 2020-02-15 LAB
AT III AG PPP IA-ACNC: 87 % (ref 72–124)
AT III PPP CHRO-ACNC: 141 % (ref 75–135)
DRVVT MIX, 117894: 88.1 SEC (ref 0–47)
DRVVT RATIO 500585: 2.1 RATIO (ref 0.8–1.2)
INTERPRETATION, 117893: ABNORMAL
PROT S ACT/NOR PPP: 160 % (ref 63–140)
SCREEN APTT: 44.8 SEC (ref 0–51.9)
SCREEN DRVVT: 134.9 SEC (ref 0–47)

## 2020-02-16 LAB — PROT C ACT/NOR PPP CHRO: 122 %

## 2020-02-17 LAB
B2 GLYCOPROT1 IGA SER-ACNC: <9 GPI IGA UNITS (ref 0–25)
B2 GLYCOPROT1 IGG SER-ACNC: <9 GPI IGG UNITS (ref 0–20)
B2 GLYCOPROT1 IGM SER-ACNC: <9 GPI IGM UNITS (ref 0–32)

## 2020-02-18 LAB
F2 GENE MUT ANL BLD/T: NORMAL
F5 GENE MUT ANL BLD/T: NORMAL
Lab: NORMAL
PS IGA SER-ACNC: 3 APS IGA (ref 0–20)
PS IGG SER-ACNC: 5 GPS IGG (ref 0–11)
PS IGM SER-ACNC: 4 MPS IGM (ref 0–25)
REFERENCE LAB,REFLB: NORMAL
TEST DESCRIPTION:,ATST: NORMAL

## 2020-02-19 LAB
CARDIOLIPIN IGA SER IA-ACNC: <9 APL U/ML (ref 0–11)
CARDIOLIPIN IGG SER IA-ACNC: <9 GPL U/ML (ref 0–14)
CARDIOLIPIN IGM SER IA-ACNC: <9 MPL U/ML (ref 0–12)

## 2020-02-20 LAB
PE AB, IGA, 823219: 3.4 U/ML
PE AB, IGG, 823220: 1.5 U/ML
PE AB, IGM, 823221: 2.2 U/ML
PHOSPHATIDYLGLYCEROL IGA, 823222: 3.6 U/ML
PHOSPHATIDYLGLYCEROL IGG, 823223: 3.5 U/ML
PHOSPHATIDYLGLYCEROL IGM, 823224: 1.2 U/ML
PHOSPHATIDYLINOSITOL IGA, 823216: 2.5 U/ML
PHOSPHATIDYLINOSITOL IGG, 823217: 2.6 U/ML
PHOSPHATIDYLINOSITOL IGM, 823218: 0.9 U/ML

## 2020-04-14 ENCOUNTER — HOSPITAL ENCOUNTER (OUTPATIENT)
Dept: LAB | Age: 40
Discharge: HOME | End: 2020-04-14
Payer: COMMERCIAL

## 2020-04-14 ENCOUNTER — HOSPITAL ENCOUNTER (OUTPATIENT)
Dept: ULTRASOUND IMAGING | Age: 40
Discharge: HOME | End: 2020-04-14
Attending: INTERNAL MEDICINE
Payer: COMMERCIAL

## 2020-04-14 DIAGNOSIS — D58.0 HEREDITARY SPHEROCYTOSIS (HCC): ICD-10-CM

## 2020-04-14 DIAGNOSIS — Z90.81 S/P SPLENECTOMY: ICD-10-CM

## 2020-04-14 DIAGNOSIS — R10.11 RUQ PAIN: ICD-10-CM

## 2020-04-14 DIAGNOSIS — Z87.19 HX OF GALLSTONES: ICD-10-CM

## 2020-04-14 LAB
ALBUMIN SERPL-MCNC: 3.8 G/DL (ref 3.5–5)
ALBUMIN/GLOB SERPL: 0.8 {RATIO} (ref 1.2–3.5)
ALP SERPL-CCNC: 87 U/L (ref 50–136)
ALT SERPL-CCNC: 31 U/L (ref 12–65)
ANION GAP SERPL CALC-SCNC: 3 MMOL/L (ref 7–16)
AST SERPL-CCNC: 17 U/L (ref 15–37)
BASOPHILS # BLD: 0.1 K/UL (ref 0–0.2)
BASOPHILS NFR BLD: 1 % (ref 0–2)
BILIRUB SERPL-MCNC: 1 MG/DL (ref 0.2–1.1)
BUN SERPL-MCNC: 10 MG/DL (ref 6–23)
CALCIUM SERPL-MCNC: 9.4 MG/DL (ref 8.3–10.4)
CHLORIDE SERPL-SCNC: 103 MMOL/L (ref 98–107)
CO2 SERPL-SCNC: 31 MMOL/L (ref 21–32)
CREAT SERPL-MCNC: 0.87 MG/DL (ref 0.8–1.5)
DIFFERENTIAL METHOD BLD: ABNORMAL
EOSINOPHIL # BLD: 0.5 K/UL (ref 0–0.8)
EOSINOPHIL NFR BLD: 4 % (ref 0.5–7.8)
ERYTHROCYTE [DISTWIDTH] IN BLOOD BY AUTOMATED COUNT: 14.1 % (ref 11.9–14.6)
GLOBULIN SER CALC-MCNC: 4.5 G/DL (ref 2.3–3.5)
GLUCOSE SERPL-MCNC: 89 MG/DL (ref 65–100)
HCT VFR BLD AUTO: 50.9 % (ref 41.1–50.3)
HGB BLD-MCNC: 18.2 G/DL (ref 13.6–17.2)
IMM GRANULOCYTES # BLD AUTO: 0.1 K/UL (ref 0–0.5)
IMM GRANULOCYTES NFR BLD AUTO: 1 % (ref 0–5)
LYMPHOCYTES # BLD: 2.9 K/UL (ref 0.5–4.6)
LYMPHOCYTES NFR BLD: 24 % (ref 13–44)
MCH RBC QN AUTO: 34.5 PG (ref 26.1–32.9)
MCHC RBC AUTO-ENTMCNC: 35.8 G/DL (ref 31.4–35)
MCV RBC AUTO: 96.6 FL (ref 79.6–97.8)
MONOCYTES # BLD: 1.1 K/UL (ref 0.1–1.3)
MONOCYTES NFR BLD: 9 % (ref 4–12)
NEUTS SEG # BLD: 7.6 K/UL (ref 1.7–8.2)
NEUTS SEG NFR BLD: 61 % (ref 43–78)
NRBC # BLD: 0 K/UL (ref 0–0.2)
PLATELET # BLD AUTO: 560 K/UL (ref 150–450)
PMV BLD AUTO: 8.4 FL (ref 9.4–12.3)
POTASSIUM SERPL-SCNC: 4.5 MMOL/L (ref 3.5–5.1)
PROT SERPL-MCNC: 8.3 G/DL (ref 6.3–8.2)
RBC # BLD AUTO: 5.27 M/UL (ref 4.23–5.6)
SODIUM SERPL-SCNC: 137 MMOL/L (ref 136–145)
WBC # BLD AUTO: 12.3 K/UL (ref 4.3–11.1)

## 2020-04-14 PROCEDURE — 36415 COLL VENOUS BLD VENIPUNCTURE: CPT

## 2020-04-14 PROCEDURE — 80053 COMPREHEN METABOLIC PANEL: CPT

## 2020-04-14 PROCEDURE — 76700 US EXAM ABDOM COMPLETE: CPT

## 2020-04-14 PROCEDURE — 85025 COMPLETE CBC W/AUTO DIFF WBC: CPT

## 2020-04-14 NOTE — PROGRESS NOTES
Let pt know that his US shows a large gallstone, but no acute inflammation that would require surgery at this time. If his sxs worsen, we might have to do something before the pandemic is over. His labs look ok. No major changes.

## 2020-04-24 ENCOUNTER — HOSPITAL ENCOUNTER (OUTPATIENT)
Dept: LAB | Age: 40
Discharge: HOME | End: 2020-04-24
Payer: COMMERCIAL

## 2020-04-24 DIAGNOSIS — Z11.3 SCREEN FOR STD (SEXUALLY TRANSMITTED DISEASE): ICD-10-CM

## 2020-04-24 PROCEDURE — 87491 CHLMYD TRACH DNA AMP PROBE: CPT

## 2020-04-24 PROCEDURE — 87389 HIV-1 AG W/HIV-1&-2 AB AG IA: CPT

## 2020-04-24 PROCEDURE — 36415 COLL VENOUS BLD VENIPUNCTURE: CPT

## 2020-04-25 LAB — HIV 1+2 AB+HIV1 P24 AG SERPL QL IA: NON REACTIVE

## 2020-04-27 LAB
C TRACH RRNA SPEC QL NAA+PROBE: NEGATIVE
N GONORRHOEA RRNA SPEC QL NAA+PROBE: NEGATIVE
SPECIMEN SOURCE: NORMAL

## 2020-07-12 ENCOUNTER — HOSPITAL ENCOUNTER (EMERGENCY)
Age: 40
Discharge: HOME | End: 2020-07-12
Attending: EMERGENCY MEDICINE
Payer: SELF-PAY

## 2020-07-12 ENCOUNTER — APPOINTMENT (OUTPATIENT)
Dept: ULTRASOUND IMAGING | Age: 40
End: 2020-07-12
Attending: EMERGENCY MEDICINE
Payer: SELF-PAY

## 2020-07-12 VITALS
HEART RATE: 89 BPM | BODY MASS INDEX: 22.75 KG/M2 | OXYGEN SATURATION: 98 % | RESPIRATION RATE: 16 BRPM | HEIGHT: 75 IN | WEIGHT: 183 LBS | SYSTOLIC BLOOD PRESSURE: 124 MMHG | TEMPERATURE: 98.3 F | DIASTOLIC BLOOD PRESSURE: 73 MMHG

## 2020-07-12 DIAGNOSIS — M66.0 RUPTURE OF POPLITEAL CYST: Primary | ICD-10-CM

## 2020-07-12 DIAGNOSIS — I82.90 VENOUS THROMBOSIS OF LEG: ICD-10-CM

## 2020-07-12 PROCEDURE — 99283 EMERGENCY DEPT VISIT LOW MDM: CPT

## 2020-07-12 PROCEDURE — 93971 EXTREMITY STUDY: CPT

## 2020-07-13 NOTE — ED TRIAGE NOTES
Arrives with face mask in place. Reports LLE pain and swelling. Originates to left posterior ankle extending up to back of knee. Onset 3 days pta. Denies injury/trauma. Hx PEs, prescribed xarelto 20mg however has been out x1 month. Denies shortness of breath, cough, fever/chills. +smoker. +PMS to left foot.  Orders received from dr jimenez , doppler ultrasound

## 2020-07-13 NOTE — ED PROVIDER NOTES
Patient presents with complaints of pain to the left lower extremity associated swelling over the past 3 days. He denies any trauma or injury. He denies any prolonged immobilization. He does have a history of pulmonary embolism 8 months ago and had been on Xarelto but stopped taking it after he ran out of samples. He denies any chest pain or shortness of breath and review of systems otherwise negative. The history is provided by the patient and medical records. Leg Swelling    This is a new problem. The current episode started more than 2 days ago. The problem occurs constantly. The problem has not changed since onset. The pain is present in the left ankle and left lower leg. The quality of the pain is described as aching. The pain is at a severity of 10/10. The pain is severe. Pertinent negatives include no numbness, full range of motion, no stiffness and no tingling. The symptoms are aggravated by standing and palpation. He has tried nothing for the symptoms. The treatment provided no relief. There has been no history of extremity trauma.         Past Medical History:   Diagnosis Date    Anxiety     GERD (gastroesophageal reflux disease)     pt treats with tums- resolves per pt- sleeps 1 pillow    Hypertension     managed with meds    NSTEMI (non-ST elevated myocardial infarction) ([address] 8/14/2014    no stents- no angioplasty-- treatment with medication only    Other ill-defined conditions(799.89)     spirocytosis, high cholesterol    PUD (peptic ulcer disease) 2014    resolved per pt/ treated    Spherocytosis ([address]     Spherocytosis ([address]     URI (upper respiratory infection) 8/14/2014       Past Surgical History:   Procedure Laterality Date    HX APPENDECTOMY      HX GI      spleen removed    HX HEART CATHETERIZATION  2014    no intervention    HX HEART CATHETERIZATION      HX HERNIA REPAIR      HX SPLENECTOMY  1987    and cholecystectomy     HX SPLENECTOMY      HX TONSILLECTOMY      HX WRIST FRACTURE TX Right 03/2017    titanium donny/screws         Family History:   Problem Relation Age of Onset    Heart Disease Mother     Hypertension Mother     Stroke Mother     Seizures Mother     Heart Disease Father     Hypertension Father     Kidney Disease Father     Stroke Maternal Grandfather     Heart Disease Paternal Grandfather     Diabetes Paternal Grandfather        Social History     Socioeconomic History    Marital status:      Spouse name: Not on file    Number of children: Not on file    Years of education: Not on file    Highest education level: Not on file   Occupational History    Not on file   Social Needs    Financial resource strain: Not on file    Food insecurity     Worry: Not on file     Inability: Not on file    Transportation needs     Medical: Not on file     Non-medical: Not on file   Tobacco Use    Smoking status: Current Every Day Smoker     Packs/day: 0.50     Years: 23.00     Pack years: 11.50    Smokeless tobacco: Former User     Types: Chew   Substance and Sexual Activity    Alcohol use:  Yes     Alcohol/week: 0.0 standard drinks     Frequency: 2-3 times a week     Drinks per session: 10 or more     Comment: 1-2 / week    Drug use: Yes     Types: Prescription    Sexual activity: Yes     Partners: Female   Lifestyle    Physical activity     Days per week: Not on file     Minutes per session: Not on file    Stress: Not on file   Relationships    Social connections     Talks on phone: Not on file     Gets together: Not on file     Attends [employer] service: Not on file     Active member of club or organization: Not on file     Attends meetings of clubs or organizations: Not on file     Relationship status: Not on file    Intimate partner violence     Fear of current or ex partner: Not on file     Emotionally abused: Not on file     Physically abused: Not on file     Forced sexual activity: Not on file   Other Topics Concern    Not on file   Social History Narrative    Not on file         ALLERGIES: Patient has no known allergies. Review of Systems   Musculoskeletal: Negative for stiffness. Neurological: Negative for tingling and numbness. All other systems reviewed and are negative. Vitals:    07/12/20 2011   BP: 134/81   Pulse: 92   Resp: 16   Temp: 98.3 °F (36.8 °C)   SpO2: 97%   Weight: 83 kg (183 lb)   Height: 6' 3\" (1.905 m)            Physical Exam  Constitutional:       Appearance: Normal appearance. HENT:      Head: Normocephalic and atraumatic. Pulmonary:      Effort: Pulmonary effort is normal.      Breath sounds: Normal breath sounds. Abdominal:      General: Abdomen is flat. Bowel sounds are normal.      Palpations: Abdomen is soft. Musculoskeletal: Normal range of motion. General: Swelling and tenderness present. Skin:     General: Skin is warm and dry. Capillary Refill: Capillary refill takes less than 2 seconds. Neurological:      General: No focal deficit present. Mental Status: He is alert and oriented to person, place, and time. Mental status is at baseline. Psychiatric:         Mood and Affect: Mood normal.         Behavior: Behavior normal.         Thought Content:  Thought content normal.          MDM  Number of Diagnoses or Management Options     Amount and/or Complexity of Data Reviewed  Tests in the radiology section of CPT®: ordered and reviewed    Risk of Complications, Morbidity, and/or Mortality  Presenting problems: moderate  Diagnostic procedures: moderate  Management options: moderate    Patient Progress  Patient progress: stable         Procedures

## 2020-07-13 NOTE — DISCHARGE INSTRUCTIONS
Patient Education        Faust's Cyst: Care Instructions  Your Care Instructions     A Baker's cyst is a swelling behind the knee. It may cause pain or stiffness when you bend your knee or straighten it all the way. Baker's cysts are also called popliteal cysts. If you have arthritis or another condition that is the cause of the Baker's cyst, your doctor may treat that condition. A Baker's cyst may go away on its own. If not, or if it is causing a lot of discomfort, your doctor may drain the fluid that has built up behind the knee. In some cases, a Baker's cyst is removed in surgery. There are things you can do at home, such as staying off your leg, to reduce the swelling and pain. Follow-up care is a key part of your treatment and safety. Be sure to make and go to all appointments, and call your doctor if you are having problems. It's also a good idea to know your test results and keep a list of the medicines you take. How can you care for yourself at home? · Rest your knee as much as possible. · Ask your doctor if you can take an over-the-counter pain medicine, such as acetaminophen (Tylenol), ibuprofen (Advil, Motrin), or naproxen (Aleve). Be safe with medicines. Read and follow all instructions on the label. · Use a cane, a crutch, a walker, or another device if you need help to get around. These can help rest your knees. · If you have an elastic bandage, make sure it is snug but not so tight that your leg is numb, tingles, or swells below the bandage. Ask your doctor if you can loosen the bandage if it is too tight. · Follow your doctor's instructions about how much weight you can put on your knee. · Stay at a healthy weight. Being overweight puts extra strain on your knee. When should you call for help? [id number] anytime you think you may need emergency care. For example, call if:  · You have chest pain, are short of breath, or you cough up blood.   Call your doctor now or seek immediate medical care if:  · You have new or worse pain. · Your foot is cool or pale or changes color. · You have tingling, weakness, or numbness in your foot or toes. · You have signs of a blood clot in your leg (called a deep vein thrombosis), such as:  ? Pain in your calf, back of the knee, thigh, or groin. ? Redness or swelling in your leg. Watch closely for changes in your health, and be sure to contact your doctor if:  · You do not get better as expected. Where can you learn more? Go to http://ju-caitlyn.info/  Enter R767 in the search box to learn more about \"Baker's Cyst: Care Instructions. \"  Current as of: March 2, 2020               Content Version: 12.5  © 1642-1177 [employer], Inc[employer]. Care instructions adapted under license by [employer] (which disclaims liability or warranty for this information). If you have questions about a medical condition or this instruction, always ask your healthcare professional. [place] [address] any warranty or liability for your use of this information.        325mg Aspirin Daily

## 2020-07-13 NOTE — ED NOTES

## 2022-03-18 PROBLEM — I10 BENIGN ESSENTIAL HTN: Status: ACTIVE | Noted: 2017-08-08

## 2022-03-19 PROBLEM — D58.0 HEREDITARY SPHEROCYTOSIS (HCC): Status: ACTIVE | Noted: 2020-02-02

## 2022-03-19 PROBLEM — I26.99 PULMONARY EMBOLISM (HCC): Status: ACTIVE | Noted: 2020-02-02

## 2022-03-19 PROBLEM — F41.9 ANXIETY: Status: ACTIVE | Noted: 2020-02-02

## 2022-03-20 PROBLEM — I25.10 CAD (CORONARY ARTERY DISEASE): Status: ACTIVE | Noted: 2020-02-02

## 2022-03-20 PROBLEM — Z91.199 NONCOMPLIANCE: Status: ACTIVE | Noted: 2020-02-02

## 2023-06-16 ENCOUNTER — APPOINTMENT (OUTPATIENT)
Dept: CT IMAGING | Age: 43
DRG: 176 | End: 2023-06-16

## 2023-06-16 ENCOUNTER — APPOINTMENT (OUTPATIENT)
Dept: ULTRASOUND IMAGING | Age: 43
DRG: 176 | End: 2023-06-16

## 2023-06-16 ENCOUNTER — HOSPITAL ENCOUNTER (INPATIENT)
Age: 43
LOS: 2 days | Discharge: HOME | DRG: 176 | End: 2023-06-18
Attending: EMERGENCY MEDICINE | Admitting: FAMILY MEDICINE

## 2023-06-16 ENCOUNTER — APPOINTMENT (OUTPATIENT)
Dept: GENERAL RADIOLOGY | Age: 43
DRG: 176 | End: 2023-06-16

## 2023-06-16 DIAGNOSIS — R77.8 ELEVATED TROPONIN: ICD-10-CM

## 2023-06-16 DIAGNOSIS — I82.4Y1 ACUTE DEEP VEIN THROMBOSIS (DVT) OF PROXIMAL VEIN OF RIGHT LOWER EXTREMITY (HCC): ICD-10-CM

## 2023-06-16 DIAGNOSIS — I26.99 BILATERAL PULMONARY EMBOLISM (HCC): Primary | ICD-10-CM

## 2023-06-16 PROBLEM — I10 BENIGN ESSENTIAL HTN: Status: ACTIVE | Noted: 2017-08-08

## 2023-06-16 PROBLEM — D58.0 HEREDITARY SPHEROCYTOSIS (HCC): Status: ACTIVE | Noted: 2020-02-02

## 2023-06-16 PROBLEM — Z91.199 NONCOMPLIANCE: Status: ACTIVE | Noted: 2020-02-02

## 2023-06-16 PROBLEM — I25.10 CAD (CORONARY ARTERY DISEASE): Status: ACTIVE | Noted: 2020-02-02

## 2023-06-16 PROBLEM — F10.10 ETOH ABUSE: Status: ACTIVE | Noted: 2023-06-16

## 2023-06-16 LAB
ALBUMIN SERPL-MCNC: 3.6 G/DL (ref 3.5–5)
ALBUMIN/GLOB SERPL: 1.1 (ref 0.4–1.6)
ALP SERPL-CCNC: 81 U/L (ref 50–136)
ALT SERPL-CCNC: 51 U/L (ref 12–65)
ANION GAP SERPL CALC-SCNC: 7 MMOL/L (ref 2–11)
AST SERPL-CCNC: 38 U/L (ref 15–37)
BILIRUB SERPL-MCNC: 1.2 MG/DL (ref 0.2–1.1)
BUN SERPL-MCNC: 7 MG/DL (ref 6–23)
CALCIUM SERPL-MCNC: 8.6 MG/DL (ref 8.3–10.4)
CHLORIDE SERPL-SCNC: 109 MMOL/L (ref 101–110)
CO2 SERPL-SCNC: 23 MMOL/L (ref 21–32)
CREAT SERPL-MCNC: 0.9 MG/DL (ref 0.8–1.5)
ERYTHROCYTE [DISTWIDTH] IN BLOOD BY AUTOMATED COUNT: 14.7 % (ref 11.9–14.6)
GLOBULIN SER CALC-MCNC: 3.3 G/DL (ref 2.8–4.5)
GLUCOSE SERPL-MCNC: 106 MG/DL (ref 65–100)
HCT VFR BLD AUTO: 45.5 % (ref 41.1–50.3)
HGB BLD-MCNC: 16.6 G/DL (ref 13.6–17.2)
MAGNESIUM SERPL-MCNC: 2.1 MG/DL (ref 1.8–2.4)
MCH RBC QN AUTO: 38.5 PG (ref 26.1–32.9)
MCHC RBC AUTO-ENTMCNC: 36.5 G/DL (ref 31.4–35)
MCV RBC AUTO: 105.6 FL (ref 82–102)
NRBC # BLD: 0.02 K/UL (ref 0–0.2)
PLATELET # BLD AUTO: 375 K/UL (ref 150–450)
PMV BLD AUTO: 8.6 FL (ref 9.4–12.3)
POTASSIUM SERPL-SCNC: 3.5 MMOL/L (ref 3.5–5.1)
PROT SERPL-MCNC: 6.9 G/DL (ref 6.3–8.2)
RBC # BLD AUTO: 4.31 M/UL (ref 4.23–5.6)
SODIUM SERPL-SCNC: 139 MMOL/L (ref 133–143)
TROPONIN I SERPL HS-MCNC: 309 PG/ML (ref 0–57)
TROPONIN I SERPL HS-MCNC: 310.7 PG/ML (ref 0–57)
WBC # BLD AUTO: 13.9 K/UL (ref 4.3–11.1)

## 2023-06-16 PROCEDURE — 93005 ELECTROCARDIOGRAM TRACING: CPT | Performed by: EMERGENCY MEDICINE

## 2023-06-16 PROCEDURE — 1100000000 HC RM PRIVATE

## 2023-06-16 PROCEDURE — 2500000003 HC RX 250 WO HCPCS: Performed by: EMERGENCY MEDICINE

## 2023-06-16 PROCEDURE — 84484 ASSAY OF TROPONIN QUANT: CPT

## 2023-06-16 PROCEDURE — 93970 EXTREMITY STUDY: CPT

## 2023-06-16 PROCEDURE — 71045 X-RAY EXAM CHEST 1 VIEW: CPT

## 2023-06-16 PROCEDURE — 71260 CT THORAX DX C+: CPT

## 2023-06-16 PROCEDURE — 83735 ASSAY OF MAGNESIUM: CPT

## 2023-06-16 PROCEDURE — 99285 EMERGENCY DEPT VISIT HI MDM: CPT

## 2023-06-16 PROCEDURE — 85520 HEPARIN ASSAY: CPT

## 2023-06-16 PROCEDURE — 94761 N-INVAS EAR/PLS OXIMETRY MLT: CPT

## 2023-06-16 PROCEDURE — 85730 THROMBOPLASTIN TIME PARTIAL: CPT

## 2023-06-16 PROCEDURE — 96374 THER/PROPH/DIAG INJ IV PUSH: CPT

## 2023-06-16 PROCEDURE — 85027 COMPLETE CBC AUTOMATED: CPT

## 2023-06-16 PROCEDURE — 6360000004 HC RX CONTRAST MEDICATION: Performed by: EMERGENCY MEDICINE

## 2023-06-16 PROCEDURE — 6360000002 HC RX W HCPCS: Performed by: EMERGENCY MEDICINE

## 2023-06-16 PROCEDURE — 85610 PROTHROMBIN TIME: CPT

## 2023-06-16 PROCEDURE — 80053 COMPREHEN METABOLIC PANEL: CPT

## 2023-06-16 PROCEDURE — 6370000000 HC RX 637 (ALT 250 FOR IP): Performed by: EMERGENCY MEDICINE

## 2023-06-16 RX ORDER — LORAZEPAM 1 MG/1
1 TABLET ORAL
Status: DISCONTINUED | OUTPATIENT
Start: 2023-06-16 | End: 2023-06-18 | Stop reason: HOSPADM

## 2023-06-16 RX ORDER — HEPARIN SODIUM 1000 [USP'U]/ML
40 INJECTION, SOLUTION INTRAVENOUS; SUBCUTANEOUS PRN
Status: DISCONTINUED | OUTPATIENT
Start: 2023-06-16 | End: 2023-06-18 | Stop reason: HOSPADM

## 2023-06-16 RX ORDER — LORAZEPAM 1 MG/1
2 TABLET ORAL
Status: DISCONTINUED | OUTPATIENT
Start: 2023-06-16 | End: 2023-06-18 | Stop reason: HOSPADM

## 2023-06-16 RX ORDER — HEPARIN SODIUM 1000 [USP'U]/ML
80 INJECTION, SOLUTION INTRAVENOUS; SUBCUTANEOUS PRN
Status: DISCONTINUED | OUTPATIENT
Start: 2023-06-16 | End: 2023-06-18 | Stop reason: HOSPADM

## 2023-06-16 RX ORDER — MULTIVITAMIN WITH IRON
1 TABLET ORAL DAILY
Status: DISCONTINUED | OUTPATIENT
Start: 2023-06-17 | End: 2023-06-18 | Stop reason: HOSPADM

## 2023-06-16 RX ORDER — ACETAMINOPHEN 650 MG/1
650 SUPPOSITORY RECTAL EVERY 6 HOURS PRN
Status: DISCONTINUED | OUTPATIENT
Start: 2023-06-16 | End: 2023-06-18 | Stop reason: HOSPADM

## 2023-06-16 RX ORDER — SODIUM CHLORIDE 9 MG/ML
INJECTION, SOLUTION INTRAVENOUS PRN
Status: DISCONTINUED | OUTPATIENT
Start: 2023-06-16 | End: 2023-06-18 | Stop reason: HOSPADM

## 2023-06-16 RX ORDER — 0.9 % SODIUM CHLORIDE 0.9 %
100 INTRAVENOUS SOLUTION INTRAVENOUS
Status: DISCONTINUED | OUTPATIENT
Start: 2023-06-16 | End: 2023-06-18 | Stop reason: HOSPADM

## 2023-06-16 RX ORDER — LORAZEPAM 2 MG/ML
4 INJECTION INTRAMUSCULAR
Status: DISCONTINUED | OUTPATIENT
Start: 2023-06-16 | End: 2023-06-18 | Stop reason: HOSPADM

## 2023-06-16 RX ORDER — ONDANSETRON 2 MG/ML
4 INJECTION INTRAMUSCULAR; INTRAVENOUS EVERY 6 HOURS PRN
Status: DISCONTINUED | OUTPATIENT
Start: 2023-06-16 | End: 2023-06-16

## 2023-06-16 RX ORDER — LORAZEPAM 2 MG/ML
3 INJECTION INTRAMUSCULAR
Status: DISCONTINUED | OUTPATIENT
Start: 2023-06-16 | End: 2023-06-18 | Stop reason: HOSPADM

## 2023-06-16 RX ORDER — ACETAMINOPHEN 325 MG/1
650 TABLET ORAL EVERY 6 HOURS PRN
Status: DISCONTINUED | OUTPATIENT
Start: 2023-06-16 | End: 2023-06-18 | Stop reason: HOSPADM

## 2023-06-16 RX ORDER — SODIUM CHLORIDE 0.9 % (FLUSH) 0.9 %
5-40 SYRINGE (ML) INJECTION PRN
Status: DISCONTINUED | OUTPATIENT
Start: 2023-06-16 | End: 2023-06-18 | Stop reason: HOSPADM

## 2023-06-16 RX ORDER — HEPARIN SODIUM 1000 [USP'U]/ML
80 INJECTION, SOLUTION INTRAVENOUS; SUBCUTANEOUS ONCE
Status: DISCONTINUED | OUTPATIENT
Start: 2023-06-16 | End: 2023-06-18 | Stop reason: HOSPADM

## 2023-06-16 RX ORDER — ONDANSETRON 4 MG/1
4 TABLET, ORALLY DISINTEGRATING ORAL EVERY 8 HOURS PRN
Status: DISCONTINUED | OUTPATIENT
Start: 2023-06-16 | End: 2023-06-16

## 2023-06-16 RX ORDER — POLYETHYLENE GLYCOL 3350 17 G/17G
17 POWDER, FOR SOLUTION ORAL DAILY PRN
Status: DISCONTINUED | OUTPATIENT
Start: 2023-06-16 | End: 2023-06-18 | Stop reason: HOSPADM

## 2023-06-16 RX ORDER — SODIUM CHLORIDE 0.9 % (FLUSH) 0.9 %
5-40 SYRINGE (ML) INJECTION EVERY 12 HOURS SCHEDULED
Status: DISCONTINUED | OUTPATIENT
Start: 2023-06-16 | End: 2023-06-18 | Stop reason: HOSPADM

## 2023-06-16 RX ORDER — SODIUM CHLORIDE 0.9 % (FLUSH) 0.9 %
10 SYRINGE (ML) INJECTION
Status: DISCONTINUED | OUTPATIENT
Start: 2023-06-16 | End: 2023-06-18 | Stop reason: HOSPADM

## 2023-06-16 RX ORDER — LANOLIN ALCOHOL/MO/W.PET/CERES
100 CREAM (GRAM) TOPICAL DAILY
Status: DISCONTINUED | OUTPATIENT
Start: 2023-06-17 | End: 2023-06-18 | Stop reason: HOSPADM

## 2023-06-16 RX ORDER — NICOTINE 21 MG/24HR
1 PATCH, TRANSDERMAL 24 HOURS TRANSDERMAL ONCE
Status: COMPLETED | OUTPATIENT
Start: 2023-06-16 | End: 2023-06-17

## 2023-06-16 RX ORDER — LORAZEPAM 2 MG/ML
2 INJECTION INTRAMUSCULAR
Status: DISCONTINUED | OUTPATIENT
Start: 2023-06-16 | End: 2023-06-18 | Stop reason: HOSPADM

## 2023-06-16 RX ORDER — LORAZEPAM 1 MG/1
3 TABLET ORAL
Status: DISCONTINUED | OUTPATIENT
Start: 2023-06-16 | End: 2023-06-18 | Stop reason: HOSPADM

## 2023-06-16 RX ORDER — SODIUM CHLORIDE 0.9 % (FLUSH) 0.9 %
5-40 SYRINGE (ML) INJECTION EVERY 12 HOURS SCHEDULED
Status: DISCONTINUED | OUTPATIENT
Start: 2023-06-17 | End: 2023-06-18 | Stop reason: HOSPADM

## 2023-06-16 RX ORDER — NICOTINE 21 MG/24HR
1 PATCH, TRANSDERMAL 24 HOURS TRANSDERMAL DAILY
Status: DISCONTINUED | OUTPATIENT
Start: 2023-06-17 | End: 2023-06-18 | Stop reason: HOSPADM

## 2023-06-16 RX ORDER — LORAZEPAM 1 MG/1
4 TABLET ORAL
Status: DISCONTINUED | OUTPATIENT
Start: 2023-06-16 | End: 2023-06-18 | Stop reason: HOSPADM

## 2023-06-16 RX ORDER — LORAZEPAM 2 MG/ML
1 INJECTION INTRAMUSCULAR
Status: DISCONTINUED | OUTPATIENT
Start: 2023-06-16 | End: 2023-06-18 | Stop reason: HOSPADM

## 2023-06-16 RX ORDER — HEPARIN SODIUM 10000 [USP'U]/100ML
5-30 INJECTION, SOLUTION INTRAVENOUS CONTINUOUS
Status: DISPENSED | OUTPATIENT
Start: 2023-06-16 | End: 2023-06-18

## 2023-06-16 RX ADMIN — HEPARIN SODIUM 18 UNITS/KG/HR: 10000 INJECTION, SOLUTION INTRAVENOUS at 23:08

## 2023-06-16 RX ADMIN — HEPARIN SODIUM 6900 UNITS: 1000 INJECTION INTRAVENOUS; SUBCUTANEOUS at 23:06

## 2023-06-16 RX ADMIN — IOPAMIDOL 100 ML: 755 INJECTION, SOLUTION INTRAVENOUS at 21:13

## 2023-06-16 ASSESSMENT — LIFESTYLE VARIABLES
HOW OFTEN DO YOU HAVE A DRINK CONTAINING ALCOHOL: 4 OR MORE TIMES A WEEK
HOW MANY STANDARD DRINKS CONTAINING ALCOHOL DO YOU HAVE ON A TYPICAL DAY: 5 OR 6

## 2023-06-17 LAB
ANION GAP SERPL CALC-SCNC: 5 MMOL/L (ref 2–11)
APTT PPP: 27.1 SEC (ref 24.5–34.2)
BASOPHILS # BLD: 0.2 K/UL (ref 0–0.2)
BASOPHILS NFR BLD: 2 % (ref 0–2)
BUN SERPL-MCNC: 6 MG/DL (ref 6–23)
CALCIUM SERPL-MCNC: 8.6 MG/DL (ref 8.3–10.4)
CHLORIDE SERPL-SCNC: 110 MMOL/L (ref 101–110)
CO2 SERPL-SCNC: 25 MMOL/L (ref 21–32)
CREAT SERPL-MCNC: 0.7 MG/DL (ref 0.8–1.5)
DIFFERENTIAL METHOD BLD: ABNORMAL
EKG ATRIAL RATE: 104 BPM
EKG DIAGNOSIS: NORMAL
EKG P AXIS: 42 DEGREES
EKG P-R INTERVAL: 151 MS
EKG Q-T INTERVAL: 350 MS
EKG QRS DURATION: 93 MS
EKG QTC CALCULATION (BAZETT): 461 MS
EKG R AXIS: -45 DEGREES
EKG T AXIS: 21 DEGREES
EKG VENTRICULAR RATE: 104 BPM
EOSINOPHIL # BLD: 0.8 K/UL (ref 0–0.8)
EOSINOPHIL NFR BLD: 6 % (ref 0.5–7.8)
ERYTHROCYTE [DISTWIDTH] IN BLOOD BY AUTOMATED COUNT: 14.9 % (ref 11.9–14.6)
GLUCOSE SERPL-MCNC: 106 MG/DL (ref 65–100)
HCT VFR BLD AUTO: 45.1 % (ref 41.1–50.3)
HGB BLD-MCNC: 16.2 G/DL (ref 13.6–17.2)
IMM GRANULOCYTES # BLD AUTO: 0.1 K/UL (ref 0–0.5)
IMM GRANULOCYTES NFR BLD AUTO: 1 % (ref 0–5)
INR PPP: 1
LYMPHOCYTES # BLD: 5.2 K/UL (ref 0.5–4.6)
LYMPHOCYTES NFR BLD: 41 % (ref 13–44)
MAGNESIUM SERPL-MCNC: 2.1 MG/DL (ref 1.8–2.4)
MCH RBC QN AUTO: 38.5 PG (ref 26.1–32.9)
MCHC RBC AUTO-ENTMCNC: 35.9 G/DL (ref 31.4–35)
MCV RBC AUTO: 107.1 FL (ref 82–102)
MONOCYTES # BLD: 1.1 K/UL (ref 0.1–1.3)
MONOCYTES NFR BLD: 9 % (ref 4–12)
NEUTS SEG # BLD: 5.3 K/UL (ref 1.7–8.2)
NEUTS SEG NFR BLD: 42 % (ref 43–78)
NRBC # BLD: 0.04 K/UL (ref 0–0.2)
PLATELET # BLD AUTO: 383 K/UL (ref 150–450)
PMV BLD AUTO: 9 FL (ref 9.4–12.3)
POTASSIUM SERPL-SCNC: 3.5 MMOL/L (ref 3.5–5.1)
PROTHROMBIN TIME: 13.5 SEC (ref 12.6–14.3)
RBC # BLD AUTO: 4.21 M/UL (ref 4.23–5.6)
SODIUM SERPL-SCNC: 140 MMOL/L (ref 133–143)
TROPONIN I SERPL HS-MCNC: 298.4 PG/ML (ref 0–57)
UFH PPP CHRO-ACNC: 0.23 IU/ML (ref 0.3–0.7)
UFH PPP CHRO-ACNC: 0.43 IU/ML (ref 0.3–0.7)
UFH PPP CHRO-ACNC: 0.44 IU/ML (ref 0.3–0.7)
UFH PPP CHRO-ACNC: <0.1 IU/ML (ref 0.3–0.7)
WBC # BLD AUTO: 12.7 K/UL (ref 4.3–11.1)

## 2023-06-17 PROCEDURE — 1100000003 HC PRIVATE W/ TELEMETRY

## 2023-06-17 PROCEDURE — 93010 ELECTROCARDIOGRAM REPORT: CPT | Performed by: INTERNAL MEDICINE

## 2023-06-17 PROCEDURE — 2580000003 HC RX 258: Performed by: FAMILY MEDICINE

## 2023-06-17 PROCEDURE — 85520 HEPARIN ASSAY: CPT

## 2023-06-17 PROCEDURE — 36415 COLL VENOUS BLD VENIPUNCTURE: CPT

## 2023-06-17 PROCEDURE — 6370000000 HC RX 637 (ALT 250 FOR IP): Performed by: FAMILY MEDICINE

## 2023-06-17 PROCEDURE — 84484 ASSAY OF TROPONIN QUANT: CPT

## 2023-06-17 PROCEDURE — 6360000002 HC RX W HCPCS: Performed by: EMERGENCY MEDICINE

## 2023-06-17 PROCEDURE — 85025 COMPLETE CBC W/AUTO DIFF WBC: CPT

## 2023-06-17 PROCEDURE — 80048 BASIC METABOLIC PNL TOTAL CA: CPT

## 2023-06-17 PROCEDURE — 83735 ASSAY OF MAGNESIUM: CPT

## 2023-06-17 PROCEDURE — 6370000000 HC RX 637 (ALT 250 FOR IP): Performed by: NURSE PRACTITIONER

## 2023-06-17 RX ORDER — ATORVASTATIN CALCIUM 80 MG/1
80 TABLET, FILM COATED ORAL NIGHTLY
Qty: 30 TABLET | Refills: 3 | Status: SHIPPED | OUTPATIENT
Start: 2023-06-17

## 2023-06-17 RX ORDER — HEPARIN SODIUM 1000 [USP'U]/ML
40 INJECTION, SOLUTION INTRAVENOUS; SUBCUTANEOUS ONCE
Status: DISCONTINUED | OUTPATIENT
Start: 2023-06-17 | End: 2023-06-17

## 2023-06-17 RX ORDER — ASPIRIN 81 MG/1
81 TABLET, CHEWABLE ORAL DAILY
Status: DISCONTINUED | OUTPATIENT
Start: 2023-06-17 | End: 2023-06-18 | Stop reason: HOSPADM

## 2023-06-17 RX ORDER — ASPIRIN 81 MG/1
81 TABLET, CHEWABLE ORAL DAILY
Qty: 30 TABLET | Refills: 3 | Status: SHIPPED | OUTPATIENT
Start: 2023-06-18

## 2023-06-17 RX ORDER — CHLORDIAZEPOXIDE HYDROCHLORIDE 5 MG/1
10 CAPSULE, GELATIN COATED ORAL 3 TIMES DAILY
Status: DISCONTINUED | OUTPATIENT
Start: 2023-06-17 | End: 2023-06-18 | Stop reason: HOSPADM

## 2023-06-17 RX ORDER — LANOLIN ALCOHOL/MO/W.PET/CERES
100 CREAM (GRAM) TOPICAL DAILY
Qty: 30 TABLET | Refills: 3 | Status: SHIPPED | OUTPATIENT
Start: 2023-06-18

## 2023-06-17 RX ORDER — MULTIVITAMIN WITH IRON
1 TABLET ORAL DAILY
Qty: 30 TABLET | Refills: 0 | Status: SHIPPED | OUTPATIENT
Start: 2023-06-18 | End: 2023-07-18

## 2023-06-17 RX ORDER — ATORVASTATIN CALCIUM 80 MG/1
80 TABLET, FILM COATED ORAL NIGHTLY
Status: DISCONTINUED | OUTPATIENT
Start: 2023-06-17 | End: 2023-06-18 | Stop reason: HOSPADM

## 2023-06-17 RX ADMIN — SODIUM CHLORIDE, PRESERVATIVE FREE 10 ML: 5 INJECTION INTRAVENOUS at 21:42

## 2023-06-17 RX ADMIN — SODIUM CHLORIDE, PRESERVATIVE FREE 5 ML: 5 INJECTION INTRAVENOUS at 08:51

## 2023-06-17 RX ADMIN — SODIUM CHLORIDE, PRESERVATIVE FREE 5 ML: 5 INJECTION INTRAVENOUS at 08:52

## 2023-06-17 RX ADMIN — ATORVASTATIN CALCIUM 80 MG: 80 TABLET, FILM COATED ORAL at 21:42

## 2023-06-17 RX ADMIN — HEPARIN SODIUM 3450 UNITS: 1000 INJECTION INTRAVENOUS; SUBCUTANEOUS at 12:29

## 2023-06-17 RX ADMIN — SODIUM CHLORIDE, PRESERVATIVE FREE 5 ML: 5 INJECTION INTRAVENOUS at 21:44

## 2023-06-17 RX ADMIN — CHLORDIAZEPOXIDE HYDROCHLORIDE 10 MG: 5 CAPSULE ORAL at 21:42

## 2023-06-17 RX ADMIN — ASPIRIN 81 MG: 81 TABLET, CHEWABLE ORAL at 13:05

## 2023-06-17 RX ADMIN — SODIUM CHLORIDE, PRESERVATIVE FREE 5 ML: 5 INJECTION INTRAVENOUS at 09:18

## 2023-06-17 RX ADMIN — CHLORDIAZEPOXIDE HYDROCHLORIDE 10 MG: 5 CAPSULE ORAL at 13:03

## 2023-06-17 RX ADMIN — CHLORDIAZEPOXIDE HYDROCHLORIDE 10 MG: 5 CAPSULE ORAL at 09:17

## 2023-06-17 RX ADMIN — B-COMPLEX W/ C & FOLIC ACID TAB 1 TABLET: TAB at 09:17

## 2023-06-17 RX ADMIN — Medication 100 MG: at 08:49

## 2023-06-17 ASSESSMENT — PAIN SCALES - GENERAL
PAINLEVEL_OUTOF10: 0

## 2023-06-18 VITALS
BODY MASS INDEX: 23.14 KG/M2 | TEMPERATURE: 98.2 F | WEIGHT: 190 LBS | RESPIRATION RATE: 19 BRPM | HEART RATE: 61 BPM | HEIGHT: 76 IN | SYSTOLIC BLOOD PRESSURE: 134 MMHG | DIASTOLIC BLOOD PRESSURE: 95 MMHG | OXYGEN SATURATION: 96 %

## 2023-06-18 LAB
ANION GAP SERPL CALC-SCNC: 6 MMOL/L (ref 2–11)
BASOPHILS # BLD: 0.2 K/UL (ref 0–0.2)
BASOPHILS NFR BLD: 1 % (ref 0–2)
BUN SERPL-MCNC: 9 MG/DL (ref 6–23)
CALCIUM SERPL-MCNC: 8.6 MG/DL (ref 8.3–10.4)
CHLORIDE SERPL-SCNC: 107 MMOL/L (ref 101–110)
CO2 SERPL-SCNC: 26 MMOL/L (ref 21–32)
CREAT SERPL-MCNC: 0.8 MG/DL (ref 0.8–1.5)
DIFFERENTIAL METHOD BLD: ABNORMAL
EOSINOPHIL # BLD: 0.9 K/UL (ref 0–0.8)
EOSINOPHIL NFR BLD: 6 % (ref 0.5–7.8)
ERYTHROCYTE [DISTWIDTH] IN BLOOD BY AUTOMATED COUNT: 14.6 % (ref 11.9–14.6)
GLUCOSE SERPL-MCNC: 109 MG/DL (ref 65–100)
HCT VFR BLD AUTO: 45.4 % (ref 41.1–50.3)
HGB BLD-MCNC: 16.7 G/DL (ref 13.6–17.2)
IMM GRANULOCYTES # BLD AUTO: 0.2 K/UL (ref 0–0.5)
IMM GRANULOCYTES NFR BLD AUTO: 1 % (ref 0–5)
LYMPHOCYTES # BLD: 5.9 K/UL (ref 0.5–4.6)
LYMPHOCYTES NFR BLD: 38 % (ref 13–44)
MAGNESIUM SERPL-MCNC: 2.1 MG/DL (ref 1.8–2.4)
MCH RBC QN AUTO: 39.6 PG (ref 26.1–32.9)
MCHC RBC AUTO-ENTMCNC: 36.8 G/DL (ref 31.4–35)
MCV RBC AUTO: 107.6 FL (ref 82–102)
MONOCYTES # BLD: 1.2 K/UL (ref 0.1–1.3)
MONOCYTES NFR BLD: 8 % (ref 4–12)
NEUTS SEG # BLD: 7.1 K/UL (ref 1.7–8.2)
NEUTS SEG NFR BLD: 46 % (ref 43–78)
NRBC # BLD: 0.05 K/UL (ref 0–0.2)
PLATELET # BLD AUTO: 400 K/UL (ref 150–450)
PLATELET COMMENT: ADEQUATE
PMV BLD AUTO: 9.3 FL (ref 9.4–12.3)
POTASSIUM SERPL-SCNC: 3.9 MMOL/L (ref 3.5–5.1)
RBC # BLD AUTO: 4.22 M/UL (ref 4.23–5.6)
RBC MORPH BLD: ABNORMAL
RBC MORPH BLD: ABNORMAL
SODIUM SERPL-SCNC: 139 MMOL/L (ref 133–143)
UFH PPP CHRO-ACNC: 0.42 IU/ML (ref 0.3–0.7)
WBC # BLD AUTO: 15.5 K/UL (ref 4.3–11.1)
WBC MORPH BLD: ABNORMAL

## 2023-06-18 PROCEDURE — 83735 ASSAY OF MAGNESIUM: CPT

## 2023-06-18 PROCEDURE — 6370000000 HC RX 637 (ALT 250 FOR IP): Performed by: FAMILY MEDICINE

## 2023-06-18 PROCEDURE — 85520 HEPARIN ASSAY: CPT

## 2023-06-18 PROCEDURE — 36415 COLL VENOUS BLD VENIPUNCTURE: CPT

## 2023-06-18 PROCEDURE — 80048 BASIC METABOLIC PNL TOTAL CA: CPT

## 2023-06-18 PROCEDURE — 85025 COMPLETE CBC W/AUTO DIFF WBC: CPT

## 2023-06-18 PROCEDURE — 2500000003 HC RX 250 WO HCPCS: Performed by: NURSE PRACTITIONER

## 2023-06-18 PROCEDURE — 6370000000 HC RX 637 (ALT 250 FOR IP): Performed by: NURSE PRACTITIONER

## 2023-06-18 PROCEDURE — 2580000003 HC RX 258: Performed by: FAMILY MEDICINE

## 2023-06-18 RX ADMIN — SODIUM CHLORIDE, PRESERVATIVE FREE 10 ML: 5 INJECTION INTRAVENOUS at 08:29

## 2023-06-18 RX ADMIN — HEPARIN SODIUM 20 UNITS/KG/HR: 10000 INJECTION, SOLUTION INTRAVENOUS at 07:01

## 2023-06-18 RX ADMIN — B-COMPLEX W/ C & FOLIC ACID TAB 1 TABLET: TAB at 08:26

## 2023-06-18 RX ADMIN — ASPIRIN 81 MG: 81 TABLET, CHEWABLE ORAL at 08:26

## 2023-06-18 RX ADMIN — CHLORDIAZEPOXIDE HYDROCHLORIDE 10 MG: 5 CAPSULE ORAL at 08:26

## 2023-06-18 RX ADMIN — HEPARIN SODIUM 20 UNITS/KG/HR: 10000 INJECTION, SOLUTION INTRAVENOUS at 06:32

## 2023-06-18 RX ADMIN — RIVAROXABAN 15 MG: 15 TABLET, FILM COATED ORAL at 08:26

## 2023-06-18 RX ADMIN — Medication 100 MG: at 08:26

## 2023-06-18 ASSESSMENT — PAIN SCALES - GENERAL
PAINLEVEL_OUTOF10: 0
PAINLEVEL_OUTOF10: 0

## 2023-06-21 ENCOUNTER — TELEPHONE (OUTPATIENT)
Dept: INTERNAL MEDICINE CLINIC | Facility: CLINIC | Age: 43
End: 2023-06-21

## 2023-06-21 NOTE — TELEPHONE ENCOUNTER
Spoke with patient regarding TCM care. He is in the process of arranging a follow-up with his PCP.   Otherwise he is feeling much better

## 2024-02-12 ENCOUNTER — HOSPITAL ENCOUNTER (EMERGENCY)
Age: 44
Discharge: HOME | End: 2024-02-13
Attending: STUDENT IN AN ORGANIZED HEALTH CARE EDUCATION/TRAINING PROGRAM
Payer: COMMERCIAL

## 2024-02-12 ENCOUNTER — APPOINTMENT (OUTPATIENT)
Dept: GENERAL RADIOLOGY | Age: 44
End: 2024-02-12
Payer: COMMERCIAL

## 2024-02-12 DIAGNOSIS — R07.9 CHEST PAIN, UNSPECIFIED TYPE: Primary | ICD-10-CM

## 2024-02-12 LAB
ANION GAP SERPL CALC-SCNC: 4 MMOL/L (ref 2–11)
BASOPHILS # BLD: 0.1 K/UL (ref 0–0.2)
BASOPHILS NFR BLD: 1 % (ref 0–2)
BUN SERPL-MCNC: 13 MG/DL (ref 6–23)
CALCIUM SERPL-MCNC: 9 MG/DL (ref 8.3–10.4)
CHLORIDE SERPL-SCNC: 111 MMOL/L (ref 103–113)
CO2 SERPL-SCNC: 26 MMOL/L (ref 21–32)
CREAT SERPL-MCNC: 1.1 MG/DL (ref 0.8–1.5)
D DIMER PPP FEU-MCNC: 0.47 UG/ML(FEU)
DIFFERENTIAL METHOD BLD: ABNORMAL
EOSINOPHIL # BLD: 0.8 K/UL (ref 0–0.8)
EOSINOPHIL NFR BLD: 6 % (ref 0.5–7.8)
ERYTHROCYTE [DISTWIDTH] IN BLOOD BY AUTOMATED COUNT: 14.2 % (ref 11.9–14.6)
GLUCOSE SERPL-MCNC: 106 MG/DL (ref 65–100)
HCT VFR BLD AUTO: 47.9 % (ref 41.1–50.3)
HGB BLD-MCNC: 17.1 G/DL (ref 13.6–17.2)
IMM GRANULOCYTES # BLD AUTO: 0.1 K/UL (ref 0–0.5)
IMM GRANULOCYTES NFR BLD AUTO: 1 % (ref 0–5)
LYMPHOCYTES # BLD: 6.3 K/UL (ref 0.5–4.6)
LYMPHOCYTES NFR BLD: 48 % (ref 13–44)
MAGNESIUM SERPL-MCNC: 2 MG/DL (ref 1.8–2.4)
MCH RBC QN AUTO: 33.8 PG (ref 26.1–32.9)
MCHC RBC AUTO-ENTMCNC: 35.7 G/DL (ref 31.4–35)
MCV RBC AUTO: 94.7 FL (ref 82–102)
MONOCYTES # BLD: 0.9 K/UL (ref 0.1–1.3)
MONOCYTES NFR BLD: 7 % (ref 4–12)
NEUTS SEG # BLD: 4.8 K/UL (ref 1.7–8.2)
NEUTS SEG NFR BLD: 37 % (ref 43–78)
NRBC # BLD: 0 K/UL (ref 0–0.2)
PLATELET # BLD AUTO: 488 K/UL (ref 150–450)
PLATELET COMMENT: ADEQUATE
PMV BLD AUTO: 8.2 FL (ref 9.4–12.3)
POTASSIUM SERPL-SCNC: 3.6 MMOL/L (ref 3.5–5.1)
RBC # BLD AUTO: 5.06 M/UL (ref 4.23–5.6)
SODIUM SERPL-SCNC: 141 MMOL/L (ref 136–146)
TROPONIN I SERPL HS-MCNC: 68.3 PG/ML (ref 0–14)
WBC # BLD AUTO: 13 K/UL (ref 4.3–11.1)
WBC MORPH BLD: ABNORMAL

## 2024-02-12 PROCEDURE — 99285 EMERGENCY DEPT VISIT HI MDM: CPT

## 2024-02-12 PROCEDURE — 71046 X-RAY EXAM CHEST 2 VIEWS: CPT

## 2024-02-12 PROCEDURE — 83735 ASSAY OF MAGNESIUM: CPT

## 2024-02-12 PROCEDURE — 93005 ELECTROCARDIOGRAM TRACING: CPT | Performed by: STUDENT IN AN ORGANIZED HEALTH CARE EDUCATION/TRAINING PROGRAM

## 2024-02-12 PROCEDURE — 85379 FIBRIN DEGRADATION QUANT: CPT

## 2024-02-12 PROCEDURE — 84484 ASSAY OF TROPONIN QUANT: CPT

## 2024-02-12 PROCEDURE — 80048 BASIC METABOLIC PNL TOTAL CA: CPT

## 2024-02-12 PROCEDURE — 85025 COMPLETE CBC W/AUTO DIFF WBC: CPT

## 2024-02-13 ENCOUNTER — APPOINTMENT (OUTPATIENT)
Dept: CT IMAGING | Age: 44
End: 2024-02-13
Payer: COMMERCIAL

## 2024-02-13 VITALS
TEMPERATURE: 97.7 F | WEIGHT: 210 LBS | SYSTOLIC BLOOD PRESSURE: 101 MMHG | BODY MASS INDEX: 25.57 KG/M2 | OXYGEN SATURATION: 95 % | RESPIRATION RATE: 22 BRPM | DIASTOLIC BLOOD PRESSURE: 77 MMHG | HEART RATE: 77 BPM | HEIGHT: 76 IN

## 2024-02-13 LAB
EKG ATRIAL RATE: 103 BPM
EKG DIAGNOSIS: NORMAL
EKG P AXIS: 71 DEGREES
EKG P-R INTERVAL: 142 MS
EKG Q-T INTERVAL: 344 MS
EKG QRS DURATION: 84 MS
EKG QTC CALCULATION (BAZETT): 450 MS
EKG R AXIS: 96 DEGREES
EKG T AXIS: 52 DEGREES
EKG VENTRICULAR RATE: 103 BPM
TROPONIN I SERPL HS-MCNC: 62.9 PG/ML (ref 0–14)

## 2024-02-13 PROCEDURE — 6360000004 HC RX CONTRAST MEDICATION: Performed by: STUDENT IN AN ORGANIZED HEALTH CARE EDUCATION/TRAINING PROGRAM

## 2024-02-13 PROCEDURE — 93010 ELECTROCARDIOGRAM REPORT: CPT | Performed by: INTERNAL MEDICINE

## 2024-02-13 PROCEDURE — 71260 CT THORAX DX C+: CPT

## 2024-02-13 PROCEDURE — 84484 ASSAY OF TROPONIN QUANT: CPT

## 2024-02-13 PROCEDURE — 6370000000 HC RX 637 (ALT 250 FOR IP): Performed by: STUDENT IN AN ORGANIZED HEALTH CARE EDUCATION/TRAINING PROGRAM

## 2024-02-13 RX ORDER — RIVAROXABAN 10 MG/1
10 TABLET, FILM COATED ORAL
Qty: 90 TABLET | Refills: 0 | Status: SHIPPED | OUTPATIENT
Start: 2024-02-13

## 2024-02-13 RX ORDER — ASPIRIN 81 MG/1
324 TABLET, CHEWABLE ORAL
Status: COMPLETED | OUTPATIENT
Start: 2024-02-13 | End: 2024-02-13

## 2024-02-13 RX ORDER — NITROGLYCERIN 0.4 MG/1
0.4 TABLET SUBLINGUAL EVERY 5 MIN PRN
Qty: 25 TABLET | Refills: 0 | Status: SHIPPED | OUTPATIENT
Start: 2024-02-13

## 2024-02-13 RX ADMIN — IOPAMIDOL 100 ML: 755 INJECTION, SOLUTION INTRAVENOUS at 02:02

## 2024-02-13 RX ADMIN — RIVAROXABAN 10 MG: 10 TABLET, FILM COATED ORAL at 03:27

## 2024-02-13 RX ADMIN — ASPIRIN 324 MG: 81 TABLET, CHEWABLE ORAL at 03:26

## 2024-02-13 NOTE — ED NOTES
I have reviewed discharge instructions with the patient.  The patient verbalized understanding.    Patient left ED via Discharge Method: ambulatory to Home with family.    Opportunity for questions and clarification provided.       Patient given 2 scripts.         To continue your aftercare when you leave the hospital, you may receive an automated call from our care team to check in on how you are doing.  This is a free service and part of our promise to provide the best care and service to meet your aftercare needs.” If you have questions, or wish to unsubscribe from this service please call [phone].  Thank you for Choosing our [facility] Emergency Department.        Anamaria Berger RN  02/13/24 [id number]

## 2024-02-13 NOTE — ED PROVIDER NOTES
No mass or axillary lymphadenopathy.     UPPER ABDOMEN: The visualized upper abdomen is unremarkable.     BONES: No suspicious osseous lesion.         Impression    1.  No pulmonary embolus.  2.  No acute findings within the chest.  3.  Mild bilateral bronchial wall thickening. Correlate clinically for  bronchitis       Basic Metabolic Panel   Result Value Ref Range    Sodium 141 136 - 146 mmol/L    Potassium 3.6 3.5 - 5.1 mmol/L    Chloride 111 103 - 113 mmol/L    CO2 26 21 - 32 mmol/L    Anion Gap 4 2 - 11 mmol/L    Glucose 106 (H) 65 - 100 mg/dL    BUN 13 6 - 23 MG/DL    Creatinine 1.10 0.8 - 1.5 MG/DL    Est, Glom Filt Rate >60 >60 ml/min/1.73m2    Calcium 9.0 8.3 - 10.4 MG/DL   CBC with Auto Differential   Result Value Ref Range    WBC 13.0 (H) 4.3 - 11.1 K/uL    RBC 5.06 4.23 - 5.6 M/uL    Hemoglobin 17.1 13.6 - 17.2 g/dL    Hematocrit 47.9 41.1 - 50.3 %    MCV 94.7 82 - 102 FL    MCH 33.8 (H) 26.1 - 32.9 PG    MCHC 35.7 (H) 31.4 - 35.0 g/dL    RDW 14.2 11.9 - 14.6 %    Platelets 488 (H) 150 - 450 K/uL    MPV 8.2 (L) 9.4 - 12.3 FL    nRBC 0.00 0.0 - 0.2 K/uL    Neutrophils % 37 (L) 43 - 78 %    Lymphocytes % 48 (H) 13 - 44 %    Monocytes % 7 4.0 - 12.0 %    Eosinophils % 6 0.5 - 7.8 %    Basophils % 1 0.0 - 2.0 %    Immature Granulocytes 1 0.0 - 5.0 %    Neutrophils Absolute 4.8 1.7 - 8.2 K/UL    Lymphocytes Absolute 6.3 (H) 0.5 - 4.6 K/UL    Monocytes Absolute 0.9 0.1 - 1.3 K/UL    Eosinophils Absolute 0.8 0.0 - 0.8 K/UL    Basophils Absolute 0.1 0.0 - 0.2 K/UL    Absolute Immature Granulocyte 0.1 0.0 - 0.5 K/UL    WBC Comment Result Confirmed By Smear      Platelet Comment ADEQUATE      Differential Type AUTOMATED     Troponin   Result Value Ref Range    Troponin, High Sensitivity 68.3 (H) 0 - 14 pg/mL   Troponin   Result Value Ref Range    Troponin, High Sensitivity 62.9 (H) 0 - 14 pg/mL   D-Dimer, Quantitative   Result Value Ref Range    D-Dimer, Quant 0.47 <0.56 ug/ml(FEU)   Magnesium   Result Value Ref  (2) compromised

## 2024-02-13 NOTE — DISCHARGE INSTRUCTIONS
Take Xarelto daily as prescribed.  Take 81 mg aspirin daily.  Follow-up with cardiology this week, a referral was made for you.  If you not hear from them in 2 business days then call their office.  Return to the ER for worsening or worrisome symptoms   Consent (Lip)/Introductory Paragraph: The rationale for Mohs was explained to the patient and consent was obtained. The risks, benefits and alternatives to therapy were discussed in detail. Specifically, the risks of lip deformity, changes in the oral aperture, infection, scarring, bleeding, prolonged wound healing, incomplete removal, allergy to anesthesia, nerve injury and recurrence were addressed. Prior to the procedure, the treatment site was clearly identified and confirmed by the patient. All components of Universal Protocol/PAUSE Rule completed.

## 2024-02-13 NOTE — ED TRIAGE NOTES
Started having chest pain and shob around 30 min ago, hx of heart attacks, COPD, and blood clots. Pt states he is supposed to take blood thinners but doesnt

## 2024-04-16 ENCOUNTER — TRANSCRIBE ORDERS (OUTPATIENT)
Dept: SCHEDULING | Age: 44
End: 2024-04-16

## 2024-04-16 DIAGNOSIS — R10.9 ABDOMINAL PAIN, UNSPECIFIED ABDOMINAL LOCATION: Primary | ICD-10-CM

## 2024-04-16 DIAGNOSIS — Z90.81 HISTORY OF SPLENECTOMY: ICD-10-CM

## 2024-04-16 DIAGNOSIS — D72.829 LEUKOCYTOSIS, UNSPECIFIED TYPE: ICD-10-CM

## 2025-01-29 ENCOUNTER — HOSPITAL ENCOUNTER (EMERGENCY)
Age: 45
Discharge: HOME | End: 2025-01-29
Attending: EMERGENCY MEDICINE
Payer: COMMERCIAL

## 2025-01-29 VITALS
SYSTOLIC BLOOD PRESSURE: 111 MMHG | OXYGEN SATURATION: 94 % | WEIGHT: 195 LBS | RESPIRATION RATE: 26 BRPM | DIASTOLIC BLOOD PRESSURE: 81 MMHG | TEMPERATURE: 98.2 F | HEIGHT: 76 IN | BODY MASS INDEX: 23.75 KG/M2 | HEART RATE: 86 BPM

## 2025-01-29 DIAGNOSIS — I26.94 MULTIPLE SUBSEGMENTAL PULMONARY EMBOLI WITHOUT ACUTE COR PULMONALE (HCC): Primary | ICD-10-CM

## 2025-01-29 LAB
ALBUMIN SERPL-MCNC: 3.9 G/DL (ref 3.5–5)
ALBUMIN/GLOB SERPL: 1.1 (ref 1–1.9)
ALP SERPL-CCNC: 85 U/L (ref 40–129)
ALT SERPL-CCNC: 22 U/L (ref 8–55)
ANION GAP SERPL CALC-SCNC: 16 MMOL/L (ref 7–16)
AST SERPL-CCNC: 33 U/L (ref 15–37)
BILIRUB SERPL-MCNC: 1.6 MG/DL (ref 0–1.2)
BUN SERPL-MCNC: 5 MG/DL (ref 6–23)
CALCIUM SERPL-MCNC: 9.5 MG/DL (ref 8.8–10.2)
CHLORIDE SERPL-SCNC: 100 MMOL/L (ref 98–107)
CO2 SERPL-SCNC: 24 MMOL/L (ref 20–29)
CREAT SERPL-MCNC: 0.97 MG/DL (ref 0.8–1.3)
ERYTHROCYTE [DISTWIDTH] IN BLOOD BY AUTOMATED COUNT: 14.4 % (ref 11.9–14.6)
GLOBULIN SER CALC-MCNC: 3.6 G/DL (ref 2.3–3.5)
GLUCOSE SERPL-MCNC: 85 MG/DL (ref 70–99)
HCT VFR BLD AUTO: 52.6 % (ref 41.1–50.3)
HGB BLD-MCNC: 18.9 G/DL (ref 13.6–17.2)
MCH RBC QN AUTO: 37.2 PG (ref 26.1–32.9)
MCHC RBC AUTO-ENTMCNC: 35.9 G/DL (ref 31.4–35)
MCV RBC AUTO: 103.5 FL (ref 82–102)
NRBC # BLD: 0.04 K/UL (ref 0–0.2)
PLATELET # BLD AUTO: 411 K/UL (ref 150–450)
PMV BLD AUTO: 9 FL (ref 9.4–12.3)
POTASSIUM SERPL-SCNC: 3.8 MMOL/L (ref 3.5–5.1)
PROT SERPL-MCNC: 7.4 G/DL (ref 6.3–8.2)
RBC # BLD AUTO: 5.08 M/UL (ref 4.23–5.6)
SODIUM SERPL-SCNC: 139 MMOL/L (ref 136–145)
TROPONIN T SERPL HS-MCNC: 89 NG/L (ref 0–22)
TROPONIN T SERPL HS-MCNC: 89 NG/L (ref 0–22)
WBC # BLD AUTO: 13.3 K/UL (ref 4.3–11.1)

## 2025-01-29 PROCEDURE — 6370000000 HC RX 637 (ALT 250 FOR IP): Performed by: EMERGENCY MEDICINE

## 2025-01-29 PROCEDURE — 99284 EMERGENCY DEPT VISIT MOD MDM: CPT

## 2025-01-29 PROCEDURE — 80053 COMPREHEN METABOLIC PANEL: CPT

## 2025-01-29 PROCEDURE — 93005 ELECTROCARDIOGRAM TRACING: CPT | Performed by: EMERGENCY MEDICINE

## 2025-01-29 PROCEDURE — 84484 ASSAY OF TROPONIN QUANT: CPT

## 2025-01-29 PROCEDURE — 85027 COMPLETE CBC AUTOMATED: CPT

## 2025-01-29 PROCEDURE — 94640 AIRWAY INHALATION TREATMENT: CPT

## 2025-01-29 RX ORDER — IPRATROPIUM BROMIDE AND ALBUTEROL SULFATE 2.5; .5 MG/3ML; MG/3ML
1 SOLUTION RESPIRATORY (INHALATION)
Status: DISCONTINUED | OUTPATIENT
Start: 2025-01-29 | End: 2025-01-29 | Stop reason: HOSPADM

## 2025-01-29 RX ORDER — HEPARIN SODIUM 10000 [USP'U]/100ML
5-30 INJECTION, SOLUTION INTRAVENOUS CONTINUOUS
Status: DISCONTINUED | OUTPATIENT
Start: 2025-01-30 | End: 2025-01-29 | Stop reason: HOSPADM

## 2025-01-29 RX ORDER — HEPARIN SODIUM 1000 [USP'U]/ML
40 INJECTION, SOLUTION INTRAVENOUS; SUBCUTANEOUS PRN
Status: DISCONTINUED | OUTPATIENT
Start: 2025-01-29 | End: 2025-01-29

## 2025-01-29 RX ORDER — HEPARIN SODIUM 10000 [USP'U]/100ML
5-30 INJECTION, SOLUTION INTRAVENOUS CONTINUOUS
Status: DISCONTINUED | OUTPATIENT
Start: 2025-01-29 | End: 2025-01-29

## 2025-01-29 RX ORDER — HEPARIN SODIUM 1000 [USP'U]/ML
40 INJECTION, SOLUTION INTRAVENOUS; SUBCUTANEOUS PRN
Status: DISCONTINUED | OUTPATIENT
Start: 2025-01-30 | End: 2025-01-29 | Stop reason: HOSPADM

## 2025-01-29 RX ORDER — HEPARIN SODIUM 1000 [USP'U]/ML
80 INJECTION, SOLUTION INTRAVENOUS; SUBCUTANEOUS PRN
Status: DISCONTINUED | OUTPATIENT
Start: 2025-01-29 | End: 2025-01-29

## 2025-01-29 RX ORDER — HEPARIN SODIUM 1000 [USP'U]/ML
80 INJECTION, SOLUTION INTRAVENOUS; SUBCUTANEOUS ONCE
Status: DISCONTINUED | OUTPATIENT
Start: 2025-01-29 | End: 2025-01-29

## 2025-01-29 RX ORDER — HEPARIN SODIUM 1000 [USP'U]/ML
80 INJECTION, SOLUTION INTRAVENOUS; SUBCUTANEOUS PRN
Status: DISCONTINUED | OUTPATIENT
Start: 2025-01-30 | End: 2025-01-29 | Stop reason: HOSPADM

## 2025-01-29 RX ADMIN — IPRATROPIUM BROMIDE AND ALBUTEROL SULFATE 1 DOSE: .5; 3 SOLUTION RESPIRATORY (INHALATION) at 19:58

## 2025-01-29 ASSESSMENT — LIFESTYLE VARIABLES
HOW MANY STANDARD DRINKS CONTAINING ALCOHOL DO YOU HAVE ON A TYPICAL DAY: 3 OR 4
HOW OFTEN DO YOU HAVE A DRINK CONTAINING ALCOHOL: 2-4 TIMES A MONTH

## 2025-01-29 ASSESSMENT — PAIN DESCRIPTION - LOCATION: LOCATION: CHEST

## 2025-01-29 ASSESSMENT — PAIN SCALES - GENERAL: PAINLEVEL_OUTOF10: 10

## 2025-01-29 NOTE — ED PROVIDER NOTES
Emergency Department Provider Note       PCP: Skyler Sage Jr., MD   Age: 44 y.o.   Sex: male     DISPOSITION Decision To Discharge 01/29/2025 09:12:09 PM    ICD-10-CM    1. Multiple subsegmental pulmonary emboli without acute cor pulmonale (HCC)  I26.94           Medical Decision Making     Patient presents for evaluation of his bilateral PE and right lower extremity DVT.  Findings were discovered on outpatient exams prior to arrival.  Blood work be obtained to look for signs of any cardiac strain or other abnormalities.  He will be started on heparin.  He apparently took 2 Xarelto today so bolus will be held and heparin drip will be started tomorrow.  ED Course as of 01/29/25 2331 Wed Jan 29, 2025 1917 Speaking with the patient he received 6 months worth of medication from his doctor today.  That is actually where he got the 2 doses of Xarelto that he took.  Waiting for blood work to return however on my most recent exam he had normal work of breathing and oxygenation.  He did have some wheezing but has a history of COPD so was ordered a DuoNeb breathing treatment.  If he has no sign of any heart strain and vital signs remained stable patient may be appropriate for discharge. [NICK]   1954 Patient's initial blood work shows a troponin of 89.  Normal metabolic's electrolytes.  With the elevated troponin I will discuss admission with the hospitalist. [NICK]   2109 Troponin T(!): 89.0  Troponin stable.  [NICK]   2109 I discussed admission with the patient.  Patient however is adamant that he does not want to be admitted.  His troponin is stable at 89.  He is not hypoxic, hypotensive, or any acute distress.  He is been sitting on the edge of the bed since being brought back.  He has 6 months worth of his anticoagulant.  He has a primary care physician whom he can follow-up closely with.  Patient will be discharged.  We gave him return precautions.  He expresses understanding. [NICK]      ED Course User Index  [NICK]

## 2025-01-29 NOTE — ED TRIAGE NOTES
Pt ambulatory unassisted to triage. Pt reports he was diagnosed with bilateral PE and R DVT. Complains of associated CP and SOB onset yesterday.

## 2025-01-30 LAB
EKG ATRIAL RATE: 109 BPM
EKG DIAGNOSIS: NORMAL
EKG P AXIS: 71 DEGREES
EKG P-R INTERVAL: 140 MS
EKG Q-T INTERVAL: 350 MS
EKG QRS DURATION: 88 MS
EKG QTC CALCULATION (BAZETT): 471 MS
EKG R AXIS: 190 DEGREES
EKG T AXIS: 46 DEGREES
EKG VENTRICULAR RATE: 109 BPM

## 2025-01-30 PROCEDURE — 93010 ELECTROCARDIOGRAM REPORT: CPT | Performed by: INTERNAL MEDICINE

## 2025-01-30 NOTE — ED NOTES
Patient mobility status  with no difficulty.     I have reviewed discharge instructions with the patient.  The patient verbalized understanding.    Patient left ED via Discharge Method: ambulatory to Home with friend    Opportunity for questions and clarification provided.     Patient given 0 scripts.           Umu Ramirez, RN  01/29/25 6524

## 2025-01-30 NOTE — DISCHARGE INSTRUCTIONS
You were evaluated today for your pulmonary embolism and your DVT.  Your oxygen has been normal since arrival here in the emergency department.  Your blood work showed an elevated troponin but this was stable on a repeat.  We discussed admitting you to the hospital for further monitoring to ensure that your troponin did not go higher as sometimes blood clots can cause strain on the heart.  You however was not in agreement with this plan.  He wanted to be discharged home.  I encourage you to please follow-up closely with your primary care physician.  You need to return to the emergency department immediately if your symptoms should worsen.

## 2025-04-05 ENCOUNTER — APPOINTMENT (OUTPATIENT)
Dept: CT IMAGING | Age: 45
DRG: 176 | End: 2025-04-05
Payer: COMMERCIAL

## 2025-04-05 ENCOUNTER — HOSPITAL ENCOUNTER (INPATIENT)
Age: 45
LOS: 1 days | Discharge: LEFT BEFORE DISCHARGE | DRG: 176 | End: 2025-04-06
Attending: EMERGENCY MEDICINE
Payer: COMMERCIAL

## 2025-04-05 ENCOUNTER — APPOINTMENT (OUTPATIENT)
Dept: GENERAL RADIOLOGY | Age: 45
DRG: 176 | End: 2025-04-05
Payer: COMMERCIAL

## 2025-04-05 DIAGNOSIS — I26.99 PULMONARY EMBOLISM WITHOUT ACUTE COR PULMONALE, UNSPECIFIED CHRONICITY, UNSPECIFIED PULMONARY EMBOLISM TYPE (HCC): Primary | ICD-10-CM

## 2025-04-05 DIAGNOSIS — R09.02 HYPOXIA: ICD-10-CM

## 2025-04-05 DIAGNOSIS — I26.99 PULMONARY EMBOLISM, UNSPECIFIED CHRONICITY, UNSPECIFIED PULMONARY EMBOLISM TYPE, UNSPECIFIED WHETHER ACUTE COR PULMONALE PRESENT (HCC): ICD-10-CM

## 2025-04-05 DIAGNOSIS — R07.9 CHEST PAIN IN ADULT: ICD-10-CM

## 2025-04-05 LAB
ALBUMIN SERPL-MCNC: 3.7 G/DL (ref 3.5–5)
ALBUMIN/GLOB SERPL: 1 (ref 1–1.9)
ALP SERPL-CCNC: 100 U/L (ref 40–129)
ALT SERPL-CCNC: 18 U/L (ref 8–55)
ANION GAP SERPL CALC-SCNC: 18 MMOL/L (ref 7–16)
APTT PPP: 28.8 SEC (ref 23.3–37.4)
AST SERPL-CCNC: 23 U/L (ref 15–37)
BASOPHILS # BLD: 0.12 K/UL (ref 0–0.2)
BASOPHILS NFR BLD: 0.8 % (ref 0–2)
BILIRUB SERPL-MCNC: 2.7 MG/DL (ref 0–1.2)
BUN SERPL-MCNC: 5 MG/DL (ref 6–23)
CALCIUM SERPL-MCNC: 9.2 MG/DL (ref 8.8–10.2)
CHLORIDE SERPL-SCNC: 100 MMOL/L (ref 98–107)
CO2 SERPL-SCNC: 19 MMOL/L (ref 20–29)
CREAT SERPL-MCNC: 0.81 MG/DL (ref 0.8–1.3)
DIFFERENTIAL METHOD BLD: ABNORMAL
EKG ATRIAL RATE: 117 BPM
EKG DIAGNOSIS: NORMAL
EKG P AXIS: 72 DEGREES
EKG P-R INTERVAL: 150 MS
EKG Q-T INTERVAL: 346 MS
EKG QRS DURATION: 82 MS
EKG QTC CALCULATION (BAZETT): 482 MS
EKG R AXIS: 204 DEGREES
EKG T AXIS: 18 DEGREES
EKG VENTRICULAR RATE: 117 BPM
EOSINOPHIL # BLD: 0.23 K/UL (ref 0–0.8)
EOSINOPHIL NFR BLD: 1.5 % (ref 0.5–7.8)
ERYTHROCYTE [DISTWIDTH] IN BLOOD BY AUTOMATED COUNT: 15.7 % (ref 11.9–14.6)
ETHANOL SERPL-MCNC: 61 MG/DL (ref 0–0.08)
GLOBULIN SER CALC-MCNC: 3.6 G/DL (ref 2.3–3.5)
GLUCOSE SERPL-MCNC: 122 MG/DL (ref 70–99)
HCT VFR BLD AUTO: 52.3 % (ref 41.1–50.3)
HGB BLD-MCNC: 19.2 G/DL (ref 13.6–17.2)
IMM GRANULOCYTES # BLD AUTO: 0.08 K/UL (ref 0–0.5)
IMM GRANULOCYTES NFR BLD AUTO: 0.5 % (ref 0–5)
INR PPP: 1.1
LIPASE SERPL-CCNC: 34 U/L (ref 13–60)
LYMPHOCYTES # BLD: 5.57 K/UL (ref 0.5–4.6)
LYMPHOCYTES NFR BLD: 36.2 % (ref 13–44)
MCH RBC QN AUTO: 37.5 PG (ref 26.1–32.9)
MCHC RBC AUTO-ENTMCNC: 36.7 G/DL (ref 31.4–35)
MCV RBC AUTO: 102.1 FL (ref 82–102)
MONOCYTES # BLD: 1.06 K/UL (ref 0.1–1.3)
MONOCYTES NFR BLD: 6.9 % (ref 4–12)
NEUTS SEG # BLD: 8.33 K/UL (ref 1.7–8.2)
NEUTS SEG NFR BLD: 54.1 % (ref 43–78)
NRBC # BLD: 0 K/UL (ref 0–0.2)
NT PRO BNP: 1118 PG/ML (ref 0–125)
PLATELET # BLD AUTO: 376 K/UL (ref 150–450)
PMV BLD AUTO: 8.6 FL (ref 9.4–12.3)
POTASSIUM SERPL-SCNC: 3.3 MMOL/L (ref 3.5–5.1)
PROCALCITONIN SERPL-MCNC: 0.08 NG/ML (ref 0–0.1)
PROT SERPL-MCNC: 7.3 G/DL (ref 6.3–8.2)
PROTHROMBIN TIME: 14.5 SEC (ref 11.3–14.9)
RBC # BLD AUTO: 5.12 M/UL (ref 4.23–5.6)
RBC MORPH BLD: ABNORMAL
SODIUM SERPL-SCNC: 138 MMOL/L (ref 136–145)
TROPONIN T SERPL HS-MCNC: 54.9 NG/L (ref 0–22)
TROPONIN T SERPL HS-MCNC: 56.8 NG/L (ref 0–22)
UFH PPP CHRO-ACNC: 0.64 IU/ML (ref 0.3–0.7)
WBC # BLD AUTO: 15.4 K/UL (ref 4.3–11.1)
WBC MORPH BLD: ABNORMAL

## 2025-04-05 PROCEDURE — 84484 ASSAY OF TROPONIN QUANT: CPT

## 2025-04-05 PROCEDURE — 6360000002 HC RX W HCPCS: Performed by: EMERGENCY MEDICINE

## 2025-04-05 PROCEDURE — 2580000003 HC RX 258: Performed by: EMERGENCY MEDICINE

## 2025-04-05 PROCEDURE — 99285 EMERGENCY DEPT VISIT HI MDM: CPT

## 2025-04-05 PROCEDURE — 2500000003 HC RX 250 WO HCPCS: Performed by: EMERGENCY MEDICINE

## 2025-04-05 PROCEDURE — 96376 TX/PRO/DX INJ SAME DRUG ADON: CPT

## 2025-04-05 PROCEDURE — 85025 COMPLETE CBC W/AUTO DIFF WBC: CPT

## 2025-04-05 PROCEDURE — 85610 PROTHROMBIN TIME: CPT

## 2025-04-05 PROCEDURE — 85730 THROMBOPLASTIN TIME PARTIAL: CPT

## 2025-04-05 PROCEDURE — 71045 X-RAY EXAM CHEST 1 VIEW: CPT

## 2025-04-05 PROCEDURE — 96375 TX/PRO/DX INJ NEW DRUG ADDON: CPT

## 2025-04-05 PROCEDURE — 71260 CT THORAX DX C+: CPT

## 2025-04-05 PROCEDURE — 96374 THER/PROPH/DIAG INJ IV PUSH: CPT

## 2025-04-05 PROCEDURE — 80053 COMPREHEN METABOLIC PANEL: CPT

## 2025-04-05 PROCEDURE — 85520 HEPARIN ASSAY: CPT

## 2025-04-05 PROCEDURE — 6360000004 HC RX CONTRAST MEDICATION: Performed by: EMERGENCY MEDICINE

## 2025-04-05 PROCEDURE — 93010 ELECTROCARDIOGRAM REPORT: CPT | Performed by: INTERNAL MEDICINE

## 2025-04-05 PROCEDURE — 1100000000 HC RM PRIVATE

## 2025-04-05 PROCEDURE — 6360000002 HC RX W HCPCS

## 2025-04-05 PROCEDURE — 83880 ASSAY OF NATRIURETIC PEPTIDE: CPT

## 2025-04-05 PROCEDURE — 93005 ELECTROCARDIOGRAM TRACING: CPT | Performed by: EMERGENCY MEDICINE

## 2025-04-05 PROCEDURE — 83690 ASSAY OF LIPASE: CPT

## 2025-04-05 PROCEDURE — 82077 ASSAY SPEC XCP UR&BREATH IA: CPT

## 2025-04-05 PROCEDURE — 84145 PROCALCITONIN (PCT): CPT

## 2025-04-05 RX ORDER — MAGNESIUM SULFATE IN WATER 40 MG/ML
2000 INJECTION, SOLUTION INTRAVENOUS PRN
Status: DISCONTINUED | OUTPATIENT
Start: 2025-04-05 | End: 2025-04-06 | Stop reason: HOSPADM

## 2025-04-05 RX ORDER — HEPARIN SODIUM 1000 [USP'U]/ML
80 INJECTION, SOLUTION INTRAVENOUS; SUBCUTANEOUS ONCE
Status: COMPLETED | OUTPATIENT
Start: 2025-04-05 | End: 2025-04-05

## 2025-04-05 RX ORDER — ONDANSETRON 2 MG/ML
4 INJECTION INTRAMUSCULAR; INTRAVENOUS EVERY 6 HOURS PRN
Status: DISCONTINUED | OUTPATIENT
Start: 2025-04-05 | End: 2025-04-06 | Stop reason: HOSPADM

## 2025-04-05 RX ORDER — LOSARTAN POTASSIUM 25 MG/1
25 TABLET ORAL DAILY
COMMUNITY

## 2025-04-05 RX ORDER — HEPARIN SODIUM 10000 [USP'U]/100ML
5-30 INJECTION, SOLUTION INTRAVENOUS CONTINUOUS
Status: DISCONTINUED | OUTPATIENT
Start: 2025-04-05 | End: 2025-04-06

## 2025-04-05 RX ORDER — HEPARIN SODIUM 1000 [USP'U]/ML
40 INJECTION, SOLUTION INTRAVENOUS; SUBCUTANEOUS PRN
Status: DISCONTINUED | OUTPATIENT
Start: 2025-04-05 | End: 2025-04-05 | Stop reason: SDUPTHER

## 2025-04-05 RX ORDER — MORPHINE SULFATE 2 MG/ML
2 INJECTION, SOLUTION INTRAMUSCULAR; INTRAVENOUS
Refills: 0 | Status: COMPLETED | OUTPATIENT
Start: 2025-04-05 | End: 2025-04-05

## 2025-04-05 RX ORDER — IOPAMIDOL 755 MG/ML
100 INJECTION, SOLUTION INTRAVASCULAR
Status: COMPLETED | OUTPATIENT
Start: 2025-04-05 | End: 2025-04-05

## 2025-04-05 RX ORDER — SODIUM CHLORIDE 9 MG/ML
INJECTION, SOLUTION INTRAVENOUS PRN
Status: DISCONTINUED | OUTPATIENT
Start: 2025-04-05 | End: 2025-04-06 | Stop reason: HOSPADM

## 2025-04-05 RX ORDER — HEPARIN SODIUM 1000 [USP'U]/ML
80 INJECTION, SOLUTION INTRAVENOUS; SUBCUTANEOUS PRN
Status: DISCONTINUED | OUTPATIENT
Start: 2025-04-05 | End: 2025-04-06

## 2025-04-05 RX ORDER — ONDANSETRON 4 MG/1
4 TABLET, ORALLY DISINTEGRATING ORAL EVERY 8 HOURS PRN
Status: DISCONTINUED | OUTPATIENT
Start: 2025-04-05 | End: 2025-04-06 | Stop reason: HOSPADM

## 2025-04-05 RX ORDER — MORPHINE SULFATE 4 MG/ML
4 INJECTION, SOLUTION INTRAMUSCULAR; INTRAVENOUS ONCE
Status: DISCONTINUED | OUTPATIENT
Start: 2025-04-05 | End: 2025-04-05

## 2025-04-05 RX ORDER — SODIUM CHLORIDE 0.9 % (FLUSH) 0.9 %
5-40 SYRINGE (ML) INJECTION PRN
Status: DISCONTINUED | OUTPATIENT
Start: 2025-04-05 | End: 2025-04-06 | Stop reason: HOSPADM

## 2025-04-05 RX ORDER — SODIUM CHLORIDE 0.9 % (FLUSH) 0.9 %
5-40 SYRINGE (ML) INJECTION EVERY 12 HOURS SCHEDULED
Status: DISCONTINUED | OUTPATIENT
Start: 2025-04-05 | End: 2025-04-06 | Stop reason: HOSPADM

## 2025-04-05 RX ORDER — HEPARIN SODIUM 1000 [USP'U]/ML
80 INJECTION, SOLUTION INTRAVENOUS; SUBCUTANEOUS ONCE
Status: DISCONTINUED | OUTPATIENT
Start: 2025-04-05 | End: 2025-04-05 | Stop reason: SDUPTHER

## 2025-04-05 RX ORDER — MORPHINE SULFATE 2 MG/ML
2 INJECTION, SOLUTION INTRAMUSCULAR; INTRAVENOUS ONCE
Status: COMPLETED | OUTPATIENT
Start: 2025-04-05 | End: 2025-04-05

## 2025-04-05 RX ORDER — HEPARIN SODIUM 1000 [USP'U]/ML
40 INJECTION, SOLUTION INTRAVENOUS; SUBCUTANEOUS PRN
Status: DISCONTINUED | OUTPATIENT
Start: 2025-04-05 | End: 2025-04-06

## 2025-04-05 RX ORDER — ACETAMINOPHEN 650 MG/1
650 SUPPOSITORY RECTAL EVERY 6 HOURS PRN
Status: DISCONTINUED | OUTPATIENT
Start: 2025-04-05 | End: 2025-04-06 | Stop reason: HOSPADM

## 2025-04-05 RX ORDER — MORPHINE SULFATE 2 MG/ML
2 INJECTION, SOLUTION INTRAMUSCULAR; INTRAVENOUS EVERY 4 HOURS PRN
Status: DISCONTINUED | OUTPATIENT
Start: 2025-04-05 | End: 2025-04-06 | Stop reason: HOSPADM

## 2025-04-05 RX ORDER — POTASSIUM CHLORIDE 1500 MG/1
40 TABLET, EXTENDED RELEASE ORAL PRN
Status: DISCONTINUED | OUTPATIENT
Start: 2025-04-05 | End: 2025-04-06 | Stop reason: HOSPADM

## 2025-04-05 RX ORDER — HEPARIN SODIUM 10000 [USP'U]/100ML
5-30 INJECTION, SOLUTION INTRAVENOUS CONTINUOUS
Status: DISCONTINUED | OUTPATIENT
Start: 2025-04-05 | End: 2025-04-05 | Stop reason: CLARIF

## 2025-04-05 RX ORDER — ONDANSETRON 2 MG/ML
4 INJECTION INTRAMUSCULAR; INTRAVENOUS ONCE
Status: COMPLETED | OUTPATIENT
Start: 2025-04-05 | End: 2025-04-05

## 2025-04-05 RX ORDER — POLYETHYLENE GLYCOL 3350 17 G/17G
17 POWDER, FOR SOLUTION ORAL DAILY PRN
Status: DISCONTINUED | OUTPATIENT
Start: 2025-04-05 | End: 2025-04-06 | Stop reason: HOSPADM

## 2025-04-05 RX ORDER — ACETAMINOPHEN 325 MG/1
650 TABLET ORAL EVERY 6 HOURS PRN
Status: DISCONTINUED | OUTPATIENT
Start: 2025-04-05 | End: 2025-04-06 | Stop reason: HOSPADM

## 2025-04-05 RX ORDER — POTASSIUM CHLORIDE 7.45 MG/ML
10 INJECTION INTRAVENOUS PRN
Status: DISCONTINUED | OUTPATIENT
Start: 2025-04-05 | End: 2025-04-06 | Stop reason: HOSPADM

## 2025-04-05 RX ORDER — HEPARIN SODIUM 1000 [USP'U]/ML
80 INJECTION, SOLUTION INTRAVENOUS; SUBCUTANEOUS PRN
Status: DISCONTINUED | OUTPATIENT
Start: 2025-04-05 | End: 2025-04-05 | Stop reason: SDUPTHER

## 2025-04-05 RX ADMIN — ONDANSETRON 4 MG: 2 INJECTION, SOLUTION INTRAMUSCULAR; INTRAVENOUS at 17:26

## 2025-04-05 RX ADMIN — MORPHINE SULFATE 2 MG: 2 INJECTION, SOLUTION INTRAMUSCULAR; INTRAVENOUS at 17:26

## 2025-04-05 RX ADMIN — HEPARIN SODIUM 18 UNITS/KG/HR: 10000 INJECTION, SOLUTION INTRAVENOUS at 23:20

## 2025-04-05 RX ADMIN — HEPARIN SODIUM 6500 UNITS: 1000 INJECTION INTRAVENOUS; SUBCUTANEOUS at 23:18

## 2025-04-05 RX ADMIN — MORPHINE SULFATE 2 MG: 2 INJECTION, SOLUTION INTRAMUSCULAR; INTRAVENOUS at 19:03

## 2025-04-05 RX ADMIN — IOPAMIDOL 100 ML: 755 INJECTION, SOLUTION INTRAVENOUS at 20:56

## 2025-04-05 RX ADMIN — SODIUM CHLORIDE 40 MG: 9 INJECTION, SOLUTION INTRAMUSCULAR; INTRAVENOUS; SUBCUTANEOUS at 17:26

## 2025-04-05 ASSESSMENT — PAIN SCALES - GENERAL
PAINLEVEL_OUTOF10: 8
PAINLEVEL_OUTOF10: 7
PAINLEVEL_OUTOF10: 10

## 2025-04-05 ASSESSMENT — PAIN - FUNCTIONAL ASSESSMENT: PAIN_FUNCTIONAL_ASSESSMENT: 0-10

## 2025-04-05 ASSESSMENT — PAIN DESCRIPTION - LOCATION: LOCATION: CHEST

## 2025-04-05 ASSESSMENT — PAIN DESCRIPTION - DESCRIPTORS: DESCRIPTORS: PRESSURE

## 2025-04-05 ASSESSMENT — LIFESTYLE VARIABLES
HOW OFTEN DO YOU HAVE A DRINK CONTAINING ALCOHOL: 2-3 TIMES A WEEK
HOW MANY STANDARD DRINKS CONTAINING ALCOHOL DO YOU HAVE ON A TYPICAL DAY: 3 OR 4

## 2025-04-05 NOTE — ED PROVIDER NOTES
degrees    R Axis 204 degrees    T Axis 18 degrees    Diagnosis       Sinus tachycardia with occasional Premature ventricular complexes  Right ventricular hypertrophy  Possible Lateral infarct , age undetermined  Inferior infarct (cited on or before 05-APR-2025)  Abnormal ECG  When compared with ECG of 29-JAN-2025 18:07,  Premature ventricular complexes are now Present  Confirmed by KATHLEEN ALFORD (), SARA LYON ([id number]) on 4/5/2025 8:47:41 PM        CT CHEST PULMONARY EMBOLISM W CONTRAST   Final Result   There is a new pulmonary embolism within the superior segment of the right lower   lobe however the appearance is chronic. This may be several days or weeks old.   Some of the previously seen emboli from CT on 1/29/2025 are resolved and some of   the previously seen emboli remain. Overall, the PE burden is small and there is   no evidence of right heart strain (RV-LV ratio less than 1).            Electronically signed by Tano Macias      XR CHEST PORTABLE   Final Result      1.  Mild perihilar pulmonary opacities, possibly due to pulmonary edema.         Electronically signed by Yarelis iNcolas                      Voice dictation software was used during the making of this note.  This software is not perfect and grammatical and other typographical errors may be present.  This note has not been completely proofread for errors.     Tano Del Rosario MD  04/05/25 7419

## 2025-04-05 NOTE — ED TRIAGE NOTES
Patient arrived with a  complaint of shortness of breath and chest pain.patient reports of symptoms since yesterday. Confirmed blood clots to bilateral legs and pulmonary region.

## 2025-04-06 VITALS
BODY MASS INDEX: 23.02 KG/M2 | HEIGHT: 76 IN | OXYGEN SATURATION: 92 % | WEIGHT: 189 LBS | SYSTOLIC BLOOD PRESSURE: 133 MMHG | DIASTOLIC BLOOD PRESSURE: 92 MMHG | TEMPERATURE: 97.9 F | HEART RATE: 78 BPM | RESPIRATION RATE: 16 BRPM

## 2025-04-06 DIAGNOSIS — I26.99 BILATERAL PULMONARY EMBOLISM (HCC): Primary | ICD-10-CM

## 2025-04-06 PROBLEM — R07.9 CHEST PAIN IN ADULT: Status: ACTIVE | Noted: 2025-04-06

## 2025-04-06 PROBLEM — I82.409 RECURRENT DEEP VEIN THROMBOSIS (DVT) (HCC): Status: ACTIVE | Noted: 2025-04-06

## 2025-04-06 PROBLEM — F17.200 SMOKER: Status: ACTIVE | Noted: 2025-04-06

## 2025-04-06 PROBLEM — D75.1 POLYCYTHEMIA: Status: ACTIVE | Noted: 2025-04-06

## 2025-04-06 LAB
AMPHET UR QL SCN: NEGATIVE
ANION GAP SERPL CALC-SCNC: 10 MMOL/L (ref 7–16)
APPEARANCE UR: CLEAR
APTT PPP: 58.4 SEC (ref 23.3–37.4)
APTT PPP: 75.7 SEC (ref 23.3–37.4)
BACTERIA URNS QL MICRO: ABNORMAL /HPF
BARBITURATES UR QL SCN: NEGATIVE
BENZODIAZ UR QL: NEGATIVE
BILIRUB UR QL: ABNORMAL
BUN SERPL-MCNC: 6 MG/DL (ref 6–23)
CALCIUM SERPL-MCNC: 9.1 MG/DL (ref 8.8–10.2)
CANNABINOIDS UR QL SCN: NEGATIVE
CHLORIDE SERPL-SCNC: 101 MMOL/L (ref 98–107)
CO2 SERPL-SCNC: 22 MMOL/L (ref 20–29)
COCAINE UR QL SCN: NEGATIVE
COLOR UR: ABNORMAL
CREAT SERPL-MCNC: 0.75 MG/DL (ref 0.8–1.3)
ERYTHROCYTE [DISTWIDTH] IN BLOOD BY AUTOMATED COUNT: 15.9 % (ref 11.9–14.6)
EST. AVERAGE GLUCOSE BLD GHB EST-MCNC: 93 MG/DL
GLUCOSE SERPL-MCNC: 105 MG/DL (ref 70–99)
GLUCOSE UR STRIP.AUTO-MCNC: NEGATIVE MG/DL
HBA1C MFR BLD: 4.9 % (ref 0–5.6)
HCT VFR BLD AUTO: 47.4 % (ref 41.1–50.3)
HGB BLD-MCNC: 17.7 G/DL (ref 13.6–17.2)
HGB UR QL STRIP: NEGATIVE
KETONES UR QL STRIP.AUTO: NEGATIVE MG/DL
LACTATE SERPL-SCNC: 2.4 MMOL/L (ref 0.5–2)
LEUKOCYTE ESTERASE UR QL STRIP.AUTO: ABNORMAL
MCH RBC QN AUTO: 37.8 PG (ref 26.1–32.9)
MCHC RBC AUTO-ENTMCNC: 37.3 G/DL (ref 31.4–35)
MCV RBC AUTO: 101.3 FL (ref 82–102)
METHADONE UR QL: NEGATIVE
NITRITE UR QL STRIP.AUTO: POSITIVE
NRBC # BLD: 0.03 K/UL (ref 0–0.2)
OPIATES UR QL: POSITIVE
OTHER OBSERVATIONS: ABNORMAL
PCP UR QL: NEGATIVE
PH UR STRIP: 5.5 (ref 5–9)
PLATELET # BLD AUTO: 302 K/UL (ref 150–450)
PMV BLD AUTO: 8.9 FL (ref 9.4–12.3)
POTASSIUM SERPL-SCNC: 4.4 MMOL/L (ref 3.5–5.1)
PROT UR STRIP-MCNC: 30 MG/DL
RBC # BLD AUTO: 4.68 M/UL (ref 4.23–5.6)
SODIUM SERPL-SCNC: 132 MMOL/L (ref 136–145)
SP GR UR REFRACTOMETRY: >1.035 (ref 1–1.02)
UROBILINOGEN UR QL STRIP.AUTO: 1 EU/DL (ref 0.2–1)
WBC # BLD AUTO: 13.9 K/UL (ref 4.3–11.1)
WBC URNS QL MICRO: ABNORMAL /HPF

## 2025-04-06 PROCEDURE — 99223 1ST HOSP IP/OBS HIGH 75: CPT | Performed by: INTERNAL MEDICINE

## 2025-04-06 PROCEDURE — 36415 COLL VENOUS BLD VENIPUNCTURE: CPT

## 2025-04-06 PROCEDURE — 6370000000 HC RX 637 (ALT 250 FOR IP)

## 2025-04-06 PROCEDURE — 80048 BASIC METABOLIC PNL TOTAL CA: CPT

## 2025-04-06 PROCEDURE — 83036 HEMOGLOBIN GLYCOSYLATED A1C: CPT

## 2025-04-06 PROCEDURE — APPSS45 APP SPLIT SHARED TIME 31-45 MINUTES: Performed by: NURSE PRACTITIONER

## 2025-04-06 PROCEDURE — 80307 DRUG TEST PRSMV CHEM ANLYZR: CPT

## 2025-04-06 PROCEDURE — 6370000000 HC RX 637 (ALT 250 FOR IP): Performed by: INTERNAL MEDICINE

## 2025-04-06 PROCEDURE — 85730 THROMBOPLASTIN TIME PARTIAL: CPT

## 2025-04-06 PROCEDURE — 6360000002 HC RX W HCPCS

## 2025-04-06 PROCEDURE — 2500000003 HC RX 250 WO HCPCS

## 2025-04-06 PROCEDURE — 83605 ASSAY OF LACTIC ACID: CPT

## 2025-04-06 PROCEDURE — 85027 COMPLETE CBC AUTOMATED: CPT

## 2025-04-06 PROCEDURE — 81001 URINALYSIS AUTO W/SCOPE: CPT

## 2025-04-06 RX ORDER — ASPIRIN 81 MG/1
81 TABLET, CHEWABLE ORAL DAILY
Status: DISCONTINUED | OUTPATIENT
Start: 2025-04-06 | End: 2025-04-06 | Stop reason: HOSPADM

## 2025-04-06 RX ORDER — LOSARTAN POTASSIUM 25 MG/1
25 TABLET ORAL DAILY
Status: DISCONTINUED | OUTPATIENT
Start: 2025-04-06 | End: 2025-04-06 | Stop reason: HOSPADM

## 2025-04-06 RX ADMIN — APIXABAN 5 MG: 5 TABLET, FILM COATED ORAL at 14:57

## 2025-04-06 RX ADMIN — LOSARTAN POTASSIUM 25 MG: 25 TABLET, FILM COATED ORAL at 07:44

## 2025-04-06 RX ADMIN — HEPARIN SODIUM 3300 UNITS: 1000 INJECTION INTRAVENOUS; SUBCUTANEOUS at 06:34

## 2025-04-06 RX ADMIN — POTASSIUM CHLORIDE 40 MEQ: 1500 TABLET, EXTENDED RELEASE ORAL at 00:53

## 2025-04-06 RX ADMIN — SODIUM CHLORIDE, PRESERVATIVE FREE 10 ML: 5 INJECTION INTRAVENOUS at 00:00

## 2025-04-06 RX ADMIN — ASPIRIN 81 MG: 81 TABLET, CHEWABLE ORAL at 07:44

## 2025-04-06 NOTE — ED NOTES
Walking desaturation study completed, patients oxygen saturation fell to 89% on room air while ambulating and patient became tachycardic with a max HR of 116. Patient stated that his chest pain became worse and he was lightheaded and increasingly short of breath. Dr. Del Rosario made aware of findings, will continue to closely monitor patient.      Brianne Golden, PETRA  04/05/25 7501

## 2025-04-06 NOTE — PROGRESS NOTES
TRANSFER - IN REPORT:    Verbal report received from PETRA Decker on José Antonio Kyle  being received from [facility] ED for routine progression of patient care      Report consisted of patient's Situation, Background, Assessment and   Recommendations(SBAR).     Information from the following report(s) ED SBAR, MAR, and Recent Results was reviewed with the receiving nurse.    Opportunity for questions and clarification was provided.      Assessment completed upon patient's arrival to unit and care assumed.

## 2025-04-06 NOTE — DISCHARGE SUMMARY
rivaroxaban (XARELTO) 15 MG TABS tablet Comments:   Reason for Stopping:         rivaroxaban (XARELTO) 20 MG TABS tablet Comments:   Reason for Stopping:         Multiple Vitamin (MULTIVITAMIN) TABS tablet Comments:   Reason for Stopping:               Some of the medications may be marked as \"stop taking\" by the system; but in reality patient or family reported already being off these meds; defer to outpatient/prescribing providers.      Procedures done this admission:  * No surgery found *    Consults this admission:  IP CONSULT TO HEMATOLOGY    Consultants will arrange their own follow ups, if applicable.    Echocardiogram results:  No results found for this or any previous visit.      Diagnostic Imaging/Tests:   CT CHEST PULMONARY EMBOLISM W CONTRAST  Result Date: 4/5/2025  There is a new pulmonary embolism within the superior segment of the right lower lobe however the appearance is chronic. This may be several days or weeks old. Some of the previously seen emboli from CT on 1/29/2025 are resolved and some of the previously seen emboli remain. Overall, the PE burden is small and there is no evidence of right heart strain (RV-LV ratio less than 1). Electronically signed by Tano Macias    XR CHEST PORTABLE  Result Date: 4/5/2025  1.  Mild perihilar pulmonary opacities, possibly due to pulmonary edema. Electronically signed by Yarelis Nicolas       Labs: Results:       BMP, Mg, Phos Recent Labs     04/05/25 1641 04/06/25  0554    132*   K 3.3* 4.4    101   CO2 19* 22   ANIONGAP 18* 10   BUN 5* 6   CREATININE 0.81 0.75*   LABGLOM >90 >90   CALCIUM 9.2 9.1   GLUCOSE 122* 105*      CBC Recent Labs     04/05/25  1641 04/06/25  0554   WBC 15.4* 13.9*   RBC 5.12 4.68   HGB 19.2* 17.7*   HCT 52.3* 47.4   .1* 101.3   MCH 37.5* 37.8*   MCHC 36.7* 37.3*   RDW 15.7* 15.9*    302   MPV 8.6* 8.9*   NRBC 0.00 0.03   LYMPHOPCT 36.2  --    MONOPCT 6.9  --    BASOPCT 0.8  --    IMMGRAN 0.5  --

## 2025-04-06 NOTE — PROGRESS NOTES
4 Eyes Skin Assessment     NAME:  José Antonio Kyle  YOB: 1980  MEDICAL RECORD NUMBER:  [id number]    The patient is being assessed for  Admission    I agree that at least one RN has performed a thorough Head to Toe Skin Assessment on the patient. ALL assessment sites listed below have been assessed.      Areas assessed by both nurses:    Head, Face, Ears, Shoulders, Back, Chest, Arms, Elbows, Hands, Sacrum. Buttock, Coccyx, Ischium, and Legs. Feet and Heels        Does the Patient have a Wound? No noted wound(s)       Rick Prevention initiated by RN: No  Wound Care Orders initiated by RN: No    Pressure Injury (Stage 3,4, Unstageable, DTI, NWPT, and Complex wounds) if present, place Wound referral order by RN under : No    New Ostomies, if present place, Ostomy referral order under : No     Nurse 1 eSignature: Electronically signed by MUNA DIGGS RN on 4/6/25 at 12:13 AM EDT    **SHARE this note so that the co-signing nurse can place an eSignature**    Nurse 2 eSignature: Electronically signed by Rell Oliveros RN on 4/6/25 at 12:14 AM EDT

## 2025-04-06 NOTE — CONSULTS
middle  lobe and right upper lobe. The previously seen acute emboli in the left lower  lobe segmental arteries are decreased from prior. One of the subsegmental emboli  seen previously is redemonstrated. No evidence of right heart strain. No  aggressive appearing osseous lesion.    Impression  There is a new pulmonary embolism within the superior segment of the right lower  lobe however the appearance is chronic. This may be several days or weeks old.  Some of the previously seen emboli from CT on 1/29/2025 are resolved and some of  the previously seen emboli remain. Overall, the PE burden is small and there is  no evidence of right heart strain (RV-LV ratio less than 1).        Electronically signed by Tano Macias        ASSESSMENT:      ICD-10-CM    1. Pulmonary embolism without acute cor pulmonale, unspecified chronicity, unspecified pulmonary embolism type (HCC)  I26.99       2. Hypoxia  R09.02       3. Chest pain in adult  R07.9       4. Pulmonary embolism, unspecified chronicity, unspecified pulmonary embolism type, unspecified whether acute cor pulmonale present (HCC)  I26.99 Echo (TTE) limited (PRN contrast/bubble/strain/3D)     Echo (TTE) limited (PRN contrast/bubble/strain/3D)          RECOMMENDATIONS:    Recurrent PE  - Has had multiple clotting events, including PE in 2020, PE/DVT in 2023 and 1/2025. Has been on and off Xarelto with some compliance issues. Recently restarted Xarelto in 1/2025 and now with some new clotting. Echo pending and currently on RA with small clot burden noted on imaging  - Previously saw Dr Hung in 2020 and had positive lupus anticoagulant but was on Xarelto and appears to have been lost to follow-up/confirmatory testing  - Will check Esoterix thrombosis panel  - Would recommend changing from heparin gtt to Eliquis due to BID dosing, when able  - Will arrange HFU with Dr Ruiz in 3-4 weeks    Polycythemia  - Currently smokes, denies testosterone/steroid use  - Hgb/Hct

## 2025-04-06 NOTE — H&P
Hospitalist History and Physical   Admit Date:  2025  4:40 PM   Name:  José Antonio Kyle   Age:  44 y.o.  Sex:  male  :  1980   MRN:  [id number]   Room:  ER06/    Presenting/Chief Complaint: Chest Pain     Reason(s) for Admission: Pulmonary embolism, unspecified chronicity, unspecified pulmonary embolism type, unspecified whether acute cor pulmonale present ([facility]) [I26.99]     History of Present Illness:   José Antonio Kyle is a 44 y.o. male with medical history of hereditary spherocytosis, recurrent DVT/PE, hypertension presented to the hospital complaining of chest pain, pleuritic in nature and shortness of breath.  CT PE showed new acute/subacute PE.  Troponin elevated to 56 and then 54.  proBNP elevated at 1100.  Also notably having anion gap metabolic acidosis with normal glucose.  Leukocytosis of 15.4.  Speaking with the patient, he adamantly denies any fevers or chills, coughing, wheezing, abdominal pain, nausea or vomiting, diarrhea or skin and soft tissue infections.  Heparin drip ordered in ER.  Hospital medicine consulted for admission.  Patient is adamant that he is compliant with Xarelto at home and only missing 1-2 doses over the past 3 months.  Looking at fill history he picked up 90-day supply back in 2025 which would suggest compliance      Assessment & Plan:     Recurrent PE  Likely attributed to patient's underlying hereditary spherocytosis/polycythemia.  Unclear if this is medication failure requiring warfarin/Lovenox or if switching to a anticoagulant with longer half-life would be of benefit for the patient  --continue heparin drip  - Will check TTE to evaluate for any right heart strain  - Monitor on telemetry  - Continue home aspirin    Hypertension  - Continue home losartan    Anion gap metabolic acidosis  -Check lactate  -Check urinalysis  - Trend bicarb and renal function    Leukocytosis  Denies any localizing infectious symptoms.  Likely reactive

## 2025-04-06 NOTE — ED NOTES
TRANSFER - OUT REPORT:    Verbal report given to 6th floor RN on José Antonio Kyle  being transferred to [facility] for routine progression of patient care       Report consisted of patient's Situation, Background, Assessment and   Recommendations(SBAR).     Information from the following report(s) Nurse Handoff Report was reviewed with the receiving nurse.    [place] Fall Assessment:    Presents to emergency department  because of falls (Syncope, seizure, or loss of consciousness): No  Age > 70: No  Altered Mental Status, Intoxication with alcohol or substance confusion (Disorientation, impaired judgment, poor safety awaremess, or inability to follow instructions): No  Impaired Mobility: Ambulates or transfers with assistive devices or assistance; Unable to ambulate or transer.: No  Nursing Judgement: Yes          Lines:   Peripheral IV 04/05/25 Right Antecubital (Active)   Site Assessment Clean, dry & intact 04/05/25 1642   Line Status Blood return noted 04/05/25 1642   Line Care Cap changed 04/05/25 1642   Phlebitis Assessment No symptoms 04/05/25 1642   Infiltration Assessment 0 04/05/25 1642        Opportunity for questions and clarification was provided.      Patient transported with:  Brianne Jeffries RN  04/05/25 8420

## 2025-04-07 DIAGNOSIS — I82.409 RECURRENT DEEP VEIN THROMBOSIS (DVT) (HCC): ICD-10-CM

## 2025-04-07 DIAGNOSIS — I26.99 BILATERAL PULMONARY EMBOLISM (HCC): Primary | ICD-10-CM

## 2025-04-07 DIAGNOSIS — D75.1 POLYCYTHEMIA: ICD-10-CM

## 2025-06-02 ENCOUNTER — APPOINTMENT (OUTPATIENT)
Dept: CT IMAGING | Age: 45
End: 2025-06-02
Payer: COMMERCIAL

## 2025-06-02 ENCOUNTER — APPOINTMENT (OUTPATIENT)
Dept: GENERAL RADIOLOGY | Age: 45
End: 2025-06-02
Payer: COMMERCIAL

## 2025-06-02 ENCOUNTER — HOSPITAL ENCOUNTER (OUTPATIENT)
Age: 45
Setting detail: OBSERVATION
Discharge: HOME | End: 2025-06-04
Attending: STUDENT IN AN ORGANIZED HEALTH CARE EDUCATION/TRAINING PROGRAM | Admitting: HOSPITALIST
Payer: COMMERCIAL

## 2025-06-02 DIAGNOSIS — R07.9 CHEST PAIN, UNSPECIFIED TYPE: Primary | ICD-10-CM

## 2025-06-02 DIAGNOSIS — I26.99 BILATERAL PULMONARY EMBOLISM (HCC): ICD-10-CM

## 2025-06-02 LAB
25(OH)D3 SERPL-MCNC: 20.7 NG/ML (ref 30–100)
ALBUMIN SERPL-MCNC: 3.3 G/DL (ref 3.5–5)
ALBUMIN/GLOB SERPL: 1 (ref 1–1.9)
ALP SERPL-CCNC: 81 U/L (ref 40–129)
ALT SERPL-CCNC: 26 U/L (ref 8–55)
ANION GAP SERPL CALC-SCNC: 13 MMOL/L (ref 7–16)
APTT PPP: 24.7 SEC (ref 23.3–37.4)
APTT PPP: 26.9 SEC (ref 23.3–37.4)
APTT PPP: 37.3 SEC (ref 23.3–37.4)
APTT PPP: 52 SEC (ref 23.3–37.4)
AST SERPL-CCNC: 36 U/L (ref 15–37)
BASOPHILS # BLD: 0.12 K/UL (ref 0–0.2)
BASOPHILS NFR BLD: 0.6 % (ref 0–2)
BILIRUB SERPL-MCNC: 6.2 MG/DL (ref 0–1.2)
BUN SERPL-MCNC: 9 MG/DL (ref 6–23)
CALCIUM SERPL-MCNC: 9.4 MG/DL (ref 8.8–10.2)
CHLORIDE SERPL-SCNC: 104 MMOL/L (ref 98–107)
CO2 SERPL-SCNC: 21 MMOL/L (ref 20–29)
CREAT SERPL-MCNC: 0.89 MG/DL (ref 0.8–1.3)
DIFFERENTIAL METHOD BLD: ABNORMAL
EOSINOPHIL # BLD: 0.06 K/UL (ref 0–0.8)
EOSINOPHIL NFR BLD: 0.3 % (ref 0.5–7.8)
ERYTHROCYTE [DISTWIDTH] IN BLOOD BY AUTOMATED COUNT: 15.2 % (ref 11.9–14.6)
ERYTHROCYTE [DISTWIDTH] IN BLOOD BY AUTOMATED COUNT: 15.3 % (ref 11.9–14.6)
ETHANOL SERPL-MCNC: <11 MG/DL (ref 0–0.08)
ETHANOL SERPL-MCNC: <11 MG/DL (ref 0–0.08)
FERRITIN SERPL-MCNC: 425 NG/ML (ref 8–388)
FOLATE SERPL-MCNC: 2.3 NG/ML (ref 3.1–17.5)
GLOBULIN SER CALC-MCNC: 3.3 G/DL (ref 2.3–3.5)
GLUCOSE SERPL-MCNC: 97 MG/DL (ref 70–99)
HCT VFR BLD AUTO: 51.3 % (ref 41.1–50.3)
HCT VFR BLD AUTO: 54.1 % (ref 41.1–50.3)
HGB BLD-MCNC: 18.4 G/DL (ref 13.6–17.2)
HGB BLD-MCNC: 19 G/DL (ref 13.6–17.2)
IMM GRANULOCYTES # BLD AUTO: 0.08 K/UL (ref 0–0.5)
IMM GRANULOCYTES NFR BLD AUTO: 0.4 % (ref 0–5)
INR PPP: 1.2
IRON SATN MFR SERPL: ABNORMAL % (ref 20–50)
IRON SERPL-MCNC: 275 UG/DL (ref 35–100)
LIPASE SERPL-CCNC: 16 U/L (ref 13–60)
LYMPHOCYTES # BLD: 2.23 K/UL (ref 0.5–4.6)
LYMPHOCYTES NFR BLD: 11 % (ref 13–44)
MAGNESIUM SERPL-MCNC: 1.6 MG/DL (ref 1.8–2.4)
MCH RBC QN AUTO: 39.3 PG (ref 26.1–32.9)
MCH RBC QN AUTO: 39.3 PG (ref 26.1–32.9)
MCHC RBC AUTO-ENTMCNC: 35.1 G/DL (ref 31.4–35)
MCHC RBC AUTO-ENTMCNC: 35.9 G/DL (ref 31.4–35)
MCV RBC AUTO: 109.6 FL (ref 82–102)
MCV RBC AUTO: 111.8 FL (ref 82–102)
MONOCYTES # BLD: 1.89 K/UL (ref 0.1–1.3)
MONOCYTES NFR BLD: 9.3 % (ref 4–12)
NEUTS SEG # BLD: 15.98 K/UL (ref 1.7–8.2)
NEUTS SEG NFR BLD: 78.4 % (ref 43–78)
NRBC # BLD: 0 K/UL (ref 0–0.2)
NRBC # BLD: 0 K/UL (ref 0–0.2)
NT PRO BNP: 2608 PG/ML (ref 0–125)
PLATELET # BLD AUTO: 348 K/UL (ref 150–450)
PLATELET # BLD AUTO: 358 K/UL (ref 150–450)
PMV BLD AUTO: 8.9 FL (ref 9.4–12.3)
PMV BLD AUTO: 9.3 FL (ref 9.4–12.3)
POTASSIUM SERPL-SCNC: 4 MMOL/L (ref 3.5–5.1)
PROT SERPL-MCNC: 6.5 G/DL (ref 6.3–8.2)
PROTHROMBIN TIME: 14.8 SEC (ref 11.3–14.9)
RBC # BLD AUTO: 4.68 M/UL (ref 4.23–5.6)
RBC # BLD AUTO: 4.84 M/UL (ref 4.23–5.6)
SODIUM SERPL-SCNC: 138 MMOL/L (ref 136–145)
TIBC SERPL-MCNC: ABNORMAL UG/DL (ref 240–450)
TROPONIN T SERPL HS-MCNC: 91.4 NG/L (ref 0–22)
TROPONIN T SERPL HS-MCNC: 96.5 NG/L (ref 0–22)
UFH PPP CHRO-ACNC: <0.1 IU/ML (ref 0.3–0.7)
UIBC SERPL-MCNC: <16.8 UG/DL (ref 112–347)
VIT B12 SERPL-MCNC: 480 PG/ML (ref 193–986)
WBC # BLD AUTO: 19.4 K/UL (ref 4.3–11.1)
WBC # BLD AUTO: 20.4 K/UL (ref 4.3–11.1)

## 2025-06-02 PROCEDURE — 96367 TX/PROPH/DG ADDL SEQ IV INF: CPT

## 2025-06-02 PROCEDURE — 83735 ASSAY OF MAGNESIUM: CPT

## 2025-06-02 PROCEDURE — 85520 HEPARIN ASSAY: CPT

## 2025-06-02 PROCEDURE — 85307 ASSAY ACTIVATED PROTEIN C: CPT

## 2025-06-02 PROCEDURE — 83880 ASSAY OF NATRIURETIC PEPTIDE: CPT

## 2025-06-02 PROCEDURE — 85306 CLOT INHIBIT PROT S FREE: CPT

## 2025-06-02 PROCEDURE — 96365 THER/PROPH/DIAG IV INF INIT: CPT

## 2025-06-02 PROCEDURE — 85613 RUSSELL VIPER VENOM DILUTED: CPT

## 2025-06-02 PROCEDURE — 2500000003 HC RX 250 WO HCPCS: Performed by: HOSPITALIST

## 2025-06-02 PROCEDURE — 85300 ANTITHROMBIN III ACTIVITY: CPT

## 2025-06-02 PROCEDURE — 82607 VITAMIN B-12: CPT

## 2025-06-02 PROCEDURE — 85025 COMPLETE CBC W/AUTO DIFF WBC: CPT

## 2025-06-02 PROCEDURE — 96368 THER/DIAG CONCURRENT INF: CPT

## 2025-06-02 PROCEDURE — 80053 COMPREHEN METABOLIC PANEL: CPT

## 2025-06-02 PROCEDURE — 96366 THER/PROPH/DIAG IV INF ADDON: CPT

## 2025-06-02 PROCEDURE — 85598 HEXAGNAL PHOSPH PLTLT NEUTRL: CPT

## 2025-06-02 PROCEDURE — 71045 X-RAY EXAM CHEST 1 VIEW: CPT

## 2025-06-02 PROCEDURE — 83090 ASSAY OF HOMOCYSTEINE: CPT

## 2025-06-02 PROCEDURE — 2580000003 HC RX 258: Performed by: HOSPITALIST

## 2025-06-02 PROCEDURE — 85732 THROMBOPLASTIN TIME PARTIAL: CPT

## 2025-06-02 PROCEDURE — 85303 CLOT INHIBIT PROT C ACTIVITY: CPT

## 2025-06-02 PROCEDURE — 96375 TX/PRO/DX INJ NEW DRUG ADDON: CPT

## 2025-06-02 PROCEDURE — 6370000000 HC RX 637 (ALT 250 FOR IP): Performed by: HOSPITALIST

## 2025-06-02 PROCEDURE — 84484 ASSAY OF TROPONIN QUANT: CPT

## 2025-06-02 PROCEDURE — 85610 PROTHROMBIN TIME: CPT

## 2025-06-02 PROCEDURE — 82306 VITAMIN D 25 HYDROXY: CPT

## 2025-06-02 PROCEDURE — 99285 EMERGENCY DEPT VISIT HI MDM: CPT

## 2025-06-02 PROCEDURE — 94640 AIRWAY INHALATION TREATMENT: CPT

## 2025-06-02 PROCEDURE — 6360000004 HC RX CONTRAST MEDICATION: Performed by: STUDENT IN AN ORGANIZED HEALTH CARE EDUCATION/TRAINING PROGRAM

## 2025-06-02 PROCEDURE — G0378 HOSPITAL OBSERVATION PER HR: HCPCS

## 2025-06-02 PROCEDURE — 83540 ASSAY OF IRON: CPT

## 2025-06-02 PROCEDURE — 85240 CLOT FACTOR VIII AHG 1 STAGE: CPT

## 2025-06-02 PROCEDURE — 96376 TX/PRO/DX INJ SAME DRUG ADON: CPT

## 2025-06-02 PROCEDURE — 82746 ASSAY OF FOLIC ACID SERUM: CPT

## 2025-06-02 PROCEDURE — 71260 CT THORAX DX C+: CPT

## 2025-06-02 PROCEDURE — 36415 COLL VENOUS BLD VENIPUNCTURE: CPT

## 2025-06-02 PROCEDURE — 81240 F2 GENE: CPT

## 2025-06-02 PROCEDURE — 83550 IRON BINDING TEST: CPT

## 2025-06-02 PROCEDURE — 82728 ASSAY OF FERRITIN: CPT

## 2025-06-02 PROCEDURE — 6360000002 HC RX W HCPCS: Performed by: HOSPITALIST

## 2025-06-02 PROCEDURE — 94760 N-INVAS EAR/PLS OXIMETRY 1: CPT

## 2025-06-02 PROCEDURE — 86147 CARDIOLIPIN ANTIBODY EA IG: CPT

## 2025-06-02 PROCEDURE — 6360000002 HC RX W HCPCS: Performed by: STUDENT IN AN ORGANIZED HEALTH CARE EDUCATION/TRAINING PROGRAM

## 2025-06-02 PROCEDURE — 85027 COMPLETE CBC AUTOMATED: CPT

## 2025-06-02 PROCEDURE — 85730 THROMBOPLASTIN TIME PARTIAL: CPT

## 2025-06-02 PROCEDURE — 82077 ASSAY SPEC XCP UR&BREATH IA: CPT

## 2025-06-02 PROCEDURE — 83690 ASSAY OF LIPASE: CPT

## 2025-06-02 PROCEDURE — 86146 BETA-2 GLYCOPROTEIN ANTIBODY: CPT

## 2025-06-02 RX ORDER — LANOLIN ALCOHOL/MO/W.PET/CERES
100 CREAM (GRAM) TOPICAL DAILY
Status: DISCONTINUED | OUTPATIENT
Start: 2025-06-02 | End: 2025-06-04 | Stop reason: HOSPADM

## 2025-06-02 RX ORDER — ONDANSETRON 2 MG/ML
4 INJECTION INTRAMUSCULAR; INTRAVENOUS EVERY 4 HOURS PRN
Status: DISCONTINUED | OUTPATIENT
Start: 2025-06-02 | End: 2025-06-04 | Stop reason: HOSPADM

## 2025-06-02 RX ORDER — HEPARIN SODIUM 1000 [USP'U]/ML
80 INJECTION, SOLUTION INTRAVENOUS; SUBCUTANEOUS ONCE
Status: DISCONTINUED | OUTPATIENT
Start: 2025-06-02 | End: 2025-06-02

## 2025-06-02 RX ORDER — ONDANSETRON 2 MG/ML
4 INJECTION INTRAMUSCULAR; INTRAVENOUS
Status: COMPLETED | OUTPATIENT
Start: 2025-06-02 | End: 2025-06-02

## 2025-06-02 RX ORDER — ATORVASTATIN CALCIUM 80 MG/1
80 TABLET, FILM COATED ORAL NIGHTLY
Status: DISCONTINUED | OUTPATIENT
Start: 2025-06-02 | End: 2025-06-04 | Stop reason: HOSPADM

## 2025-06-02 RX ORDER — POTASSIUM CHLORIDE 1500 MG/1
40 TABLET, EXTENDED RELEASE ORAL PRN
Status: DISCONTINUED | OUTPATIENT
Start: 2025-06-02 | End: 2025-06-04 | Stop reason: HOSPADM

## 2025-06-02 RX ORDER — POLYETHYLENE GLYCOL 3350 17 G/17G
17 POWDER, FOR SOLUTION ORAL DAILY PRN
Status: DISCONTINUED | OUTPATIENT
Start: 2025-06-02 | End: 2025-06-04 | Stop reason: HOSPADM

## 2025-06-02 RX ORDER — PROMETHAZINE HYDROCHLORIDE 25 MG/1
25 TABLET ORAL EVERY 6 HOURS PRN
Status: DISCONTINUED | OUTPATIENT
Start: 2025-06-02 | End: 2025-06-04 | Stop reason: HOSPADM

## 2025-06-02 RX ORDER — OXYCODONE HYDROCHLORIDE 5 MG/1
5 TABLET ORAL EVERY 4 HOURS PRN
Refills: 0 | Status: DISCONTINUED | OUTPATIENT
Start: 2025-06-02 | End: 2025-06-04 | Stop reason: HOSPADM

## 2025-06-02 RX ORDER — SODIUM CHLORIDE 0.9 % (FLUSH) 0.9 %
5-40 SYRINGE (ML) INJECTION PRN
Status: DISCONTINUED | OUTPATIENT
Start: 2025-06-02 | End: 2025-06-04 | Stop reason: HOSPADM

## 2025-06-02 RX ORDER — HEPARIN SODIUM 1000 [USP'U]/ML
40 INJECTION, SOLUTION INTRAVENOUS; SUBCUTANEOUS PRN
Status: DISPENSED | OUTPATIENT
Start: 2025-06-02 | End: 2025-06-03

## 2025-06-02 RX ORDER — ACETAMINOPHEN 650 MG/1
650 SUPPOSITORY RECTAL EVERY 6 HOURS PRN
Status: DISCONTINUED | OUTPATIENT
Start: 2025-06-02 | End: 2025-06-04 | Stop reason: HOSPADM

## 2025-06-02 RX ORDER — SODIUM CHLORIDE 0.9 % (FLUSH) 0.9 %
5-40 SYRINGE (ML) INJECTION EVERY 12 HOURS SCHEDULED
Status: DISCONTINUED | OUTPATIENT
Start: 2025-06-02 | End: 2025-06-04 | Stop reason: HOSPADM

## 2025-06-02 RX ORDER — HEPARIN SODIUM 1000 [USP'U]/ML
80 INJECTION, SOLUTION INTRAVENOUS; SUBCUTANEOUS PRN
Status: DISCONTINUED | OUTPATIENT
Start: 2025-06-02 | End: 2025-06-02

## 2025-06-02 RX ORDER — POTASSIUM CHLORIDE 7.45 MG/ML
10 INJECTION INTRAVENOUS PRN
Status: DISCONTINUED | OUTPATIENT
Start: 2025-06-02 | End: 2025-06-04 | Stop reason: HOSPADM

## 2025-06-02 RX ORDER — HEPARIN SODIUM 10000 [USP'U]/100ML
5-30 INJECTION, SOLUTION INTRAVENOUS CONTINUOUS
Status: DISCONTINUED | OUTPATIENT
Start: 2025-06-02 | End: 2025-06-02

## 2025-06-02 RX ORDER — ACETAMINOPHEN 325 MG/1
650 TABLET ORAL EVERY 6 HOURS PRN
Status: DISCONTINUED | OUTPATIENT
Start: 2025-06-02 | End: 2025-06-04 | Stop reason: HOSPADM

## 2025-06-02 RX ORDER — IOPAMIDOL 755 MG/ML
75 INJECTION, SOLUTION INTRAVASCULAR
Status: COMPLETED | OUTPATIENT
Start: 2025-06-02 | End: 2025-06-02

## 2025-06-02 RX ORDER — MAGNESIUM SULFATE IN WATER 40 MG/ML
2000 INJECTION, SOLUTION INTRAVENOUS PRN
Status: DISCONTINUED | OUTPATIENT
Start: 2025-06-02 | End: 2025-06-04 | Stop reason: HOSPADM

## 2025-06-02 RX ORDER — ARFORMOTEROL TARTRATE 15 UG/2ML
15 SOLUTION RESPIRATORY (INHALATION)
Status: DISCONTINUED | OUTPATIENT
Start: 2025-06-02 | End: 2025-06-04 | Stop reason: HOSPADM

## 2025-06-02 RX ORDER — HYDROMORPHONE HYDROCHLORIDE 1 MG/ML
1 INJECTION, SOLUTION INTRAMUSCULAR; INTRAVENOUS; SUBCUTANEOUS EVERY 4 HOURS PRN
Status: DISCONTINUED | OUTPATIENT
Start: 2025-06-02 | End: 2025-06-04 | Stop reason: HOSPADM

## 2025-06-02 RX ORDER — SODIUM CHLORIDE 9 MG/ML
INJECTION, SOLUTION INTRAVENOUS PRN
Status: DISCONTINUED | OUTPATIENT
Start: 2025-06-02 | End: 2025-06-04 | Stop reason: HOSPADM

## 2025-06-02 RX ORDER — IPRATROPIUM BROMIDE AND ALBUTEROL SULFATE 2.5; .5 MG/3ML; MG/3ML
1 SOLUTION RESPIRATORY (INHALATION) EVERY 4 HOURS PRN
Status: DISCONTINUED | OUTPATIENT
Start: 2025-06-02 | End: 2025-06-04 | Stop reason: HOSPADM

## 2025-06-02 RX ORDER — MAGNESIUM SULFATE IN WATER 40 MG/ML
2000 INJECTION, SOLUTION INTRAVENOUS ONCE
Status: COMPLETED | OUTPATIENT
Start: 2025-06-02 | End: 2025-06-02

## 2025-06-02 RX ORDER — MORPHINE SULFATE 4 MG/ML
4 INJECTION, SOLUTION INTRAMUSCULAR; INTRAVENOUS
Status: COMPLETED | OUTPATIENT
Start: 2025-06-02 | End: 2025-06-02

## 2025-06-02 RX ORDER — SENNA AND DOCUSATE SODIUM 50; 8.6 MG/1; MG/1
1 TABLET, FILM COATED ORAL 2 TIMES DAILY
Status: DISCONTINUED | OUTPATIENT
Start: 2025-06-02 | End: 2025-06-04 | Stop reason: HOSPADM

## 2025-06-02 RX ORDER — HEPARIN SODIUM 1000 [USP'U]/ML
40 INJECTION, SOLUTION INTRAVENOUS; SUBCUTANEOUS PRN
Status: DISCONTINUED | OUTPATIENT
Start: 2025-06-02 | End: 2025-06-02

## 2025-06-02 RX ORDER — HEPARIN SODIUM 1000 [USP'U]/ML
80 INJECTION, SOLUTION INTRAVENOUS; SUBCUTANEOUS PRN
Status: DISPENSED | OUTPATIENT
Start: 2025-06-02 | End: 2025-06-03

## 2025-06-02 RX ORDER — NICOTINE 21 MG/24HR
1 PATCH, TRANSDERMAL 24 HOURS TRANSDERMAL DAILY
Status: DISCONTINUED | OUTPATIENT
Start: 2025-06-02 | End: 2025-06-04 | Stop reason: HOSPADM

## 2025-06-02 RX ORDER — BUDESONIDE 0.5 MG/2ML
0.5 INHALANT ORAL
Status: DISCONTINUED | OUTPATIENT
Start: 2025-06-02 | End: 2025-06-04 | Stop reason: HOSPADM

## 2025-06-02 RX ORDER — HEPARIN SODIUM 10000 [USP'U]/100ML
5-30 INJECTION, SOLUTION INTRAVENOUS CONTINUOUS
Status: DISPENSED | OUTPATIENT
Start: 2025-06-02 | End: 2025-06-03

## 2025-06-02 RX ORDER — LOSARTAN POTASSIUM 25 MG/1
25 TABLET ORAL DAILY
Status: DISCONTINUED | OUTPATIENT
Start: 2025-06-02 | End: 2025-06-04 | Stop reason: HOSPADM

## 2025-06-02 RX ORDER — ASPIRIN 81 MG/1
81 TABLET, CHEWABLE ORAL DAILY
Status: DISCONTINUED | OUTPATIENT
Start: 2025-06-02 | End: 2025-06-04 | Stop reason: HOSPADM

## 2025-06-02 RX ORDER — HEPARIN SODIUM 1000 [USP'U]/ML
80 INJECTION, SOLUTION INTRAVENOUS; SUBCUTANEOUS ONCE
Status: COMPLETED | OUTPATIENT
Start: 2025-06-02 | End: 2025-06-02

## 2025-06-02 RX ADMIN — Medication 3 MG: at 21:36

## 2025-06-02 RX ADMIN — HEPARIN SODIUM 18 UNITS/KG/HR: 10000 INJECTION, SOLUTION INTRAVENOUS at 15:02

## 2025-06-02 RX ADMIN — BUDESONIDE INHALATION 500 MCG: 0.5 SUSPENSION RESPIRATORY (INHALATION) at 19:05

## 2025-06-02 RX ADMIN — MAGNESIUM SULFATE HEPTAHYDRATE 2000 MG: 40 INJECTION, SOLUTION INTRAVENOUS at 14:53

## 2025-06-02 RX ADMIN — HYDROMORPHONE HYDROCHLORIDE 1 MG: 1 INJECTION, SOLUTION INTRAMUSCULAR; INTRAVENOUS; SUBCUTANEOUS at 22:45

## 2025-06-02 RX ADMIN — HYDROMORPHONE HYDROCHLORIDE 1 MG: 1 INJECTION, SOLUTION INTRAMUSCULAR; INTRAVENOUS; SUBCUTANEOUS at 18:33

## 2025-06-02 RX ADMIN — ATORVASTATIN CALCIUM 80 MG: 80 TABLET, FILM COATED ORAL at 21:36

## 2025-06-02 RX ADMIN — MORPHINE SULFATE 4 MG: 4 INJECTION INTRAVENOUS at 10:59

## 2025-06-02 RX ADMIN — HEPARIN SODIUM 5800 UNITS: 1000 INJECTION INTRAVENOUS; SUBCUTANEOUS at 14:49

## 2025-06-02 RX ADMIN — SODIUM CHLORIDE, PRESERVATIVE FREE 10 ML: 5 INJECTION INTRAVENOUS at 21:40

## 2025-06-02 RX ADMIN — ONDANSETRON 4 MG: 2 INJECTION, SOLUTION INTRAMUSCULAR; INTRAVENOUS at 10:58

## 2025-06-02 RX ADMIN — LOSARTAN POTASSIUM 25 MG: 25 TABLET, FILM COATED ORAL at 14:23

## 2025-06-02 RX ADMIN — DOCUSATE SODIUM 50 MG AND SENNOSIDES 8.6 MG 1 TABLET: 8.6; 5 TABLET, FILM COATED ORAL at 14:23

## 2025-06-02 RX ADMIN — DOCUSATE SODIUM 50 MG AND SENNOSIDES 8.6 MG 1 TABLET: 8.6; 5 TABLET, FILM COATED ORAL at 21:36

## 2025-06-02 RX ADMIN — IOPAMIDOL 75 ML: 755 INJECTION, SOLUTION INTRAVENOUS at 11:15

## 2025-06-02 RX ADMIN — MAGNESIUM SULFATE HEPTAHYDRATE 2000 MG: 40 INJECTION, SOLUTION INTRAVENOUS at 11:06

## 2025-06-02 RX ADMIN — Medication 100 MG: at 14:23

## 2025-06-02 RX ADMIN — FAMOTIDINE 20 MG: 10 INJECTION, SOLUTION INTRAVENOUS at 21:36

## 2025-06-02 RX ADMIN — FAMOTIDINE 20 MG: 10 INJECTION, SOLUTION INTRAVENOUS at 14:24

## 2025-06-02 RX ADMIN — ARFORMOTEROL TARTRATE 15 MCG: 15 SOLUTION RESPIRATORY (INHALATION) at 19:05

## 2025-06-02 RX ADMIN — HEPARIN SODIUM 5800 UNITS: 1000 INJECTION INTRAVENOUS; SUBCUTANEOUS at 22:36

## 2025-06-02 RX ADMIN — ASPIRIN 81 MG: 81 TABLET, CHEWABLE ORAL at 14:23

## 2025-06-02 ASSESSMENT — PAIN SCALES - GENERAL
PAINLEVEL_OUTOF10: 9
PAINLEVEL_OUTOF10: 9
PAINLEVEL_OUTOF10: 5
PAINLEVEL_OUTOF10: 0
PAINLEVEL_OUTOF10: 0
PAINLEVEL_OUTOF10: 10

## 2025-06-02 ASSESSMENT — PAIN DESCRIPTION - LOCATION
LOCATION: FLANK
LOCATION: OTHER (COMMENT)
LOCATION: BACK

## 2025-06-02 ASSESSMENT — LIFESTYLE VARIABLES
HOW MANY STANDARD DRINKS CONTAINING ALCOHOL DO YOU HAVE ON A TYPICAL DAY: 7 TO 9
HOW OFTEN DO YOU HAVE A DRINK CONTAINING ALCOHOL: 2-3 TIMES A WEEK

## 2025-06-02 ASSESSMENT — PAIN - FUNCTIONAL ASSESSMENT: PAIN_FUNCTIONAL_ASSESSMENT: 0-10

## 2025-06-02 ASSESSMENT — PAIN DESCRIPTION - ORIENTATION
ORIENTATION: MID
ORIENTATION: UPPER
ORIENTATION: RIGHT

## 2025-06-02 ASSESSMENT — PAIN DESCRIPTION - DESCRIPTORS
DESCRIPTORS: ACHING
DESCRIPTORS: ACHING;SQUEEZING

## 2025-06-02 ASSESSMENT — PAIN SCALES - WONG BAKER: WONGBAKER_NUMERICALRESPONSE: NO HURT

## 2025-06-02 ASSESSMENT — PAIN DESCRIPTION - PAIN TYPE: TYPE: ACUTE PAIN

## 2025-06-02 NOTE — PROGRESS NOTES
4 Eyes Skin Assessment     NAME:  José Antonio Kyle  YOB: 1980  MEDICAL RECORD NUMBER:  [id number]    The patient is being assessed for  Admission    I agree that at least one RN has performed a thorough Head to Toe Skin Assessment on the patient. ALL assessment sites listed below have been assessed.      Areas assessed by both nurses:    Head, Face, Ears, Shoulders, Back, Chest, Arms, Elbows, Hands, Sacrum. Buttock, Coccyx, Ischium, and Legs. Feet and Heels. Patient is missing part of 2 fingers on right hand and has surgical scar on L abdomen/flank area from having spleen removed. Also surgical scar on R ankle from repair of fractured ankle. Numerous tattoos scattered on body.        Does the Patient have a Wound? No noted wound(s)       Rick Prevention initiated by RN: Yes  Wound Care Orders initiated by RN: No    Pressure Injury (Stage 3,4, Unstageable, DTI, NWPT, and Complex wounds) if present, place Wound referral order by RN under : No    New Ostomies, if present place, Ostomy referral order under : No     Nurse 1 eSignature: Electronically signed by MIGUELANGEL LINARES RN on 6/2/25 at 5:02 PM EDT    **SHARE this note so that the co-signing nurse can place an eSignature**    Nurse 2 eSignature: Electronically signed by DEVAN ANGULO RN on 6/2/25 at 5:28 PM EDT

## 2025-06-02 NOTE — ED TRIAGE NOTES
Pt 45 y.o male arrives via "[facility] from home with a complaint of dyspnea upon exertion. Pt states symptoms started yesterday but got worse today. Pt also reports chest pain that started today, pt was given 1 nitro and 324mg of aspirin in transport. Pt reports a history of 4 MI's and blood clots in lungs pt also has a history of COPD.

## 2025-06-02 NOTE — H&P
solid mass seen. Cholelithiasis. Osseous structures: Spine degenerative changes seen. Additional Information: None     New right lower lobe and left upper lobe small bilateral pulmonary emboli. Remaining bilateral pulmonary emboli appears similar to previous study. No CT evidence of right ventricular strain. Chronic findings. Communication:  I communicated impression statement by secure messaging to HESHAM ACOSTA with read receipt on 6/2/2025 12:04 PM. Electronically signed by JESUS ALBERTO LOPES MD    XR CHEST PORTABLE  Result Date: 6/2/2025  Chest X-ray INDICATION: Chest pain. COMPARISON: April 5, 2025. TECHNIQUE: AP/PA view of the chest was obtained. FINDINGS: The lungs are clear. There are no infiltrates or effusions. No pneumothorax noted. The heart size is normal.  The bony thorax is intact.      No acute findings in the chest. Electronically signed by Soren Gan MD        Signed:  Azalia Gudino MD    Part of this note may have been written by using a voice dictation software.  The note has been proof read but may still contain some grammatical/other typographical errors.

## 2025-06-02 NOTE — ED PROVIDER NOTES
Emergency Department Provider Note       [facility] EMERGENCY DEPT   PCP: Skyler Sage Jr., MD   Age: 45 y.o.   Sex: male     DISPOSITION Admitted 06/02/2025 12:25:28 PM    ICD-10-CM    1. Chest pain, unspecified type  R07.9       2. Bilateral pulmonary embolism (HCC)  I26.99           Medical Decision Making     45-year-old male patient with complicated history including multiple pulmonary emboli despite anticoagulant use, previous heart attack presenting this department with reports of chest pain, nausea vomiting lower epigastric pain and flank discomfort.  Symptoms present progressive for the past 24 hours.  Reports similar symptoms in the past with PE diagnosis.  He reports compliance with Xarelto.  Orders placed for broad-based workup to include single view chest x-ray, will require CT imaging of the chest to further assess the cause of his pain given his history of PE  ED Course as of 06/02/25 1254   Mon Jun 02, 2025   0944 EKG interpretation: Sinus rhythm, rate of 91, rightward axis, inversion of the T wave segment in lead III, aVF, V3.  This appears consistent with prior tracing from 1/29/2025 [BR]   1105 Elevation patient's troponin, consistent with prior values, will trend this result.  No definite ischemia on patient's EKG [BR]   1158 Repeat troponin improved from previous, BNP significantly elevated with comparison to previous [BR]      ED Course User Index  [BR] Joao Nguyen R, DO     1 or more acute illnesses that pose a threat to life or bodily function.   1 or more chronic illnesses with a severe exacerbation or progression.  Drug therapy given requiring intensive monitoring for toxicity.  Discussion with external consultants.  Chronic medical problems impacting care include recurrent PE.  Shared medical decision making was utilized in creating the patients health plan today.  I independently ordered and reviewed each unique test.    I reviewed external records: ED visit note from a different ED.

## 2025-06-02 NOTE — ED NOTES
TRANSFER - OUT REPORT:    Verbal report given to PETRA Concepcion on José Antonio Kyle  being transferred to [facility] for routine progression of patient care       Report consisted of patient's Situation, Background, Assessment and   Recommendations(SBAR).     Information from the following report(s) ED Encounter Summary and ED SBAR was reviewed with the receiving nurse.    [place] Fall Assessment:    Presents to emergency department  because of falls (Syncope, seizure, or loss of consciousness): No  Age > 70: No  Altered Mental Status, Intoxication with alcohol or substance confusion (Disorientation, impaired judgment, poor safety awaremess, or inability to follow instructions): No  Impaired Mobility: Ambulates or transfers with assistive devices or assistance; Unable to ambulate or transer.: No             Lines:   Peripheral IV 06/02/25 Left Antecubital (Active)        Opportunity for questions and clarification was provided.      Patient transported with:  Leona Roche RN  06/02/25 7602     Detail Level: Detailed Detail Level: Generalized Detail Level: Zone

## 2025-06-02 NOTE — PROGRESS NOTES
TRANSFER - IN REPORT:    Verbal report received from PETRA Delgadillo on José Antonio Kyle  being received from ED for routine progression of patient care      Report consisted of patient's Situation, Background, Assessment and   Recommendations(SBAR).     Information from the following report(s) Nurse Handoff Report was reviewed with the receiving nurse.    Opportunity for questions and clarification was provided.      Assessment completed upon patient's arrival to unit and care assumed.

## 2025-06-02 NOTE — PROGRESS NOTES
TRANSFER - IN REPORT:    Verbal report received from RN on José Antonio Kyle  being received from ED for routine progression of patient care      Report consisted of patient's Situation, Background, Assessment and   Recommendations(SBAR).     Information from the following report(s) Nurse Handoff Report was reviewed with the receiving nurse.    Opportunity for questions and clarification was provided.      Assessment completed upon patient's arrival to unit and care assumed.

## 2025-06-03 ENCOUNTER — APPOINTMENT (OUTPATIENT)
Dept: NUCLEAR MEDICINE | Age: 45
End: 2025-06-03
Payer: COMMERCIAL

## 2025-06-03 DIAGNOSIS — I26.99 BILATERAL PULMONARY EMBOLISM (HCC): Primary | ICD-10-CM

## 2025-06-03 PROBLEM — R07.9 CHEST PAIN: Status: ACTIVE | Noted: 2025-06-03

## 2025-06-03 LAB
ANION GAP SERPL CALC-SCNC: 9 MMOL/L (ref 7–16)
APTT PPP: 52.8 SEC (ref 23.3–37.4)
APTT PPP: 70.7 SEC (ref 23.3–37.4)
APTT PPP: 73.8 SEC (ref 23.3–37.4)
BASOPHILS # BLD: 0.16 K/UL (ref 0–0.2)
BASOPHILS NFR BLD: 0.9 % (ref 0–2)
BUN SERPL-MCNC: 10 MG/DL (ref 6–23)
CALCIUM SERPL-MCNC: 8.7 MG/DL (ref 8.8–10.2)
CHLORIDE SERPL-SCNC: 101 MMOL/L (ref 98–107)
CO2 SERPL-SCNC: 23 MMOL/L (ref 20–29)
CREAT SERPL-MCNC: 0.87 MG/DL (ref 0.8–1.3)
DIFFERENTIAL METHOD BLD: ABNORMAL
EOSINOPHIL # BLD: 0.2 K/UL (ref 0–0.8)
EOSINOPHIL NFR BLD: 1.1 % (ref 0.5–7.8)
ERYTHROCYTE [DISTWIDTH] IN BLOOD BY AUTOMATED COUNT: 15.3 % (ref 11.9–14.6)
GLUCOSE SERPL-MCNC: 98 MG/DL (ref 70–99)
HCT VFR BLD AUTO: 47.9 % (ref 41.1–50.3)
HGB BLD-MCNC: 16.8 G/DL (ref 13.6–17.2)
IMM GRANULOCYTES # BLD AUTO: 0.09 K/UL (ref 0–0.5)
IMM GRANULOCYTES NFR BLD AUTO: 0.5 % (ref 0–5)
LYMPHOCYTES # BLD: 4.2 K/UL (ref 0.5–4.6)
LYMPHOCYTES NFR BLD: 24.1 % (ref 13–44)
MCH RBC QN AUTO: 39.5 PG (ref 26.1–32.9)
MCHC RBC AUTO-ENTMCNC: 35.1 G/DL (ref 31.4–35)
MCV RBC AUTO: 112.7 FL (ref 82–102)
MONOCYTES # BLD: 1.67 K/UL (ref 0.1–1.3)
MONOCYTES NFR BLD: 9.6 % (ref 4–12)
NEUTS SEG # BLD: 11.08 K/UL (ref 1.7–8.2)
NEUTS SEG NFR BLD: 63.8 % (ref 43–78)
NRBC # BLD: 0.02 K/UL (ref 0–0.2)
PLATELET # BLD AUTO: 306 K/UL (ref 150–450)
PMV BLD AUTO: 9.7 FL (ref 9.4–12.3)
POTASSIUM SERPL-SCNC: 3.9 MMOL/L (ref 3.5–5.1)
RBC # BLD AUTO: 4.25 M/UL (ref 4.23–5.6)
SODIUM SERPL-SCNC: 134 MMOL/L (ref 136–145)
WBC # BLD AUTO: 17.4 K/UL (ref 4.3–11.1)

## 2025-06-03 PROCEDURE — 96376 TX/PRO/DX INJ SAME DRUG ADON: CPT

## 2025-06-03 PROCEDURE — 94640 AIRWAY INHALATION TREATMENT: CPT

## 2025-06-03 PROCEDURE — A9539 TC99M PENTETATE: HCPCS | Performed by: NURSE PRACTITIONER

## 2025-06-03 PROCEDURE — 85730 THROMBOPLASTIN TIME PARTIAL: CPT

## 2025-06-03 PROCEDURE — G0378 HOSPITAL OBSERVATION PER HR: HCPCS

## 2025-06-03 PROCEDURE — 2580000003 HC RX 258: Performed by: HOSPITALIST

## 2025-06-03 PROCEDURE — A9540 TC99M MAA: HCPCS | Performed by: NURSE PRACTITIONER

## 2025-06-03 PROCEDURE — 6360000002 HC RX W HCPCS: Performed by: HOSPITALIST

## 2025-06-03 PROCEDURE — APPSS60 APP SPLIT SHARED TIME 46-60 MINUTES: Performed by: NURSE PRACTITIONER

## 2025-06-03 PROCEDURE — 6370000000 HC RX 637 (ALT 250 FOR IP): Performed by: HOSPITALIST

## 2025-06-03 PROCEDURE — 85025 COMPLETE CBC W/AUTO DIFF WBC: CPT

## 2025-06-03 PROCEDURE — 6370000000 HC RX 637 (ALT 250 FOR IP): Performed by: NURSE PRACTITIONER

## 2025-06-03 PROCEDURE — 78582 LUNG VENTILAT&PERFUS IMAGING: CPT

## 2025-06-03 PROCEDURE — 36415 COLL VENOUS BLD VENIPUNCTURE: CPT

## 2025-06-03 PROCEDURE — 2500000003 HC RX 250 WO HCPCS: Performed by: HOSPITALIST

## 2025-06-03 PROCEDURE — 3430000000 HC RX DIAGNOSTIC RADIOPHARMACEUTICAL: Performed by: NURSE PRACTITIONER

## 2025-06-03 PROCEDURE — 99223 1ST HOSP IP/OBS HIGH 75: CPT | Performed by: INTERNAL MEDICINE

## 2025-06-03 PROCEDURE — 94760 N-INVAS EAR/PLS OXIMETRY 1: CPT

## 2025-06-03 PROCEDURE — 80048 BASIC METABOLIC PNL TOTAL CA: CPT

## 2025-06-03 PROCEDURE — 96366 THER/PROPH/DIAG IV INF ADDON: CPT

## 2025-06-03 RX ORDER — FOLIC ACID 1 MG/1
1 TABLET ORAL DAILY
Status: DISCONTINUED | OUTPATIENT
Start: 2025-06-03 | End: 2025-06-04 | Stop reason: HOSPADM

## 2025-06-03 RX ORDER — KIT FOR THE PREPARATION OF TECHNETIUM TC 99M PENTETATE 20 MG/1
38 INJECTION, POWDER, LYOPHILIZED, FOR SOLUTION INTRAVENOUS; RESPIRATORY (INHALATION)
Status: COMPLETED | OUTPATIENT
Start: 2025-06-03 | End: 2025-06-03

## 2025-06-03 RX ADMIN — ATORVASTATIN CALCIUM 80 MG: 80 TABLET, FILM COATED ORAL at 21:30

## 2025-06-03 RX ADMIN — HEPARIN SODIUM 2900 UNITS: 1000 INJECTION INTRAVENOUS; SUBCUTANEOUS at 06:20

## 2025-06-03 RX ADMIN — ARFORMOTEROL TARTRATE 15 MCG: 15 SOLUTION RESPIRATORY (INHALATION) at 08:57

## 2025-06-03 RX ADMIN — DOCUSATE SODIUM 50 MG AND SENNOSIDES 8.6 MG 1 TABLET: 8.6; 5 TABLET, FILM COATED ORAL at 21:30

## 2025-06-03 RX ADMIN — BUDESONIDE INHALATION 500 MCG: 0.5 SUSPENSION RESPIRATORY (INHALATION) at 19:22

## 2025-06-03 RX ADMIN — FOLIC ACID 1 MG: 1 TABLET ORAL at 14:34

## 2025-06-03 RX ADMIN — HEPARIN SODIUM 24 UNITS/KG/HR: 10000 INJECTION, SOLUTION INTRAVENOUS at 07:14

## 2025-06-03 RX ADMIN — LOSARTAN POTASSIUM 25 MG: 25 TABLET, FILM COATED ORAL at 08:38

## 2025-06-03 RX ADMIN — ARFORMOTEROL TARTRATE 15 MCG: 15 SOLUTION RESPIRATORY (INHALATION) at 19:22

## 2025-06-03 RX ADMIN — BUDESONIDE INHALATION 500 MCG: 0.5 SUSPENSION RESPIRATORY (INHALATION) at 08:57

## 2025-06-03 RX ADMIN — HYDROMORPHONE HYDROCHLORIDE 1 MG: 1 INJECTION, SOLUTION INTRAMUSCULAR; INTRAVENOUS; SUBCUTANEOUS at 13:41

## 2025-06-03 RX ADMIN — HEPARIN SODIUM 2900 UNITS: 1000 INJECTION INTRAVENOUS; SUBCUTANEOUS at 14:33

## 2025-06-03 RX ADMIN — APIXABAN 10 MG: 5 TABLET, FILM COATED ORAL at 21:30

## 2025-06-03 RX ADMIN — KIT FOR THE PREPARATION OF TECHNETIUM TC 99M PENTETATE 38 MILLICURIE: 20 INJECTION, POWDER, LYOPHILIZED, FOR SOLUTION INTRAVENOUS; RESPIRATORY (INHALATION) at 12:00

## 2025-06-03 RX ADMIN — FAMOTIDINE 20 MG: 10 INJECTION, SOLUTION INTRAVENOUS at 08:38

## 2025-06-03 RX ADMIN — ASPIRIN 81 MG: 81 TABLET, CHEWABLE ORAL at 08:38

## 2025-06-03 RX ADMIN — HYDROMORPHONE HYDROCHLORIDE 1 MG: 1 INJECTION, SOLUTION INTRAMUSCULAR; INTRAVENOUS; SUBCUTANEOUS at 08:33

## 2025-06-03 RX ADMIN — FAMOTIDINE 20 MG: 10 INJECTION, SOLUTION INTRAVENOUS at 21:31

## 2025-06-03 RX ADMIN — SODIUM CHLORIDE, PRESERVATIVE FREE 10 ML: 5 INJECTION INTRAVENOUS at 21:36

## 2025-06-03 RX ADMIN — KIT FOR THE PREPARATION OF TECHNETIUM TC 99M ALBUMIN AGGREGATED 6 MILLICURIE: 2.5 INJECTION, POWDER, FOR SOLUTION INTRAVENOUS at 12:20

## 2025-06-03 RX ADMIN — Medication 100 MG: at 08:38

## 2025-06-03 RX ADMIN — Medication 3 MG: at 21:30

## 2025-06-03 RX ADMIN — HYDROMORPHONE HYDROCHLORIDE 1 MG: 1 INJECTION, SOLUTION INTRAMUSCULAR; INTRAVENOUS; SUBCUTANEOUS at 19:34

## 2025-06-03 ASSESSMENT — PAIN DESCRIPTION - ORIENTATION
ORIENTATION: LEFT
ORIENTATION: UPPER

## 2025-06-03 ASSESSMENT — PAIN SCALES - GENERAL
PAINLEVEL_OUTOF10: 9
PAINLEVEL_OUTOF10: 0
PAINLEVEL_OUTOF10: 9
PAINLEVEL_OUTOF10: 8
PAINLEVEL_OUTOF10: 0

## 2025-06-03 ASSESSMENT — PAIN DESCRIPTION - LOCATION
LOCATION: CHEST
LOCATION: ABDOMEN;BACK
LOCATION: CHEST;BACK

## 2025-06-03 ASSESSMENT — PAIN DESCRIPTION - DESCRIPTORS
DESCRIPTORS: SHARP;STABBING
DESCRIPTORS: ACHING
DESCRIPTORS: ACHING;STABBING;SQUEEZING

## 2025-06-03 ASSESSMENT — PAIN SCALES - WONG BAKER
WONGBAKER_NUMERICALRESPONSE: NO HURT
WONGBAKER_NUMERICALRESPONSE: NO HURT

## 2025-06-03 NOTE — PROGRESS NOTES
SPIRITUAL HEALTH  Note for Med/Surg Visit                  Room # 208/01    Name: José Antonio Kyle           Age: 45 y.o.    Gender: male          MRN: [id number]  Buddhist: Church       Preferred Language: English    Date: 06/03/25  Visit Time: Begin Time: (P) 0900 End Time : (P) 0910 Complexity of Encounter: (P) Low      Visit Summary:  met with patient through regular rounds.  He had no visitors at the time.  rounded with intern, who led the interaction and provided care. provided active listening, discussion of illness, space for expression of feelings and spiritual support.  will follow up as necessary or at patient's request.       Referral/Consult From: (P) Rounding  Encounter Overview/Reason: (P) Spiritual/Emotional Needs  Encounter Code:     Crisis (if applicable):    Service Provided For: (P) Patient     Patient was available.    Val, Belief, Meaning:   Patient unable to assess at this time  Family/Friends No family/friends present  Rituals (if applicable)      Importance and Influence:  Patient unable to assess at this time  Family/Friends No family/friends present    Community:  Patient   unable to assess at this time   Family/Friends   No family/ friends present.    Assessment and Plan of Care:   Emotions Expressed by Patient:   Assessment: (P) Calm    Interventions by :   Intervention: (P) Active listening, Sustaining Presence/Ministry of presence, Explored/Affirmed feelings, thoughts, concerns     Result/ Response by Patient:   Outcome: (P) Acceptance, Comfort    Patient Plan of Care:   Plan and Referrals  Plan/Referrals: (P) Continue Support (comment)     Electronically signed by SUSANNA Quach, on 6/3/2025 at 12:14 PM.  [facility]  [facility]  [phone]

## 2025-06-03 NOTE — PROGRESS NOTES
Writer called Pharmacy, [facility], to confirm Heparin drip to stop and Eliquis to start this evening regardless of the APTT results, we are to proceed with bridging.

## 2025-06-03 NOTE — PLAN OF CARE
Problem: Discharge Planning  Goal: Discharge to home or other facility with appropriate resources  6/3/2025 1046 by Yumiko Gerard RN  Outcome: Progressing  6/3/2025 0226 by Neelam Gerard RN  Outcome: Progressing     Problem: Safety - Adult  Goal: Free from fall injury  6/3/2025 1046 by Yumiko Gerard RN  Outcome: Progressing  6/3/2025 0226 by Neelam Gerard RN  Outcome: Progressing     Problem: Pain  Goal: Verbalizes/displays adequate comfort level or baseline comfort level  Outcome: Progressing

## 2025-06-03 NOTE — CONSULTS
SEC   APTT    Collection Time: 06/02/25  9:11 PM   Result Value Ref Range    APTT 37.3 23.3 - 37.4 SEC   APTT    Collection Time: 06/03/25  4:54 AM   Result Value Ref Range    APTT 52.8 (H) 23.3 - 37.4 SEC   CBC with Auto Differential    Collection Time: 06/03/25  4:54 AM   Result Value Ref Range    WBC 17.4 (H) 4.3 - 11.1 K/uL    RBC 4.25 4.23 - 5.6 M/uL    Hemoglobin 16.8 13.6 - 17.2 g/dL    Hematocrit 47.9 41.1 - 50.3 %    .7 (H) 82 - 102 FL    MCH 39.5 (H) 26.1 - 32.9 PG    MCHC 35.1 (H) 31.4 - 35.0 g/dL    RDW 15.3 (H) 11.9 - 14.6 %    Platelets 306 150 - 450 K/uL    MPV 9.7 9.4 - 12.3 FL    nRBC 0.02 0.0 - 0.2 K/uL    Differential Type AUTOMATED      Neutrophils % 63.8 43.0 - 78.0 %    Lymphocytes % 24.1 13.0 - 44.0 %    Monocytes % 9.6 4.0 - 12.0 %    Eosinophils % 1.1 0.5 - 7.8 %    Basophils % 0.9 0.0 - 2.0 %    Immature Granulocytes % 0.5 0.0 - 5.0 %    Neutrophils Absolute 11.08 (H) 1.70 - 8.20 K/UL    Lymphocytes Absolute 4.20 0.50 - 4.60 K/UL    Monocytes Absolute 1.67 (H) 0.10 - 1.30 K/UL    Eosinophils Absolute 0.20 0.00 - 0.80 K/UL    Basophils Absolute 0.16 0.00 - 0.20 K/UL    Immature Granulocytes Absolute 0.09 0.0 - 0.5 K/UL   Basic Metabolic Panel w/ Reflex to MG    Collection Time: 06/03/25  4:54 AM   Result Value Ref Range    Sodium 134 (L) 136 - 145 mmol/L    Potassium 3.9 3.5 - 5.1 mmol/L    Chloride 101 98 - 107 mmol/L    CO2 23 20 - 29 mmol/L    Anion Gap 9 7 - 16 mmol/L    Glucose 98 70 - 99 mg/dL    BUN 10 6 - 23 MG/DL    Creatinine 0.87 0.80 - 1.30 MG/DL    Est, Glom Filt Rate >90 >60 ml/min/1.73m2    Calcium 8.7 (L) 8.8 - 10.2 MG/DL       Imaging:    CT Result (most recent):  CT CHEST PULMONARY EMBOLISM W CONTRAST 06/02/2025    Narrative  CT CHEST PULMONARY EMBOLISM W CONTRAST - 6/2/2025 11:20 AM - [id number]    INDICATION: CP, sob, hx of clot.    COMPARISON: 6/2/2025 chest radiograph 4/5/2025 CTA chest.    TECHNIQUE:  Multiple axial images were obtained through the chest

## 2025-06-03 NOTE — PROGRESS NOTES
Hospitalist Progress Note   Admit Date:  2025  9:32 AM   Name:  José Antonio Kyle   Age:  45 y.o.  Sex:  male  :  1980   MRN:  [id number]   Room:      Presenting/Chief Complaint: Shortness of Breath and Chest Pain     Reason(s) for Admission: Recurrent pulmonary embolism (HCC) [I26.99]  Bilateral pulmonary embolism (HCC) [I26.99]  Chest pain, unspecified type [R07.9]     Hospital Course:     José Antonio Kyle is a 45 y.o. male with medical history of possible lupus anticoagulant failed outpatient follow-up with hematology, possible hereditary spherocytosis, recurrent DVT and PEs currently on Xarelto, ongoing alcohol abuse.  Pt  presented to the ER  with chief complaints of shortness of breath and chest discomfort x 48 hours. Pt describes significant dyspnea on exertion and central chest and back pain.  Chest CT with new right lower lobe and left upper lobe small bilateral pulmonary emboli remaining bilateral pulmonary emboli appear similar to previous study.  Echo does not show any heart strain. No hypoxia noted while inpatient. Of note pt reports compliance with Xarelto since it was initiated about 2 months ago.  Prior to this was on Eliquis but was inconsistent due to price.     Subjective & 24hr Events:     Pt with SOB.  Noted while he is talking during my assessment. Back pain persists.      He reports he has not seen a hematologist in about 30 years.     Assessment & Plan:     Recurrent pulmonary embolism (HCC)  - Cont Heparin gtt   - Hematology consult pending   - Supplemental oxygen as needed to titrate for SpO2 greater 92%.  - Pain control with as needed Dilaudid and Roxicodone.  - Incentive spirometry ordered.  - As needed DuoNebs ordered  - Needs to establish with hematology for outpatient management     HTN  - BP well controlled on home med Cozaar     Hx ETOH abuse  - Cont thiamine   - No signs of acute withdrawal     Tobacco abuse  - Cessation recommended

## 2025-06-03 NOTE — CARE COORDINATION
RNCM met with patient in room 208 to discuss discharge planning.    CM assessment completed and documented in flowsheet. Patient is independent at baseline and works full time. Patient has no current DME or home care services. Demographics and PCP verified. CM following for discharge needs.       06/03/25 1002   Service Assessment   Patient Orientation Alert and Oriented   Cognition Alert   History Provided By Patient   Primary Caregiver Self   Accompanied By/Relationship n/a   Support Systems Family Members   Patient's Healthcare Decision Maker is: Legal Next of Kin   PCP Verified by CM Yes   Last Visit to PCP Within last year   Prior Functional Level Independent in ADLs/IADLs   Current Functional Level Independent in ADLs/IADLs   Can patient return to prior living arrangement Yes   Ability to make needs known: Good   Family able to assist with home care needs: Yes   Would you like for me to discuss the discharge plan with any other family members/significant others, and if so, who? No   Financial Resources Other (Comment)  (commercial insurance)   Community Resources None   Social/Functional History   Prior Level of Assist for ADLs Independent   Ambulation Assistance Independent   Prior Level of Assist for Transfers Independent   Active  Yes   Occupation Full time employment   Discharge Planning   Current Services Prior To Admission None   Potential Assistance Needed N/A   DME Ordered? No   Potential Assistance Purchasing Medications No

## 2025-06-03 NOTE — PLAN OF CARE
Problem: Discharge Planning  Goal: Discharge to home or other facility with appropriate resources  6/3/2025 0226 by Neelam Gerard, RN  Outcome: Progressing  6/2/2025 1639 by Carlene Funk RN  Outcome: Progressing     Problem: Safety - Adult  Goal: Free from fall injury  Outcome: Progressing

## 2025-06-04 VITALS
OXYGEN SATURATION: 99 % | HEIGHT: 76 IN | RESPIRATION RATE: 18 BRPM | HEART RATE: 89 BPM | BODY MASS INDEX: 19.48 KG/M2 | TEMPERATURE: 97.3 F | WEIGHT: 160 LBS | DIASTOLIC BLOOD PRESSURE: 70 MMHG | SYSTOLIC BLOOD PRESSURE: 107 MMHG

## 2025-06-04 DIAGNOSIS — J44.9 CHRONIC OBSTRUCTIVE PULMONARY DISEASE, UNSPECIFIED COPD TYPE (HCC): Primary | ICD-10-CM

## 2025-06-04 LAB
ERYTHROCYTE [DISTWIDTH] IN BLOOD BY AUTOMATED COUNT: 15.4 % (ref 11.9–14.6)
HCT VFR BLD AUTO: 43.2 % (ref 41.1–50.3)
HGB BLD-MCNC: 15.8 G/DL (ref 13.6–17.2)
MCH RBC QN AUTO: 39.7 PG (ref 26.1–32.9)
MCHC RBC AUTO-ENTMCNC: 36.6 G/DL (ref 31.4–35)
MCV RBC AUTO: 108.5 FL (ref 82–102)
NRBC # BLD: 0.02 K/UL (ref 0–0.2)
PLATELET # BLD AUTO: 284 K/UL (ref 150–450)
PMV BLD AUTO: 9.2 FL (ref 9.4–12.3)
RBC # BLD AUTO: 3.98 M/UL (ref 4.23–5.6)
WBC # BLD AUTO: 15.9 K/UL (ref 4.3–11.1)

## 2025-06-04 PROCEDURE — 6370000000 HC RX 637 (ALT 250 FOR IP): Performed by: NURSE PRACTITIONER

## 2025-06-04 PROCEDURE — G0378 HOSPITAL OBSERVATION PER HR: HCPCS

## 2025-06-04 PROCEDURE — APPSS45 APP SPLIT SHARED TIME 31-45 MINUTES: Performed by: NURSE PRACTITIONER

## 2025-06-04 PROCEDURE — 36415 COLL VENOUS BLD VENIPUNCTURE: CPT

## 2025-06-04 PROCEDURE — 85027 COMPLETE CBC AUTOMATED: CPT

## 2025-06-04 PROCEDURE — 94640 AIRWAY INHALATION TREATMENT: CPT

## 2025-06-04 PROCEDURE — 2500000003 HC RX 250 WO HCPCS: Performed by: HOSPITALIST

## 2025-06-04 PROCEDURE — 6370000000 HC RX 637 (ALT 250 FOR IP): Performed by: HOSPITALIST

## 2025-06-04 PROCEDURE — 2580000003 HC RX 258: Performed by: HOSPITALIST

## 2025-06-04 PROCEDURE — 99232 SBSQ HOSP IP/OBS MODERATE 35: CPT | Performed by: INTERNAL MEDICINE

## 2025-06-04 PROCEDURE — 96376 TX/PRO/DX INJ SAME DRUG ADON: CPT

## 2025-06-04 PROCEDURE — 94761 N-INVAS EAR/PLS OXIMETRY MLT: CPT

## 2025-06-04 PROCEDURE — 6360000002 HC RX W HCPCS: Performed by: HOSPITALIST

## 2025-06-04 RX ADMIN — DOCUSATE SODIUM 50 MG AND SENNOSIDES 8.6 MG 1 TABLET: 8.6; 5 TABLET, FILM COATED ORAL at 08:37

## 2025-06-04 RX ADMIN — APIXABAN 10 MG: 5 TABLET, FILM COATED ORAL at 08:38

## 2025-06-04 RX ADMIN — FAMOTIDINE 20 MG: 10 INJECTION, SOLUTION INTRAVENOUS at 08:38

## 2025-06-04 RX ADMIN — FOLIC ACID 1 MG: 1 TABLET ORAL at 08:38

## 2025-06-04 RX ADMIN — Medication 100 MG: at 08:38

## 2025-06-04 RX ADMIN — ARFORMOTEROL TARTRATE 15 MCG: 15 SOLUTION RESPIRATORY (INHALATION) at 08:06

## 2025-06-04 RX ADMIN — BUDESONIDE INHALATION 500 MCG: 0.5 SUSPENSION RESPIRATORY (INHALATION) at 08:06

## 2025-06-04 RX ADMIN — LOSARTAN POTASSIUM 25 MG: 25 TABLET, FILM COATED ORAL at 08:38

## 2025-06-04 RX ADMIN — HYDROMORPHONE HYDROCHLORIDE 1 MG: 1 INJECTION, SOLUTION INTRAMUSCULAR; INTRAVENOUS; SUBCUTANEOUS at 08:58

## 2025-06-04 RX ADMIN — SODIUM CHLORIDE, PRESERVATIVE FREE 10 ML: 5 INJECTION INTRAVENOUS at 08:41

## 2025-06-04 RX ADMIN — HYDROMORPHONE HYDROCHLORIDE 1 MG: 1 INJECTION, SOLUTION INTRAMUSCULAR; INTRAVENOUS; SUBCUTANEOUS at 02:41

## 2025-06-04 RX ADMIN — ASPIRIN 81 MG: 81 TABLET, CHEWABLE ORAL at 08:38

## 2025-06-04 ASSESSMENT — PAIN DESCRIPTION - ORIENTATION
ORIENTATION: LEFT
ORIENTATION: UPPER

## 2025-06-04 ASSESSMENT — PAIN SCALES - GENERAL
PAINLEVEL_OUTOF10: 9
PAINLEVEL_OUTOF10: 9
PAINLEVEL_OUTOF10: 0
PAINLEVEL_OUTOF10: 9
PAINLEVEL_OUTOF10: 0

## 2025-06-04 ASSESSMENT — PAIN DESCRIPTION - DESCRIPTORS
DESCRIPTORS: ACHING;STABBING;SQUEEZING
DESCRIPTORS: STABBING

## 2025-06-04 ASSESSMENT — PAIN SCALES - WONG BAKER: WONGBAKER_NUMERICALRESPONSE: NO HURT

## 2025-06-04 ASSESSMENT — PAIN DESCRIPTION - LOCATION
LOCATION: CHEST
LOCATION: BACK

## 2025-06-04 NOTE — PLAN OF CARE
Problem: Discharge Planning  Goal: Discharge to home or other facility with appropriate resources  6/4/2025 1108 by Yumiko Gerard RN  Outcome: Progressing  6/3/2025 2314 by Neelam Gerard RN  Outcome: Progressing     Problem: Safety - Adult  Goal: Free from fall injury  6/4/2025 1108 by Yumiko Gerard RN  Outcome: Progressing  6/3/2025 2314 by Neelam Gerard RN  Outcome: Progressing     Problem: Pain  Goal: Verbalizes/displays adequate comfort level or baseline comfort level  6/4/2025 1108 by Yumiko Gerard RN  Outcome: Progressing  6/3/2025 2314 by Neelam Gerard RN  Outcome: Progressing

## 2025-06-04 NOTE — PLAN OF CARE
Problem: Discharge Planning  Goal: Discharge to home or other facility with appropriate resources  6/3/2025 2314 by Neelam Gerard RN  Outcome: Progressing  6/3/2025 1046 by Yumiko Gerard RN  Outcome: Progressing     Problem: Safety - Adult  Goal: Free from fall injury  6/3/2025 2314 by Neelam Gerard RN  Outcome: Progressing  6/3/2025 1046 by Yumiko Gerard RN  Outcome: Progressing     Problem: Pain  Goal: Verbalizes/displays adequate comfort level or baseline comfort level  6/3/2025 2314 by Neelam Gerard RN  Outcome: Progressing  6/3/2025 1046 by Yumiko Gerard RN  Outcome: Progressing

## 2025-06-04 NOTE — DISCHARGE INSTRUCTIONS
Avoid high intensity activities.  Rest as necessary.  If dyspnea on exertion worsens, call MD or return to hospital.     DISCHARGE SUMMARY from Nurse    PATIENT INSTRUCTIONS:    After general anesthesia or intravenous sedation, for 24 hours or while taking prescription Narcotics:  Limit your activities  Do not drive and operate hazardous machinery  Do not make important personal or business decisions  Do  not drink alcoholic beverages  If you have not urinated within 8 hours after discharge, please contact your surgeon on call.    Report the following to your surgeon:  Excessive pain, swelling, redness or odor of or around the surgical area  Temperature over 100.5  Nausea and vomiting lasting longer than 4 hours or if unable to take medications  Any signs of decreased circulation or nerve impairment to extremity: change in color, persistent  numbness, tingling, coldness or increase pain  Any questions    What to do at Home:  Recommended activity: activity as tolerated.    If you experience any of the following symptoms fever, nausea, vomiting, increased shortness of breath, chest pain, please follow up with pcp.    *  Please give a list of your current medications to your Primary Care Provider.    *  Please update this list whenever your medications are discontinued, doses are      changed, or new medications (including over-the-counter products) are added.    *  Please carry medication information at all times in case of emergency situations.    These are general instructions for a healthy lifestyle:    No smoking/ No tobacco products/ Avoid exposure to second hand smoke  Surgeon General's Warning:  Quitting smoking now greatly reduces serious risk to your health.    Obesity, smoking, and sedentary lifestyle greatly increases your risk for illness    A healthy diet, regular physical exercise & weight monitoring are important for maintaining a healthy lifestyle    You may be retaining fluid if you have a history of

## 2025-06-04 NOTE — PROGRESS NOTES
[facility] Hematology & Oncology        Inpatient Hematology / Oncology Progress Note    Reason for Consult:  Recurrent pulmonary embolism (HCC) [I26.99]  Bilateral pulmonary embolism (HCC) [I26.99]  Chest pain, unspecified type [R07.9]  Referring Physician:  David Irene MD    24 Hour Events:  VSS, afebrile    Now on Eliquis  VQ scan with abnormalities related to COPD/fibrosis - referring to pulm      ROS:  Constitutional: Negative for fever, chills, weakness, malaise, fatigue.  CV: Negative for chest pain, palpitations, edema.  Respiratory: +dyspnea. Negative for cough, wheezing.  GI: Negative for nausea, abdominal pain, diarrhea.    10 point review of systems is otherwise negative with the exception of the elements mentioned above in the HPI.           No Known Allergies  Past Medical History:   Diagnosis Date    Anxiety     GERD (gastroesophageal reflux disease)     pt treats with tums- resolves per pt- sleeps 1 pillow    Hypertension     managed with meds    NSTEMI (non-ST elevated myocardial infarction) (HCC) 8/14/2014    no stents- no angioplasty-- treatment with medication only    Other ill-defined conditions(799.89)     spirocytosis, high cholesterol    PUD (peptic ulcer disease) 2014    resolved per pt/ treated    Spherocytosis     Spherocytosis     URI (upper respiratory infection) 8/14/2014     Past Surgical History:   Procedure Laterality Date    APPENDECTOMY      CARDIAC CATHETERIZATION      CARDIAC CATHETERIZATION  2014    no intervention    GI      spleen removed    HERNIA REPAIR      SPLENECTOMY      SPLENECTOMY  1987    and cholecystectomy     TONSILLECTOMY      WRIST FRACTURE SURGERY Right 03/2017    titanium levi/screws     Family History   Problem Relation Age of Onset    Heart Disease Father     Hypertension Mother     Heart Disease Mother     Diabetes Paternal Grandfather     Heart Disease Paternal Grandfather     Stroke Maternal Grandfather     Kidney Disease Father     Stroke Mother

## 2025-06-04 NOTE — PROGRESS NOTES
Outpatient Pharmacy Progress Note for Meds-to-Beds    Total number of Prescriptions Filled: 1    List of Medications (name,strength):  apixaban 5mg      Delivered to: drake garvin      Co-pay: 0      Payment Type:0      Additional Documentation:  Medication(s) were delivered to the patient's room prior to discharge      Thank you for letting us serve your patients.  [facility] [id number]- [place] [address]  Phone: [phone]  Fax: [phone]

## 2025-06-04 NOTE — DISCHARGE SUMMARY
39.7*   MCHC 35.9* 35.1* 35.1* 36.6*   RDW 15.2* 15.3* 15.3* 15.4*    348 306 284   MPV 9.3* 8.9* 9.7 9.2*   NRBC 0.00 0.00 0.02 0.02   LYMPHOPCT 11.0*  --  24.1  --    MONOPCT 9.3  --  9.6  --    BASOPCT 0.6  --  0.9  --    IMMGRAN 0.4  --  0.5  --    LYMPHSABS 2.23  --  4.20  --    EOSABS 0.06  --  0.20  --    MONOSABS 1.89*  --  1.67*  --    BASOSABS 0.12  --  0.16  --    ABSIMMGRAN 0.08  --  0.09  --       LFT Recent Labs     06/02/25  0949   BILITOT 6.2*   ALKPHOS 81   AST 36   ALT 26   PROTEIN 6.5   ALBUMIN 3.3*   GLOB 3.3      Cardiac  Lab Results   Component Value Date/Time    TROPHS 62.9 02/13/2024 12:22 AM    TROPHS 68.3 02/12/2024 11:03 PM    TROPHS 298.4 06/17/2023 02:12 AM      Coags Lab Results   Component Value Date/Time    PROTIME 14.8 06/02/2025 12:24 PM    PROTIME 14.5 04/05/2025 04:41 PM    PROTIME 13.5 06/16/2023 08:05 PM    INR 1.2 06/02/2025 12:24 PM    INR 1.1 04/05/2025 04:41 PM    INR 1.0 06/16/2023 08:05 PM    APTT 73.8 06/03/2025 08:46 PM    APTT 70.7 06/03/2025 01:21 PM    APTT 52.8 06/03/2025 04:54 AM    APTT 91.1 02/03/2020 10:25 AM    APTT 58.5 02/03/2020 03:22 AM    APTT 83.2 02/02/2020 08:51 PM      A1c Lab Results   Component Value Date/Time    LABA1C 4.9 04/06/2025 05:54 AM    LABA1C 4.8 08/16/2019 10:54 AM    EAG 93 04/06/2025 05:54 AM      Lipids Lab Results   Component Value Date/Time    CHOL 214 08/16/2019 10:54 AM    HDL 45 08/16/2019 10:54 AM    TRIG 94 08/16/2019 10:54 AM      Thyroid  No results found for: \"TSHELE\", \"[id number]\"     Most Recent UA Lab Results   Component Value Date/Time    COLORU ORANGE 04/06/2025 06:42 AM    APPEARANCE CLEAR 04/06/2025 06:42 AM    PROTEINU 30 04/06/2025 06:42 AM    GLUCOSEU Negative 04/06/2025 06:42 AM    KETUA Negative 04/06/2025 06:42 AM    BILIRUBINUR SMALL 04/06/2025 06:42 AM    BLOODU Negative 04/06/2025 06:42 AM    UROBILINOGEN 1.0 04/06/2025 06:42 AM    NITRU Positive 04/06/2025 06:42 AM    LEUKOCYTESUR SMALL 04/06/2025 06:42 AM

## 2025-06-04 NOTE — CARE COORDINATION
Patient with discharge orders for today. No needs made known to CM. Patient has met all treatment goals and milestones for discharge. Family to provide transportation home. CM following until patient is discharged.      06/04/25 1530   Services At/After Discharge   Transition of Care Consult (CM Consult) Discharge Planning   Services At/After Discharge None   [place] Resource Information Provided? No   Mode of Transport at Discharge Self   Confirm Follow Up Transport Self   Condition of Participation: Discharge Planning   The Plan for Transition of Care is related to the following treatment goals: Return to baseline   The Patient and/or Patient Representative was provided with a Choice of Provider? Patient   The Patient and/Or Patient Representative agree with the Discharge Plan? Yes   Freedom of Choice list was provided with basic dialogue that supports the patient's individualized plan of care/goals, treatment preferences, and shares the quality data associated with the providers?  Yes

## 2025-06-05 ENCOUNTER — CLINICAL DOCUMENTATION (OUTPATIENT)
Facility: HOSPITAL | Age: 45
End: 2025-06-05

## 2025-06-09 ENCOUNTER — CLINICAL DOCUMENTATION (OUTPATIENT)
Facility: HOSPITAL | Age: 45
End: 2025-06-09

## 2025-06-09 NOTE — PROGRESS NOTES
Patient Assistance    FN called pt to assist with copay card for Eliquis. No answer. FN left vm requesting return call               Additional notes:

## 2025-06-12 LAB
APCR PPP: 2.7 RATIO
APTT HEX PL PPP: 3 SEC
APTT IMM NP PPP: ABNORMAL SEC
APTT PPP 1:1 SALINE: ABNORMAL SEC
APTT PPP: 77.8 SEC
AT III ACT/NOR PPP CHRO: 82 %
B2 GLYCOPROT1 IGA SER-ACNC: <10 SAU
B2 GLYCOPROT1 IGG SER-ACNC: <10 SGU
B2 GLYCOPROT1 IGM SER-ACNC: <10 SMU
CARDIOLIPIN IGG SER IA-ACNC: <10 GPL
CARDIOLIPIN IGM SER IA-ACNC: <10 MPL
CONFIRM DRVVT: ABNORMAL SEC
F2 GENE MUT ANL BLD/T: ABNORMAL
FACTOR VIII ACTIVITY: 155 %
GENE DIS ANL INTERP-IMP: ABNORMAL
HOMOCYSTEINE: ABNORMAL UMOL/L
LABORATORY COMMENT REPORT: ABNORMAL
LABORATORY COMMENT REPORT: ABNORMAL
PROT C ACT/NOR PPP CHRO: 72 %
PROTEIN S ANTIGEN, FREE: 134 %
REF LAB TEST METHOD: ABNORMAL
SCREEN DRVVT/NORMAL: ABNORMAL RATIO
SCREEN DRVVT: 46.8 SEC

## 2025-06-19 ENCOUNTER — CLINICAL DOCUMENTATION (OUTPATIENT)
Facility: HOSPITAL | Age: 45
End: 2025-06-19

## 2025-06-19 NOTE — PROGRESS NOTES
Patient Assistance    FN Called pt. No answer. Left vm requesting return call               Additional notes:

## 2025-06-23 LAB
Lab: NORMAL
Lab: NORMAL
REFERENCE LAB: NORMAL

## 2025-06-24 ENCOUNTER — APPOINTMENT (OUTPATIENT)
Dept: CT IMAGING | Age: 45
End: 2025-06-24
Payer: COMMERCIAL

## 2025-06-24 ENCOUNTER — HOSPITAL ENCOUNTER (OUTPATIENT)
Age: 45
Setting detail: OBSERVATION
Discharge: HOME | End: 2025-06-26
Admitting: INTERNAL MEDICINE
Payer: COMMERCIAL

## 2025-06-24 DIAGNOSIS — R07.9 CHEST PAIN, UNSPECIFIED TYPE: ICD-10-CM

## 2025-06-24 DIAGNOSIS — F10.10 ETOH ABUSE: ICD-10-CM

## 2025-06-24 DIAGNOSIS — I26.99 BILATERAL PULMONARY EMBOLISM (HCC): ICD-10-CM

## 2025-06-24 DIAGNOSIS — R56.9 NEW ONSET SEIZURE (HCC): Primary | ICD-10-CM

## 2025-06-24 LAB
ALBUMIN SERPL-MCNC: 3.4 G/DL (ref 3.5–5)
ALBUMIN/GLOB SERPL: 1 (ref 1–1.9)
ALP SERPL-CCNC: 86 U/L (ref 40–129)
ALT SERPL-CCNC: 27 U/L (ref 8–55)
ANION GAP SERPL CALC-SCNC: 16 MMOL/L (ref 7–16)
ARTERIAL PATENCY WRIST A: POSITIVE
AST SERPL-CCNC: 36 U/L (ref 15–37)
BASE EXCESS BLD CALC-SCNC: 1.2 MMOL/L
BASOPHILS # BLD: 0.14 K/UL (ref 0–0.2)
BASOPHILS NFR BLD: 1.4 % (ref 0–2)
BDY SITE: ABNORMAL
BILIRUB SERPL-MCNC: 1.3 MG/DL (ref 0–1.2)
BUN SERPL-MCNC: 4 MG/DL (ref 6–23)
CALCIUM SERPL-MCNC: 9.2 MG/DL (ref 8.8–10.2)
CHLORIDE SERPL-SCNC: 107 MMOL/L (ref 98–107)
CO2 SERPL-SCNC: 21 MMOL/L (ref 20–29)
CREAT SERPL-MCNC: 0.86 MG/DL (ref 0.8–1.3)
D DIMER PPP FEU-MCNC: 0.3 UG/ML(FEU)
DIFFERENTIAL METHOD BLD: ABNORMAL
EKG ATRIAL RATE: 119 BPM
EKG DIAGNOSIS: NORMAL
EKG P AXIS: 75 DEGREES
EKG P-R INTERVAL: 147 MS
EKG Q-T INTERVAL: 332 MS
EKG QRS DURATION: 91 MS
EKG QTC CALCULATION (BAZETT): 468 MS
EKG R AXIS: 237 DEGREES
EKG T AXIS: -2 DEGREES
EKG VENTRICULAR RATE: 119 BPM
EOSINOPHIL # BLD: 0.28 K/UL (ref 0–0.8)
EOSINOPHIL NFR BLD: 2.8 % (ref 0.5–7.8)
ERYTHROCYTE [DISTWIDTH] IN BLOOD BY AUTOMATED COUNT: 15.7 % (ref 11.9–14.6)
GLOBULIN SER CALC-MCNC: 3.5 G/DL (ref 2.3–3.5)
GLUCOSE SERPL-MCNC: 99 MG/DL (ref 70–99)
HCO3 BLD-SCNC: 24.9 MMOL/L (ref 21–28)
HCT VFR BLD AUTO: 46.8 % (ref 41.1–50.3)
HGB BLD-MCNC: 17.1 G/DL (ref 13.6–17.2)
IMM GRANULOCYTES # BLD AUTO: 0.06 K/UL (ref 0–0.5)
IMM GRANULOCYTES NFR BLD AUTO: 0.6 % (ref 0–5)
LYMPHOCYTES # BLD: 4.61 K/UL (ref 0.5–4.6)
LYMPHOCYTES NFR BLD: 46.1 % (ref 13–44)
MCH RBC QN AUTO: 38.6 PG (ref 26.1–32.9)
MCHC RBC AUTO-ENTMCNC: 36.5 G/DL (ref 31.4–35)
MCV RBC AUTO: 105.6 FL (ref 82–102)
MONOCYTES # BLD: 0.78 K/UL (ref 0.1–1.3)
MONOCYTES NFR BLD: 7.8 % (ref 4–12)
NEUTS SEG # BLD: 4.13 K/UL (ref 1.7–8.2)
NEUTS SEG NFR BLD: 41.3 % (ref 43–78)
NRBC # BLD: 0.02 K/UL (ref 0–0.2)
O2/TOTAL GAS SETTING VFR VENT: 21 %
PCO2 BLD: 36 MMHG (ref 35–45)
PH BLD: 7.45 (ref 7.35–7.45)
PLATELET # BLD AUTO: 410 K/UL (ref 150–450)
PLATELET COMMENT: ADEQUATE
PMV BLD AUTO: 8.4 FL (ref 9.4–12.3)
PO2 BLD: 70 MMHG (ref 75–100)
POTASSIUM SERPL-SCNC: 3.3 MMOL/L (ref 3.5–5.1)
PROT SERPL-MCNC: 6.8 G/DL (ref 6.3–8.2)
RBC # BLD AUTO: 4.43 M/UL (ref 4.23–5.6)
RBC MORPH BLD: ABNORMAL
SAO2 % BLD: 94.7 % (ref 94–98)
SERVICE CMNT-IMP: ABNORMAL
SODIUM SERPL-SCNC: 143 MMOL/L (ref 136–145)
SPECIMEN TYPE: ABNORMAL
TROPONIN T SERPL HS-MCNC: 42.5 NG/L (ref 0–22)
TROPONIN T SERPL HS-MCNC: 50.1 NG/L (ref 0–22)
WBC # BLD AUTO: 10 K/UL (ref 4.3–11.1)
WBC MORPH BLD: ABNORMAL

## 2025-06-24 PROCEDURE — 93010 ELECTROCARDIOGRAM REPORT: CPT | Performed by: INTERNAL MEDICINE

## 2025-06-24 PROCEDURE — 80307 DRUG TEST PRSMV CHEM ANLYZR: CPT

## 2025-06-24 PROCEDURE — 80053 COMPREHEN METABOLIC PANEL: CPT

## 2025-06-24 PROCEDURE — 85025 COMPLETE CBC W/AUTO DIFF WBC: CPT

## 2025-06-24 PROCEDURE — 84484 ASSAY OF TROPONIN QUANT: CPT

## 2025-06-24 PROCEDURE — 96374 THER/PROPH/DIAG INJ IV PUSH: CPT

## 2025-06-24 PROCEDURE — 82803 BLOOD GASES ANY COMBINATION: CPT

## 2025-06-24 PROCEDURE — 6360000002 HC RX W HCPCS

## 2025-06-24 PROCEDURE — 85379 FIBRIN DEGRADATION QUANT: CPT

## 2025-06-24 PROCEDURE — 70450 CT HEAD/BRAIN W/O DYE: CPT

## 2025-06-24 PROCEDURE — 93005 ELECTROCARDIOGRAM TRACING: CPT

## 2025-06-24 PROCEDURE — 6370000000 HC RX 637 (ALT 250 FOR IP)

## 2025-06-24 PROCEDURE — 36600 WITHDRAWAL OF ARTERIAL BLOOD: CPT

## 2025-06-24 PROCEDURE — 96375 TX/PRO/DX INJ NEW DRUG ADDON: CPT

## 2025-06-24 PROCEDURE — 6360000004 HC RX CONTRAST MEDICATION

## 2025-06-24 PROCEDURE — 71260 CT THORAX DX C+: CPT

## 2025-06-24 PROCEDURE — 99285 EMERGENCY DEPT VISIT HI MDM: CPT

## 2025-06-24 RX ORDER — IOPAMIDOL 755 MG/ML
75 INJECTION, SOLUTION INTRAVASCULAR
Status: COMPLETED | OUTPATIENT
Start: 2025-06-24 | End: 2025-06-24

## 2025-06-24 RX ORDER — POTASSIUM CHLORIDE 1500 MG/1
40 TABLET, EXTENDED RELEASE ORAL ONCE
Status: COMPLETED | OUTPATIENT
Start: 2025-06-24 | End: 2025-06-25

## 2025-06-24 RX ORDER — LORAZEPAM 2 MG/ML
2 INJECTION INTRAMUSCULAR EVERY 6 HOURS PRN
Status: DISCONTINUED | OUTPATIENT
Start: 2025-06-24 | End: 2025-06-24

## 2025-06-24 RX ORDER — LORAZEPAM 2 MG/ML
2 INJECTION INTRAMUSCULAR ONCE
Status: COMPLETED | OUTPATIENT
Start: 2025-06-24 | End: 2025-06-24

## 2025-06-24 RX ORDER — ASPIRIN 81 MG/1
324 TABLET, CHEWABLE ORAL ONCE
Status: COMPLETED | OUTPATIENT
Start: 2025-06-24 | End: 2025-06-24

## 2025-06-24 RX ORDER — LORAZEPAM 2 MG/ML
INJECTION INTRAMUSCULAR
Status: COMPLETED
Start: 2025-06-24 | End: 2025-06-24

## 2025-06-24 RX ORDER — LEVETIRACETAM 500 MG/5ML
1000 INJECTION, SOLUTION, CONCENTRATE INTRAVENOUS EVERY 12 HOURS
Status: DISCONTINUED | OUTPATIENT
Start: 2025-06-24 | End: 2025-06-25

## 2025-06-24 RX ADMIN — LORAZEPAM 2 MG: 2 INJECTION INTRAMUSCULAR; INTRAVENOUS at 20:26

## 2025-06-24 RX ADMIN — LEVETIRACETAM 1000 MG: 100 INJECTION INTRAVENOUS at 20:28

## 2025-06-24 RX ADMIN — ASPIRIN 324 MG: 81 TABLET, CHEWABLE ORAL at 20:29

## 2025-06-24 RX ADMIN — IOPAMIDOL 75 ML: 755 INJECTION, SOLUTION INTRAVENOUS at 23:19

## 2025-06-24 RX ADMIN — LORAZEPAM 2 MG: 2 INJECTION INTRAMUSCULAR at 20:26

## 2025-06-24 ASSESSMENT — PAIN SCALES - GENERAL: PAINLEVEL_OUTOF10: 10

## 2025-06-24 ASSESSMENT — PAIN - FUNCTIONAL ASSESSMENT: PAIN_FUNCTIONAL_ASSESSMENT: 0-10

## 2025-06-25 PROBLEM — R07.9 CHEST PAIN: Chronic | Status: ACTIVE | Noted: 2025-06-03

## 2025-06-25 PROBLEM — R07.9 CHEST PAIN: Status: ACTIVE | Noted: 2025-06-25

## 2025-06-25 PROBLEM — R56.9 NEW ONSET SEIZURE (HCC): Status: ACTIVE | Noted: 2025-06-25

## 2025-06-25 LAB
AMPHET UR QL SCN: NEGATIVE
ANION GAP SERPL CALC-SCNC: 12 MMOL/L (ref 7–16)
BARBITURATES UR QL SCN: NEGATIVE
BENZODIAZ UR QL: POSITIVE
BUN SERPL-MCNC: 5 MG/DL (ref 6–23)
CALCIUM SERPL-MCNC: 9.2 MG/DL (ref 8.8–10.2)
CANNABINOIDS UR QL SCN: NEGATIVE
CHLORIDE SERPL-SCNC: 106 MMOL/L (ref 98–107)
CO2 SERPL-SCNC: 25 MMOL/L (ref 20–29)
COCAINE UR QL SCN: NEGATIVE
CREAT SERPL-MCNC: 0.79 MG/DL (ref 0.8–1.3)
EKG ATRIAL RATE: 104 BPM
EKG DIAGNOSIS: NORMAL
EKG P AXIS: 58 DEGREES
EKG P-R INTERVAL: 156 MS
EKG Q-T INTERVAL: 354 MS
EKG QRS DURATION: 88 MS
EKG QTC CALCULATION (BAZETT): 465 MS
EKG R AXIS: 138 DEGREES
EKG T AXIS: -4 DEGREES
EKG VENTRICULAR RATE: 104 BPM
ERYTHROCYTE [DISTWIDTH] IN BLOOD BY AUTOMATED COUNT: 15.9 % (ref 11.9–14.6)
ETHANOL SERPL-MCNC: 219 MG/DL (ref 0–0.08)
GLUCOSE SERPL-MCNC: 79 MG/DL (ref 70–99)
HCT VFR BLD AUTO: 47.3 % (ref 41.1–50.3)
HGB BLD-MCNC: 16.6 G/DL (ref 13.6–17.2)
Lab: ABNORMAL
MAGNESIUM SERPL-MCNC: 2.1 MG/DL (ref 1.8–2.4)
MCH RBC QN AUTO: 38.5 PG (ref 26.1–32.9)
MCHC RBC AUTO-ENTMCNC: 35.1 G/DL (ref 31.4–35)
MCV RBC AUTO: 109.7 FL (ref 82–102)
METHADONE UR QL: NEGATIVE
NRBC # BLD: 0.03 K/UL (ref 0–0.2)
OPIATES UR QL: NEGATIVE
PCP UR QL: NEGATIVE
PLATELET # BLD AUTO: 340 K/UL (ref 150–450)
PMV BLD AUTO: 8.3 FL (ref 9.4–12.3)
POTASSIUM SERPL-SCNC: 4.2 MMOL/L (ref 3.5–5.1)
RBC # BLD AUTO: 4.31 M/UL (ref 4.23–5.6)
SODIUM SERPL-SCNC: 143 MMOL/L (ref 136–145)
TROPONIN T SERPL HS-MCNC: 56 NG/L (ref 0–22)
WBC # BLD AUTO: 7.5 K/UL (ref 4.3–11.1)

## 2025-06-25 PROCEDURE — 6370000000 HC RX 637 (ALT 250 FOR IP): Performed by: INTERNAL MEDICINE

## 2025-06-25 PROCEDURE — 83735 ASSAY OF MAGNESIUM: CPT

## 2025-06-25 PROCEDURE — G0378 HOSPITAL OBSERVATION PER HR: HCPCS

## 2025-06-25 PROCEDURE — 93010 ELECTROCARDIOGRAM REPORT: CPT | Performed by: INTERNAL MEDICINE

## 2025-06-25 PROCEDURE — 2580000003 HC RX 258: Performed by: INTERNAL MEDICINE

## 2025-06-25 PROCEDURE — 2500000003 HC RX 250 WO HCPCS: Performed by: HOSPITALIST

## 2025-06-25 PROCEDURE — 6370000000 HC RX 637 (ALT 250 FOR IP)

## 2025-06-25 PROCEDURE — 6370000000 HC RX 637 (ALT 250 FOR IP): Performed by: NURSE PRACTITIONER

## 2025-06-25 PROCEDURE — 6360000002 HC RX W HCPCS: Performed by: HOSPITALIST

## 2025-06-25 PROCEDURE — 84484 ASSAY OF TROPONIN QUANT: CPT

## 2025-06-25 PROCEDURE — 96376 TX/PRO/DX INJ SAME DRUG ADON: CPT

## 2025-06-25 PROCEDURE — 6370000000 HC RX 637 (ALT 250 FOR IP): Performed by: HOSPITALIST

## 2025-06-25 PROCEDURE — 96375 TX/PRO/DX INJ NEW DRUG ADDON: CPT

## 2025-06-25 PROCEDURE — 85027 COMPLETE CBC AUTOMATED: CPT

## 2025-06-25 PROCEDURE — 2500000003 HC RX 250 WO HCPCS: Performed by: INTERNAL MEDICINE

## 2025-06-25 PROCEDURE — 6360000002 HC RX W HCPCS: Performed by: INTERNAL MEDICINE

## 2025-06-25 PROCEDURE — 93005 ELECTROCARDIOGRAM TRACING: CPT | Performed by: HOSPITALIST

## 2025-06-25 PROCEDURE — 80048 BASIC METABOLIC PNL TOTAL CA: CPT

## 2025-06-25 PROCEDURE — 36415 COLL VENOUS BLD VENIPUNCTURE: CPT

## 2025-06-25 PROCEDURE — 82077 ASSAY SPEC XCP UR&BREATH IA: CPT

## 2025-06-25 RX ORDER — SODIUM CHLORIDE 0.9 % (FLUSH) 0.9 %
5-40 SYRINGE (ML) INJECTION EVERY 12 HOURS SCHEDULED
Status: DISCONTINUED | OUTPATIENT
Start: 2025-06-25 | End: 2025-06-26 | Stop reason: HOSPADM

## 2025-06-25 RX ORDER — LORAZEPAM 0.5 MG/1
0.5 TABLET ORAL EVERY 4 HOURS PRN
Status: DISCONTINUED | OUTPATIENT
Start: 2025-06-25 | End: 2025-06-26 | Stop reason: HOSPADM

## 2025-06-25 RX ORDER — LORAZEPAM 1 MG/1
1 TABLET ORAL
Status: DISCONTINUED | OUTPATIENT
Start: 2025-06-25 | End: 2025-06-26 | Stop reason: HOSPADM

## 2025-06-25 RX ORDER — ATORVASTATIN CALCIUM 80 MG/1
80 TABLET, FILM COATED ORAL NIGHTLY
Status: DISCONTINUED | OUTPATIENT
Start: 2025-06-25 | End: 2025-06-26 | Stop reason: HOSPADM

## 2025-06-25 RX ORDER — LORAZEPAM 1 MG/1
2 TABLET ORAL
Status: DISCONTINUED | OUTPATIENT
Start: 2025-06-25 | End: 2025-06-26 | Stop reason: HOSPADM

## 2025-06-25 RX ORDER — POLYETHYLENE GLYCOL 3350 17 G/17G
17 POWDER, FOR SOLUTION ORAL DAILY PRN
Status: DISCONTINUED | OUTPATIENT
Start: 2025-06-25 | End: 2025-06-26 | Stop reason: HOSPADM

## 2025-06-25 RX ORDER — CALCIUM CARBONATE 500 MG/1
500 TABLET, CHEWABLE ORAL 3 TIMES DAILY PRN
Status: DISCONTINUED | OUTPATIENT
Start: 2025-06-25 | End: 2025-06-26 | Stop reason: HOSPADM

## 2025-06-25 RX ORDER — DIAZEPAM 10 MG/2ML
5 INJECTION, SOLUTION INTRAMUSCULAR; INTRAVENOUS EVERY 4 HOURS PRN
Status: DISCONTINUED | OUTPATIENT
Start: 2025-06-25 | End: 2025-06-25

## 2025-06-25 RX ORDER — NICOTINE 21 MG/24HR
1 PATCH, TRANSDERMAL 24 HOURS TRANSDERMAL NIGHTLY
Status: DISCONTINUED | OUTPATIENT
Start: 2025-06-25 | End: 2025-06-26 | Stop reason: HOSPADM

## 2025-06-25 RX ORDER — CARVEDILOL 3.12 MG/1
3.12 TABLET ORAL 2 TIMES DAILY WITH MEALS
Status: DISCONTINUED | OUTPATIENT
Start: 2025-06-25 | End: 2025-06-26 | Stop reason: HOSPADM

## 2025-06-25 RX ORDER — NICOTINE 21 MG/24HR
1 PATCH, TRANSDERMAL 24 HOURS TRANSDERMAL DAILY
Status: DISCONTINUED | OUTPATIENT
Start: 2025-06-25 | End: 2025-06-25

## 2025-06-25 RX ORDER — ONDANSETRON 2 MG/ML
4 INJECTION INTRAMUSCULAR; INTRAVENOUS EVERY 6 HOURS PRN
Status: DISCONTINUED | OUTPATIENT
Start: 2025-06-25 | End: 2025-06-26 | Stop reason: HOSPADM

## 2025-06-25 RX ORDER — DIAZEPAM 10 MG/2ML
15 INJECTION, SOLUTION INTRAMUSCULAR; INTRAVENOUS
Status: DISCONTINUED | OUTPATIENT
Start: 2025-06-25 | End: 2025-06-26 | Stop reason: HOSPADM

## 2025-06-25 RX ORDER — POTASSIUM CHLORIDE 7.45 MG/ML
10 INJECTION INTRAVENOUS PRN
Status: DISCONTINUED | OUTPATIENT
Start: 2025-06-25 | End: 2025-06-26 | Stop reason: HOSPADM

## 2025-06-25 RX ORDER — SODIUM CHLORIDE, SODIUM LACTATE, POTASSIUM CHLORIDE, CALCIUM CHLORIDE 600; 310; 30; 20 MG/100ML; MG/100ML; MG/100ML; MG/100ML
INJECTION, SOLUTION INTRAVENOUS CONTINUOUS
Status: ACTIVE | OUTPATIENT
Start: 2025-06-25 | End: 2025-06-26

## 2025-06-25 RX ORDER — LORAZEPAM 1 MG/1
3 TABLET ORAL
Status: DISCONTINUED | OUTPATIENT
Start: 2025-06-25 | End: 2025-06-26 | Stop reason: HOSPADM

## 2025-06-25 RX ORDER — LORAZEPAM 1 MG/1
4 TABLET ORAL
Status: DISCONTINUED | OUTPATIENT
Start: 2025-06-25 | End: 2025-06-26 | Stop reason: HOSPADM

## 2025-06-25 RX ORDER — SODIUM CHLORIDE 9 MG/ML
INJECTION, SOLUTION INTRAVENOUS PRN
Status: DISCONTINUED | OUTPATIENT
Start: 2025-06-25 | End: 2025-06-26 | Stop reason: HOSPADM

## 2025-06-25 RX ORDER — NITROGLYCERIN 0.4 MG/1
0.4 TABLET SUBLINGUAL EVERY 5 MIN PRN
Status: DISCONTINUED | OUTPATIENT
Start: 2025-06-25 | End: 2025-06-26 | Stop reason: HOSPADM

## 2025-06-25 RX ORDER — DIAZEPAM 10 MG/2ML
5 INJECTION, SOLUTION INTRAMUSCULAR; INTRAVENOUS
Status: DISCONTINUED | OUTPATIENT
Start: 2025-06-25 | End: 2025-06-26 | Stop reason: HOSPADM

## 2025-06-25 RX ORDER — ASPIRIN 325 MG
325 TABLET ORAL DAILY
Status: DISCONTINUED | OUTPATIENT
Start: 2025-06-25 | End: 2025-06-26 | Stop reason: HOSPADM

## 2025-06-25 RX ORDER — ACETAMINOPHEN 650 MG/1
650 SUPPOSITORY RECTAL EVERY 6 HOURS PRN
Status: DISCONTINUED | OUTPATIENT
Start: 2025-06-25 | End: 2025-06-26 | Stop reason: HOSPADM

## 2025-06-25 RX ORDER — LEVETIRACETAM 500 MG/5ML
500 INJECTION, SOLUTION, CONCENTRATE INTRAVENOUS EVERY 12 HOURS
Status: DISCONTINUED | OUTPATIENT
Start: 2025-06-25 | End: 2025-06-26 | Stop reason: HOSPADM

## 2025-06-25 RX ORDER — ACETAMINOPHEN 325 MG/1
650 TABLET ORAL EVERY 6 HOURS PRN
Status: DISCONTINUED | OUTPATIENT
Start: 2025-06-25 | End: 2025-06-26 | Stop reason: HOSPADM

## 2025-06-25 RX ORDER — PANTOPRAZOLE SODIUM 40 MG/1
40 TABLET, DELAYED RELEASE ORAL
Status: DISCONTINUED | OUTPATIENT
Start: 2025-06-25 | End: 2025-06-26 | Stop reason: HOSPADM

## 2025-06-25 RX ORDER — POTASSIUM CHLORIDE 1500 MG/1
40 TABLET, EXTENDED RELEASE ORAL PRN
Status: DISCONTINUED | OUTPATIENT
Start: 2025-06-25 | End: 2025-06-26 | Stop reason: HOSPADM

## 2025-06-25 RX ORDER — MAGNESIUM SULFATE IN WATER 40 MG/ML
2000 INJECTION, SOLUTION INTRAVENOUS PRN
Status: DISCONTINUED | OUTPATIENT
Start: 2025-06-25 | End: 2025-06-26 | Stop reason: HOSPADM

## 2025-06-25 RX ORDER — SODIUM CHLORIDE 0.9 % (FLUSH) 0.9 %
5-40 SYRINGE (ML) INJECTION PRN
Status: DISCONTINUED | OUTPATIENT
Start: 2025-06-25 | End: 2025-06-26 | Stop reason: HOSPADM

## 2025-06-25 RX ORDER — LANOLIN ALCOHOL/MO/W.PET/CERES
100 CREAM (GRAM) TOPICAL DAILY
Status: DISCONTINUED | OUTPATIENT
Start: 2025-06-25 | End: 2025-06-26 | Stop reason: HOSPADM

## 2025-06-25 RX ORDER — NICOTINE 21 MG/24HR
1 PATCH, TRANSDERMAL 24 HOURS TRANSDERMAL
Status: DISCONTINUED | OUTPATIENT
Start: 2025-06-25 | End: 2025-06-25 | Stop reason: SDUPTHER

## 2025-06-25 RX ORDER — DIAZEPAM 10 MG/2ML
10 INJECTION, SOLUTION INTRAMUSCULAR; INTRAVENOUS
Status: DISCONTINUED | OUTPATIENT
Start: 2025-06-25 | End: 2025-06-26 | Stop reason: HOSPADM

## 2025-06-25 RX ORDER — MORPHINE SULFATE 2 MG/ML
2 INJECTION, SOLUTION INTRAMUSCULAR; INTRAVENOUS ONCE
Refills: 0 | Status: DISCONTINUED | OUTPATIENT
Start: 2025-06-25 | End: 2025-06-26 | Stop reason: HOSPADM

## 2025-06-25 RX ORDER — LOSARTAN POTASSIUM 25 MG/1
25 TABLET ORAL DAILY
Status: DISCONTINUED | OUTPATIENT
Start: 2025-06-25 | End: 2025-06-26 | Stop reason: HOSPADM

## 2025-06-25 RX ORDER — ONDANSETRON 4 MG/1
4 TABLET, ORALLY DISINTEGRATING ORAL EVERY 8 HOURS PRN
Status: DISCONTINUED | OUTPATIENT
Start: 2025-06-25 | End: 2025-06-26 | Stop reason: HOSPADM

## 2025-06-25 RX ORDER — DIAZEPAM 10 MG/2ML
20 INJECTION, SOLUTION INTRAMUSCULAR; INTRAVENOUS
Status: DISCONTINUED | OUTPATIENT
Start: 2025-06-25 | End: 2025-06-26 | Stop reason: HOSPADM

## 2025-06-25 RX ADMIN — ACETAMINOPHEN 650 MG: 325 TABLET ORAL at 16:46

## 2025-06-25 RX ADMIN — CARVEDILOL 3.12 MG: 3.12 TABLET, FILM COATED ORAL at 18:14

## 2025-06-25 RX ADMIN — LOSARTAN POTASSIUM 25 MG: 25 TABLET, FILM COATED ORAL at 10:09

## 2025-06-25 RX ADMIN — SODIUM CHLORIDE, PRESERVATIVE FREE 10 ML: 5 INJECTION INTRAVENOUS at 10:09

## 2025-06-25 RX ADMIN — ASPIRIN 325 MG: 325 TABLET, FILM COATED ORAL at 10:09

## 2025-06-25 RX ADMIN — LEVETIRACETAM 1000 MG: 100 INJECTION INTRAVENOUS at 10:09

## 2025-06-25 RX ADMIN — FAMOTIDINE 20 MG: 10 INJECTION, SOLUTION INTRAVENOUS at 18:14

## 2025-06-25 RX ADMIN — ATORVASTATIN CALCIUM 80 MG: 80 TABLET, FILM COATED ORAL at 20:47

## 2025-06-25 RX ADMIN — NITROGLYCERIN 0.4 MG: 0.4 TABLET SUBLINGUAL at 16:45

## 2025-06-25 RX ADMIN — SODIUM CHLORIDE, PRESERVATIVE FREE 10 ML: 5 INJECTION INTRAVENOUS at 20:48

## 2025-06-25 RX ADMIN — POTASSIUM CHLORIDE 40 MEQ: 1500 TABLET, EXTENDED RELEASE ORAL at 03:53

## 2025-06-25 RX ADMIN — Medication 100 MG: at 10:09

## 2025-06-25 RX ADMIN — SODIUM CHLORIDE, POTASSIUM CHLORIDE, SODIUM LACTATE AND CALCIUM CHLORIDE: 600; 310; 30; 20 INJECTION, SOLUTION INTRAVENOUS at 14:39

## 2025-06-25 RX ADMIN — SODIUM CHLORIDE, POTASSIUM CHLORIDE, SODIUM LACTATE AND CALCIUM CHLORIDE: 600; 310; 30; 20 INJECTION, SOLUTION INTRAVENOUS at 03:51

## 2025-06-25 RX ADMIN — PANTOPRAZOLE SODIUM 40 MG: 40 TABLET, DELAYED RELEASE ORAL at 18:14

## 2025-06-25 RX ADMIN — APIXABAN 5 MG: 5 TABLET, FILM COATED ORAL at 03:54

## 2025-06-25 RX ADMIN — ATORVASTATIN CALCIUM 80 MG: 80 TABLET, FILM COATED ORAL at 03:53

## 2025-06-25 RX ADMIN — SODIUM CHLORIDE, PRESERVATIVE FREE 10 ML: 5 INJECTION INTRAVENOUS at 10:10

## 2025-06-25 RX ADMIN — APIXABAN 5 MG: 5 TABLET, FILM COATED ORAL at 20:47

## 2025-06-25 RX ADMIN — NITROGLYCERIN 0.4 MG: 0.4 TABLET SUBLINGUAL at 16:51

## 2025-06-25 RX ADMIN — LEVETIRACETAM 500 MG: 100 INJECTION INTRAVENOUS at 20:47

## 2025-06-25 ASSESSMENT — PAIN DESCRIPTION - DIRECTION: RADIATING_TOWARDS: ABDOMEN

## 2025-06-25 ASSESSMENT — PAIN DESCRIPTION - LOCATION: LOCATION: CHEST;ABDOMEN

## 2025-06-25 ASSESSMENT — LIFESTYLE VARIABLES
HOW OFTEN DO YOU HAVE A DRINK CONTAINING ALCOHOL: 2-3 TIMES A WEEK
HOW MANY STANDARD DRINKS CONTAINING ALCOHOL DO YOU HAVE ON A TYPICAL DAY: 7 TO 9

## 2025-06-25 ASSESSMENT — PAIN DESCRIPTION - DESCRIPTORS: DESCRIPTORS: SQUEEZING;SPASM

## 2025-06-25 ASSESSMENT — PAIN - FUNCTIONAL ASSESSMENT: PAIN_FUNCTIONAL_ASSESSMENT: ACTIVITIES ARE NOT PREVENTED

## 2025-06-25 ASSESSMENT — PAIN DESCRIPTION - ONSET: ONSET: ON-GOING

## 2025-06-25 ASSESSMENT — PAIN SCALES - GENERAL
PAINLEVEL_OUTOF10: 0
PAINLEVEL_OUTOF10: 9

## 2025-06-25 ASSESSMENT — PAIN DESCRIPTION - FREQUENCY: FREQUENCY: INTERMITTENT

## 2025-06-25 ASSESSMENT — PAIN DESCRIPTION - ORIENTATION: ORIENTATION: LEFT

## 2025-06-25 ASSESSMENT — PAIN DESCRIPTION - PAIN TYPE: TYPE: ACUTE PAIN

## 2025-06-25 NOTE — H&P
Hospitalist History and Physical   Admit Date:  2025  8:08 PM   Name:  José Antonio Kyle   Age:  45 y.o.  Sex:  male  :  1980   MRN:  [id number]   Room:  ER/    Presenting/Chief Complaint: Chest Pain and Altered Mental Status     Reason(s) for Admission: New onset seizure (HCC) [R56.9]     History of Present Illness:   HPI mostly taken from discussion with ER provider and previous notes due to patient being too somnolent to give full HPI and ROS.    José Antonio Kyle is a 45 y.o. male with recurrent DVT and PE currently on Eliquis, anxiety with panic attacks, smoker and alcohol drinker, and HTN presented to the ER via EMS with substernal chest pain and was noted in EMS to have a period of staring off into space and being unresponsive.  Upon arrival to the ER he had another episode of staring off into space and was given Ativan and Keppra for possible seizure. Tele-neurology was consulted who recommended Keppra IV twice daily and admission for EEG.  On my exam, the patient is very somnolent and will wake up and mumble something then fall back asleep.  Unable to obtain ROS.      Assessment & Plan:     Principal Problem:    New onset seizure (HCC)  Patient with multiple episodes of staring off into space  Mother has history of absence seizures  Continue Keppra 1 g IV twice daily  Continue Valium as needed  Check MRI brain with and without contrast  Consult in-house neurology for input and assistance  Defer EEG to in-house neurology    Active Problems:    Chest pain  Unsure etiology  Cardiac workup unremarkable  CT chest with improving distal bilateral pulmonary embolism  Patient too somnolent to complain of chest pain on my exam  Placed on remote      Benign essential HTN  Continue losartan 25 mg daily      Pulmonary embolism (HCC)  Satting well on room air  Continue Eliquis 5 mg twice daily  Continue  mg daily      Recurrent deep vein thrombosis (DVT) (HCC)  Being worked up for lupus    Result Value Ref Range    Ethanol Lvl 219 MG/DL       No results for input(s): \"COVID19\" in the last 72 hours.    CT CHEST PULMONARY EMBOLISM W CONTRAST  Result Date: 6/25/2025  EXAMINATION: CT CHEST PULMONARY EMBOLISM W CONTRAST 6/24/2025 11:19 PM ACCESSION NUMBER: [id number] COMPARISON: CTA chest from 6/2/2025. INDICATION: hx of PE, failed on OAC previously TECHNIQUE: Multiple contiguous 2D axial CT images of the chest were obtained from the lung apices to the lung bases after the intravenous administration of 100mL Iso-jimmy 370 per pulmonary angiography protocol. 3D MIP coronal and sagittal images were obtained. 2D sagittal and coronal reconstructions were performed. Radiation dose reduction techniques were used for this study. Our CT scanners use one or all of the following: Automated exposure control, adjustment of the mA and/or kV according to patient size, iterative reconstruction. FINDINGS: There are small filling defects in the subsegmental branches of the bilateral lower lobes, right middle lobe, and lingula related to pulmonary emboli. There is no evidence of central pulmonary embolism. No definite right heart strain is identified. Cardiac size is within normal limits. Thoracic aorta demonstrates a normal caliber. No pericardial or pleural effusion is identified. Evaluation of the lung parenchyma demonstrates no consolidation or pneumothorax. Linear streak areas of atelectasis are present in the dependent aspect of the bilateral lower lobes. Central airway is patent. Upper abdomen demonstrates no acute findings. There is cholelithiasis. Evaluation of the osseous structures demonstrates no acute findings. Degenerative changes are present. There is a chronic anterior wedge compression deformity of L1.     1. Small filling defects in the subsegmental branches greatest in the bilateral lower lobes as above likely relating to pulmonary emboli. No evidence of central pulmonary embolism is seen. 2. Additional

## 2025-06-25 NOTE — PROGRESS NOTES
SPIRITUAL HEALTH  Note for Med/Surg Visit                  Room # 410/01    Name: José Antonio Kyle           Age: 45 y.o.    Gender: male          MRN: [id number]  Anglican: Orthodoxy       Preferred Language: English    Date: 06/25/25  Visit Time: Begin Time: (P) 1125 End Time : (P) 1130 Complexity of Encounter: (P) Low      Visit Summary:  offered spiritual care visit with patient. Patient declined a visit at this time.  is available for follow up at patient's request.      Referral/Consult From: (P) Rounding  Encounter Overview/Reason: (P) Spiritual/Emotional Needs  Encounter Code:     Crisis (if applicable):    Service Provided For: (P) Patient     Patient was not available. Patient declined visit but aware  can be notified as needed/desired    Val, Belief, Meaning:   Patient unable to assess at this time  Family/Friends   Rituals (if applicable)      Importance and Influence:  Patient unable to assess at this time  Family/Friends     Community:  Patient   unable to assess at this time   Family/Friends       Assessment and Plan of Care:   Emotions Expressed by Patient:   Assessment: (P) Unable to assess    Interventions by :   Intervention: (P) Sustaining Presence/Ministry of presence     Result/ Response by Patient:   Outcome: (P) Refused/Declined    Patient Plan of Care:   Plan and Referrals  Plan/Referrals: (P) Continue Support (comment)     Electronically signed by SUSANNA Quach, on 6/25/2025 at 12:12 PM.  Spiritual Health  [facility]  [phone]

## 2025-06-25 NOTE — PROGRESS NOTES
TRANSFER - IN REPORT:    Verbal report received from PETRA Bonilla on José Antonio Kyle being received from ED for routine progression of care.     Report consisted of patient’s Situation, Background, Assessment and Recommendations(SBAR).     Information from the following report(s) SBAR, ED Summary, and Intake/Output was reviewed. Opportunity for questions and clarification was provided.      Assessment completed upon patient’s arrival to unit and care assumed.     Patient received to room 410. Patient connected to monitor and assessment completed. Plan of care reviewed. Patient oriented to room and call light. Patient aware to use call light to communicate any chest pain or needs.     Pt will not allow RN to complete skin assessment.

## 2025-06-25 NOTE — CONSULTS
Physician Signature  This document was electronically signed by: Gilmar Parks MD      Consult Cover Page  FROM: [facility], [phone]  Call Back Number: [phone]  SUBJECT: Consult Recommendations  Date and Time of Report: 2025 12:16 AM ET  Items Contained in this Document: Neurology Consult Note    Consult Information  Member Facility: [facility] MRN: [id number]  Consult ID: [id number]  Facility Time Zone: ET  Date and Time of Request: 2025 11:46 PM ET  Requesting Clinician: Orestes Abreu DO  Patient Name: SHERIDAN FRANKLIN  YOB: 1980  Gender: Male  Patient identity was confirmed at the beginning of the consult with the patient/family/staff using two personal identifiers: Patient name and   Teleneurology Consultant: Gilmar Parks MD    Reason for Consult  Reason for Consult: Other Emergency    General  Chief Complaint: AMS  Patient Location and Admission Status: ED- Patient is not admitted  Family Members and Medical Staff Present: None  History of Present Illness: Patient is a 45M with PMH of recurrent DVT/PE on Eliquis (switched from Xarelto this month, per chart review), HLD who initially presented with chest pain and AMS. He has been having periods of staring off and unresponsiveness. He was given Ativan and Keppra and has been able to be aroused with sternal rub before dozing off again.      Limited ROS given mentation  Number of Documented HPI Elements: 4+    Medical History  Other Medical History: see hpi  Past Procedures: Unknown  Pertinent Family History: Epilepsy/Seizure  Other Family History: Mother absence seizure    Allergies  Allergies: Unknown    Medications  Anti-Coagulants: Eliquis (Apixaban)   Anti-Platelets: Unknown    Social History  Alcohol Use: Unknown  Illicit Drug Use: Unknown  Tobacco Use: Unknown    Family History  Pertinent Family History: Epilepsy/Seizure  Other Family History: Mother absence seizure    Vital

## 2025-06-25 NOTE — PROGRESS NOTES
NEW PATIENT ABSTRACT      Referral Diagnosis: Bilateral PE    Referring Provider  & Date of Referral: No ref. provider found    Primary Care Provider: Skyler Sage Jr., MD    Presenting Symptoms: Recurrent DVT/PE    Family History of Cancer: Cancer-related family history is not on file.    Past Medical History:   Past Medical History:   Diagnosis Date    Anxiety     GERD (gastroesophageal reflux disease)     pt treats with tums- resolves per pt- sleeps 1 pillow    Hypertension     managed with meds    NSTEMI (non-ST elevated myocardial infarction) (HCC) 8/14/2014    no stents- no angioplasty-- treatment with medication only    Other ill-defined conditions(799.89)     spirocytosis, high cholesterol    PUD (peptic ulcer disease) 2014    resolved per pt/ treated    Spherocytosis     Spherocytosis     URI (upper respiratory infection) 8/14/2014       Background Information:   Patient presented to [facility] ER 6/2/25 for chest pain and 6/24/25 with chest pain and unresponsiveness.    -Patient has hx of coronary artery disease. Has had cath in 2014 that showed RCA occlusion that was too small for PCI.  -Patient has new onset seizures. Did have 3 episodes in ER the first 15 minutes which were consistent with absence seizure's which patient was staring. Did not respond to painful stimulus. Was given 2 mg Ativan and 1 g of Keppra.   -Patient has hx of recurrent DVT and PE currently on Eliquis, anxiety with panic attacks, smoker and alcohol drinker, and HTN.   -Saw 2 Mutation not detected: 6/11/25  -MPN Panel to [email or web address]  and Thrombotic Venous Panel ordered on 6/2/25    Most Recent Tests:   6/2/25- CT Chest Pulmonary Embolism W/Contrast:  FINDINGS:  Lungs: Mild bilateral scar seen.     Vasculature: New small pulmonary emboli involving right lower lobe and left upper lobe in third order branches. Remaining bilateral pulmonary emboli appear similar to previous study.  No aorta calcification seen.   Coronary mild calcific

## 2025-06-25 NOTE — PROGRESS NOTES
Hospitalist Progress Note   Admit Date:  2025  8:08 PM   Name:  José Antonio Kyle   Age:  45 y.o.  Sex:  male  :  1980   MRN:  [id number]   Room:  [facility]/    Presenting/Chief Complaint: Chest Pain and Altered Mental Status     Reason(s) for Admission: Bilateral pulmonary embolism (HCC) [I26.99]  New onset seizure (HCC) [R56.9]  Chest pain, unspecified type [R07.9]     Hospital Course:   José Antonio Kyle is a 45 y.o. male with recurrent DVT and PE currently on Eliquis, anxiety with panic attacks, smoker and alcohol drinker, and HTN presented to the ER via EMS with substernal chest pain and was noted in EMS to have a period of staring off into space and being unresponsive.  Upon arrival to the ER he had another episode of staring off into space and was given Ativan and Keppra for possible seizure. Tele-neurology was consulted who recommended Keppra IV twice daily and admission for EEG.       Subjective & 24hr Events:   Patient seen and examined at bedside.  He is alert and awake, AO x 3, on room air.  Patient reports feeling lethargic but overall feeling better.  No further seizure-like activity overnight.  Denies any acute chest pain, nausea vomiting, headache, lightheadedness dizziness.  Currently on CIWA protocol as well.    ROS:  10 point review of system is negative except for what mentioned above.      Assessment & Plan:     Principal Problem:    New onset seizure (HCC)  -  Seizure likely from alcohol withdrawal.  Patient reports drinking heavily lately and has been trying to cut down over the past couple of days.  Patient with multiple episodes of staring off into space  Mother has history of absence seizures  Keppra 500 mg IV twice daily for now.  Continue Valium as needed  Check MRI brain with and without contrast  Consult in-house neurology for input and assistance  Defer EEG to in-house neurology     Active Problems:    Chest pain:   Resolved  Unsure etiology  Cardiac workup

## 2025-06-25 NOTE — ED PROVIDER NOTES
Emergency Department Provider Note       [facility] EMERGENCY DEPT   PCP: Skyler Sage Jr., MD   Age: 45 y.o.   Sex: male     DISPOSITION Decision To Admit 06/25/2025 12:22:52 AM    ICD-10-CM    1. New onset seizure (HCC)  R56.9       2. Chest pain, unspecified type  R07.9       3. Bilateral pulmonary embolism (HCC)  I26.99           Medical Decision Making     Differential diagnosis includes new onset seizures, ACS, recurrent PE, pregnancy, myocarditis, electrolyte abnormality, alcohol withdrawal seizures, partial list.    Patient presents with 2 separate issues.  1 being chest pain.  Has a history of coronary artery disease.  Troponins of 50 and 42.  These appear to be at his baseline but is still considered to be moderate risk given his history and moderate concern for his anginal chest heaviness that he presented with.    Also appears to have new onset seizures.  No personal history of seizures although mom does have absence seizure's.  Did have 3 episodes in the first 15 minutes which were consistent with absence seizure's which patient was staring.  Did not respond to painful stimulus.  Was given 2 mg Ativan and 1 g of Keppra.  Now appears to be very somnolent.  I would not immediately think that 2 mg Ativan would cause degree of somnolence.  I did add on drug screen and alcohol level as there may be something else on board that is complicating his picture.  He is not hypoxic so we will continue to observe him.  CT of the head is unremarkable.  I did reach out to neurology who recommends EEG but has no further recommendations for tonight.    I also spoke with cardiology who will see the patient in consultation.  Has had cath in 2014 that showed RCA occlusion that was too small for PCI.  ED Course as of 06/25/25 0023   e Jun 24, 2025 2027 Patient had an episode last approximately 30 seconds in which he was staring off, did not respond to painful stimuli, concerning for absence seizure's.  Had approximately 3

## 2025-06-25 NOTE — ED NOTES
Patient unable to stay awake  long enough to swallow pills. Medications will be held until he is more awake. MD aware.     Irene Garcia, RN  06/25/25 4587

## 2025-06-25 NOTE — CARE COORDINATION
Patient doesn't believe he struggles with ETOH. Says he only drinks socially. Patient was open to receiving [facility] resources.

## 2025-06-25 NOTE — CARE COORDINATION
Pt presented to the ED via EMS with substernal chest pain and was noted in EMS to have a period of staring off into space and being unresponsive. PMHx includes: HTN, recurrent DVTs, PEs, anxiety with panic attacks, current smoker and alcohol drinker. Ethol lvl at admission was 219. UDS not released yet. Pt is a readmission, was discharged on 6/4/25 and prior to that, 4/6/25. Readmission Assessment completed. CM consulted for 'consideration of rehab.' Writer contacted PSS with [facility] to f/u. Pt presented at obstinate and defensive when asking about his alcohol use, minimized his bx and deflected, said he \"only drinks on the weekends!\"  At baseline, pt is indep with his ADLs. Lives with his spouse and children in a one story private residence. On RA. A/Ox4. Drives. Works FT. Denies DME needs or current HH services. PCP established but has not seen recently. [employer] verified and able to afford home meds. No anticipated discharge needs identified by CM, will remain available.     06/25/25 0951   Service Assessment   Patient Orientation Alert and Oriented   Cognition Alert   History Provided By Patient   Primary Caregiver Self   Support Systems Spouse/Significant Other;Parent;Children;Family Members;Rastafari/Val Community;Friends/Neighbors   Patient's Healthcare Decision Maker is: Legal Next of Kin   PCP Verified by CM Yes   Last Visit to PCP Within last year   Prior Functional Level Independent in ADLs/IADLs   Current Functional Level Independent in ADLs/IADLs   Can patient return to prior living arrangement Yes   Ability to make needs known: Good   Family able to assist with home care needs: Yes   Would you like for me to discuss the discharge plan with any other family members/significant others, and if so, who? No   Financial Resources Other (Comment)  ([employer])   Community Resources None   Social/Functional History   Lives With Spouse;Family   Type of Home House   Home Layout One level

## 2025-06-25 NOTE — ED NOTES
TRANSFER - OUT REPORT:    Verbal report given to RN on José Antonio Kyle  being transferred to [facility] for routine progression of patient care       Report consisted of patient's Situation, Background, Assessment and   Recommendations(SBAR).     Information from the following report(s) Nurse Handoff Report was reviewed with the receiving nurse.    Sariah Fall Assessment:    Presents to emergency department  because of falls (Syncope, seizure, or loss of consciousness): No  Age > 70: No  Altered Mental Status, Intoxication with alcohol or substance confusion (Disorientation, impaired judgment, poor safety awaremess, or inability to follow instructions): Yes  Impaired Mobility: Ambulates or transfers with assistive devices or assistance; Unable to ambulate or transer.: No  Nursing Judgement: Yes          Lines:   Peripheral IV 06/24/25 Right Antecubital (Active)        Opportunity for questions and clarification was provided.      Patient transported with:  Monitor and Registered Nurse          Garcia, Irene, RN  06/25/25 2945

## 2025-06-25 NOTE — PROGRESS NOTES
Unable to complete patient admission - refusing to answer questions and will not allow primary RN to complete a skin assessment.

## 2025-06-25 NOTE — ED TRIAGE NOTES
Patient arrives via EMS from home with chest pain. Patient states this has been on going for about 3 hours. States took a couple nitro prior to Ems arrival. Ems states in route patient had episode of unresponsiveness. During triage paitent had another episode of unresponsiveness patient was no responding for about 30 seconds.

## 2025-06-25 NOTE — PROGRESS NOTES
H&P reviewed. The patient was examined and there are no changes to the H&P.         Educated patient on importance of using urinal as urine tests are ordered. Patient verbalizes understanding.     Patient also refusing complete skin assessment. Refuses to undress fully.

## 2025-06-26 ENCOUNTER — APPOINTMENT (OUTPATIENT)
Dept: MRI IMAGING | Age: 45
End: 2025-06-26
Payer: COMMERCIAL

## 2025-06-26 VITALS
BODY MASS INDEX: 21.69 KG/M2 | WEIGHT: 178.1 LBS | RESPIRATION RATE: 17 BRPM | OXYGEN SATURATION: 92 % | SYSTOLIC BLOOD PRESSURE: 126 MMHG | TEMPERATURE: 98.2 F | DIASTOLIC BLOOD PRESSURE: 89 MMHG | HEIGHT: 76 IN | HEART RATE: 89 BPM

## 2025-06-26 LAB
ALBUMIN SERPL-MCNC: 2.9 G/DL (ref 3.5–5)
ALBUMIN/GLOB SERPL: 1.1 (ref 1–1.9)
ALP SERPL-CCNC: 80 U/L (ref 40–129)
ALT SERPL-CCNC: 23 U/L (ref 8–55)
ANION GAP SERPL CALC-SCNC: 10 MMOL/L (ref 7–16)
AST SERPL-CCNC: 29 U/L (ref 15–37)
BILIRUB SERPL-MCNC: 3.6 MG/DL (ref 0–1.2)
BUN SERPL-MCNC: 7 MG/DL (ref 6–23)
CALCIUM SERPL-MCNC: 8.8 MG/DL (ref 8.8–10.2)
CHLORIDE SERPL-SCNC: 105 MMOL/L (ref 98–107)
CO2 SERPL-SCNC: 23 MMOL/L (ref 20–29)
CREAT SERPL-MCNC: 0.82 MG/DL (ref 0.8–1.3)
GLOBULIN SER CALC-MCNC: 2.7 G/DL (ref 2.3–3.5)
GLUCOSE SERPL-MCNC: 99 MG/DL (ref 70–99)
PHOSPHATE SERPL-MCNC: 3.1 MG/DL (ref 2.5–4.5)
POTASSIUM SERPL-SCNC: 3.7 MMOL/L (ref 3.5–5.1)
PROT SERPL-MCNC: 5.6 G/DL (ref 6.3–8.2)
SODIUM SERPL-SCNC: 138 MMOL/L (ref 136–145)

## 2025-06-26 PROCEDURE — 36415 COLL VENOUS BLD VENIPUNCTURE: CPT

## 2025-06-26 PROCEDURE — G0378 HOSPITAL OBSERVATION PER HR: HCPCS

## 2025-06-26 PROCEDURE — 96376 TX/PRO/DX INJ SAME DRUG ADON: CPT

## 2025-06-26 PROCEDURE — A9579 GAD-BASE MR CONTRAST NOS,1ML: HCPCS | Performed by: INTERNAL MEDICINE

## 2025-06-26 PROCEDURE — 2500000003 HC RX 250 WO HCPCS: Performed by: HOSPITALIST

## 2025-06-26 PROCEDURE — 70553 MRI BRAIN STEM W/O & W/DYE: CPT

## 2025-06-26 PROCEDURE — 6370000000 HC RX 637 (ALT 250 FOR IP): Performed by: INTERNAL MEDICINE

## 2025-06-26 PROCEDURE — 6370000000 HC RX 637 (ALT 250 FOR IP): Performed by: HOSPITALIST

## 2025-06-26 PROCEDURE — 84100 ASSAY OF PHOSPHORUS: CPT

## 2025-06-26 PROCEDURE — 6360000002 HC RX W HCPCS: Performed by: HOSPITALIST

## 2025-06-26 PROCEDURE — 6360000004 HC RX CONTRAST MEDICATION: Performed by: INTERNAL MEDICINE

## 2025-06-26 PROCEDURE — 2500000003 HC RX 250 WO HCPCS: Performed by: INTERNAL MEDICINE

## 2025-06-26 PROCEDURE — 80053 COMPREHEN METABOLIC PANEL: CPT

## 2025-06-26 RX ORDER — ASPIRIN 81 MG/1
325 TABLET, CHEWABLE ORAL DAILY
Qty: 120 TABLET | Refills: 3 | Status: ON HOLD | OUTPATIENT
Start: 2025-06-26 | End: 2025-07-26

## 2025-06-26 RX ORDER — PANTOPRAZOLE SODIUM 40 MG/1
40 TABLET, DELAYED RELEASE ORAL
Qty: 30 TABLET | Refills: 3 | Status: ON HOLD | OUTPATIENT
Start: 2025-06-26

## 2025-06-26 RX ORDER — GADOTERIDOL 279.3 MG/ML
16 INJECTION INTRAVENOUS
Status: COMPLETED | OUTPATIENT
Start: 2025-06-26 | End: 2025-06-26

## 2025-06-26 RX ORDER — FOLIC ACID 1 MG/1
1 TABLET ORAL DAILY
Qty: 90 TABLET | Refills: 1 | Status: ON HOLD | OUTPATIENT
Start: 2025-06-26

## 2025-06-26 RX ORDER — ISOSORBIDE MONONITRATE 30 MG/1
30 TABLET, EXTENDED RELEASE ORAL ONCE
Status: DISCONTINUED | OUTPATIENT
Start: 2025-06-26 | End: 2025-06-26 | Stop reason: HOSPADM

## 2025-06-26 RX ORDER — CARVEDILOL 3.12 MG/1
3.12 TABLET ORAL 2 TIMES DAILY WITH MEALS
Qty: 60 TABLET | Refills: 3 | Status: SHIPPED | OUTPATIENT
Start: 2025-06-26 | End: 2025-06-26

## 2025-06-26 RX ORDER — CARVEDILOL 3.12 MG/1
3.12 TABLET ORAL 2 TIMES DAILY WITH MEALS
Qty: 60 TABLET | Refills: 3 | Status: ON HOLD | OUTPATIENT
Start: 2025-06-26

## 2025-06-26 RX ORDER — PANTOPRAZOLE SODIUM 40 MG/1
40 TABLET, DELAYED RELEASE ORAL
Qty: 30 TABLET | Refills: 3 | Status: SHIPPED | OUTPATIENT
Start: 2025-06-26 | End: 2025-06-26

## 2025-06-26 RX ORDER — FOLIC ACID 1 MG/1
1 TABLET ORAL DAILY
Qty: 90 TABLET | Refills: 1 | Status: SHIPPED | OUTPATIENT
Start: 2025-06-26 | End: 2025-06-26

## 2025-06-26 RX ORDER — ASPIRIN 81 MG/1
325 TABLET, CHEWABLE ORAL DAILY
Qty: 120 TABLET | Refills: 3
Start: 2025-06-26 | End: 2025-06-26

## 2025-06-26 RX ORDER — CHLORDIAZEPOXIDE HYDROCHLORIDE 5 MG/1
5 CAPSULE, GELATIN COATED ORAL 3 TIMES DAILY
Qty: 9 CAPSULE | Refills: 0 | Status: SHIPPED | OUTPATIENT
Start: 2025-06-26 | End: 2025-06-29

## 2025-06-26 RX ORDER — NIFEDIPINE 30 MG/1
30 TABLET, EXTENDED RELEASE ORAL DAILY
Qty: 90 TABLET | Refills: 1 | Status: ON HOLD | OUTPATIENT
Start: 2025-06-26

## 2025-06-26 RX ADMIN — GADOTERIDOL 16 ML: 279.3 INJECTION, SOLUTION INTRAVENOUS at 11:24

## 2025-06-26 RX ADMIN — CARVEDILOL 3.12 MG: 3.12 TABLET, FILM COATED ORAL at 08:56

## 2025-06-26 RX ADMIN — LEVETIRACETAM 500 MG: 100 INJECTION INTRAVENOUS at 08:58

## 2025-06-26 RX ADMIN — FAMOTIDINE 20 MG: 10 INJECTION, SOLUTION INTRAVENOUS at 04:44

## 2025-06-26 RX ADMIN — SODIUM CHLORIDE, PRESERVATIVE FREE 10 ML: 5 INJECTION INTRAVENOUS at 08:58

## 2025-06-26 RX ADMIN — ASPIRIN 325 MG: 325 TABLET, FILM COATED ORAL at 08:56

## 2025-06-26 RX ADMIN — Medication 100 MG: at 08:56

## 2025-06-26 RX ADMIN — SODIUM CHLORIDE, PRESERVATIVE FREE 10 ML: 5 INJECTION INTRAVENOUS at 10:57

## 2025-06-26 RX ADMIN — PANTOPRAZOLE SODIUM 40 MG: 40 TABLET, DELAYED RELEASE ORAL at 04:44

## 2025-06-26 RX ADMIN — APIXABAN 5 MG: 5 TABLET, FILM COATED ORAL at 08:56

## 2025-06-26 RX ADMIN — LOSARTAN POTASSIUM 25 MG: 25 TABLET, FILM COATED ORAL at 08:56

## 2025-06-26 ASSESSMENT — PAIN SCALES - GENERAL: PAINLEVEL_OUTOF10: 0

## 2025-06-26 NOTE — PLAN OF CARE
Problem: Discharge Planning  Goal: Discharge to home or other facility with appropriate resources  6/26/2025 1115 by Maximo Vargas RN  Outcome: Completed  6/25/2025 2314 by Vita Vergara RN  Outcome: Progressing  Flowsheets (Taken 6/25/2025 0930 by Sussy Chan, PETRA)  Discharge to home or other facility with appropriate resources: Identify barriers to discharge with patient and caregiver     Problem: Seizure Precautions  Goal: Remains free of injury related to seizures activity  6/26/2025 1115 by Maximo Vargas RN  Outcome: Completed  6/25/2025 2314 by Vita Vergara RN  Outcome: Progressing     Problem: Pain  Goal: Verbalizes/displays adequate comfort level or baseline comfort level  Outcome: Completed     Problem: Safety - Adult  Goal: Free from fall injury  Outcome: Completed

## 2025-06-26 NOTE — DISCHARGE SUMMARY
Hospitalist Discharge Summary   Admit Date:  2025  8:08 PM   DC Note date: 2025  Name:  José Antonio Kyle   Age:  45 y.o.  Sex:  male  :  1980   MRN:  [id number]   Room:  [facility]  PCP:  Skyler Sage Jr., MD    Presenting Complaint: Chest Pain and Altered Mental Status     Initial Admission Diagnosis: Bilateral pulmonary embolism (HCC) [I26.99]  New onset seizure (HCC) [R56.9]  Chest pain, unspecified type [R07.9]     Problem List for this Hospitalization (present on admission):    Principal Problem:    New onset seizure (HCC)  Active Problems:    Benign essential HTN    Tobacco abuse    Pulmonary embolism (HCC)    Anxiety    Panic attacks    Hereditary spherocytosis    CAD (coronary artery disease)    Noncompliance    ETOH abuse    Recurrent deep vein thrombosis (DVT) (HCC)    Smoker    Chest pain  Resolved Problems:    * No resolved hospital problems. *      Hospital Course:  José Antonio Kyle is a 45 y.o. male with recurrent DVT and PE currently on Eliquis, anxiety with panic attacks, smoker and alcohol drinker, and HTN admitted on  with seizure like activity which is though to be due to alcohol withdrawal.  Pt reports drinking heavily over the past 1 week but had been trying to cut down over 2-3 days prior to coming to ER.  Pt reported having substernal chest discomfort on arrival which was thought to be due to gastritis/esophagitis from alcohol use.  EMS noted pt to have an episode of staring off into space, he was loaded with keppra and given ativan. Pt was continued on keppra on admission but was later discontinued as alcohol withdrawal is the likely etiology.  Pt was started on CIWA protocol, IV pepcid and protonix, thiamine and folic acid supplementation.  MRI brain with and without contrast was completed which was unremarkable for any acute pathology.  Chest pain has resolved.  Patient was continued on Eliquis 5 mg p.o. twice daily along with  mg p.o. daily.  Patient was

## 2025-06-26 NOTE — CARE COORDINATION
Discharge order is in. Pt is discharging home today in stable condition. [facility] provided resources to the pt for ethol cessation. No other discharge needs identified by CM. Tx goals met.     06/26/25 1100   Services At/After Discharge   Transition of Care Consult (CM Consult) Discharge Planning   Services At/After Discharge None    Resource Information Provided? No   Mode of Transport at Discharge Other (see comment)  (Family)   Confirm Follow Up Transport Family   Condition of Participation: Discharge Planning   The Patient and/or Patient Representative was provided with a Choice of Provider? Patient   The Patient and/Or Patient Representative agree with the Discharge Plan? Yes   Freedom of Choice list was provided with basic dialogue that supports the patient's individualized plan of care/goals, treatment preferences, and shares the quality data associated with the providers?  Yes

## 2025-06-26 NOTE — PLAN OF CARE
Discharge order is in, awaiting read on MRI of head, primary RN will notify Shahab ALFORD once it has been read. Pt is refusing his telemetry box to be put back on since he is expected to discharge soon.     1220: per Shahab ALFORD patient does not need to wait on MRI to be read before discharge, discharge has been completed.

## 2025-06-26 NOTE — PLAN OF CARE
Problem: Discharge Planning  Goal: Discharge to home or other facility with appropriate resources  Outcome: Progressing  Flowsheets (Taken 6/25/2025 0930 by Sussy Chan, PETRA)  Discharge to home or other facility with appropriate resources: Identify barriers to discharge with patient and caregiver     Problem: Seizure Precautions  Goal: Remains free of injury related to seizures activity  Outcome: Progressing

## 2025-06-27 ENCOUNTER — CLINICAL DOCUMENTATION (OUTPATIENT)
Dept: ONCOLOGY | Age: 45
End: 2025-06-27

## 2025-07-06 ENCOUNTER — APPOINTMENT (OUTPATIENT)
Dept: NON INVASIVE DIAGNOSTICS | Age: 45
DRG: 175 | End: 2025-07-06
Attending: HOSPITALIST
Payer: COMMERCIAL

## 2025-07-06 ENCOUNTER — APPOINTMENT (OUTPATIENT)
Dept: CT IMAGING | Age: 45
DRG: 175 | End: 2025-07-06
Payer: COMMERCIAL

## 2025-07-06 ENCOUNTER — HOSPITAL ENCOUNTER (INPATIENT)
Age: 45
LOS: 3 days | Discharge: HOME | DRG: 175 | End: 2025-07-09
Attending: EMERGENCY MEDICINE | Admitting: HOSPITALIST
Payer: COMMERCIAL

## 2025-07-06 ENCOUNTER — APPOINTMENT (OUTPATIENT)
Dept: GENERAL RADIOLOGY | Age: 45
DRG: 175 | End: 2025-07-06
Payer: COMMERCIAL

## 2025-07-06 DIAGNOSIS — R52 PAIN: ICD-10-CM

## 2025-07-06 DIAGNOSIS — I27.82 CHRONIC PULMONARY EMBOLISM, UNSPECIFIED PULMONARY EMBOLISM TYPE, UNSPECIFIED WHETHER ACUTE COR PULMONALE PRESENT (HCC): ICD-10-CM

## 2025-07-06 DIAGNOSIS — R07.9 CHEST PAIN, UNSPECIFIED TYPE: Primary | ICD-10-CM

## 2025-07-06 DIAGNOSIS — I26.02 ACUTE SADDLE PULMONARY EMBOLISM WITH ACUTE COR PULMONALE (HCC): ICD-10-CM

## 2025-07-06 DIAGNOSIS — R07.9 ACUTE CHEST PAIN: ICD-10-CM

## 2025-07-06 DIAGNOSIS — R06.09 DYSPNEA ON EXERTION: ICD-10-CM

## 2025-07-06 DIAGNOSIS — R06.02 SHORTNESS OF BREATH: ICD-10-CM

## 2025-07-06 PROBLEM — I20.0 UNSTABLE ANGINA (HCC): Status: ACTIVE | Noted: 2025-07-06

## 2025-07-06 LAB
ALBUMIN SERPL-MCNC: 3.6 G/DL (ref 3.5–5)
ALBUMIN/GLOB SERPL: 1 (ref 1–1.9)
ALP SERPL-CCNC: 100 U/L (ref 40–129)
ALT SERPL-CCNC: 24 U/L (ref 8–55)
ANION GAP SERPL CALC-SCNC: 16 MMOL/L (ref 7–16)
APTT PPP: 28 SEC (ref 23.3–37.4)
APTT PPP: >200 SEC (ref 23.3–37.4)
AST SERPL-CCNC: 39 U/L (ref 15–37)
B PERT DNA SPEC QL NAA+PROBE: NOT DETECTED
BASE DEFICIT BLDV-SCNC: 0.9 MMOL/L
BASOPHILS # BLD: 0.16 K/UL (ref 0–0.2)
BASOPHILS NFR BLD: 1.2 % (ref 0–2)
BILIRUB SERPL-MCNC: 3.4 MG/DL (ref 0–1.2)
BORDETELLA PARAPERTUSSIS BY PCR: NOT DETECTED
BUN SERPL-MCNC: 7 MG/DL (ref 6–23)
C PNEUM DNA SPEC QL NAA+PROBE: NOT DETECTED
CALCIUM SERPL-MCNC: 9.3 MG/DL (ref 8.8–10.2)
CHLORIDE SERPL-SCNC: 101 MMOL/L (ref 98–107)
CHOLEST SERPL-MCNC: 125 MG/DL (ref 0–200)
CO2 SERPL-SCNC: 22 MMOL/L (ref 20–29)
CREAT SERPL-MCNC: 1.01 MG/DL (ref 0.8–1.3)
DIFFERENTIAL METHOD BLD: ABNORMAL
ECHO AO ROOT DIAM: 3.4 CM
ECHO AO ROOT INDEX: 1.7 CM/M2
ECHO AV AREA PEAK VELOCITY: 2.5 CM2
ECHO AV AREA VTI: 3.1 CM2
ECHO AV AREA/BSA PEAK VELOCITY: 1.3 CM2/M2
ECHO AV AREA/BSA VTI: 1.6 CM2/M2
ECHO AV MEAN GRADIENT: 2 MMHG
ECHO AV MEAN VELOCITY: 0.8 M/S
ECHO AV PEAK GRADIENT: 4 MMHG
ECHO AV PEAK GRADIENT: 4 MMHG
ECHO AV PEAK VELOCITY: 1 M/S
ECHO AV VELOCITY RATIO: 0.8
ECHO AV VTI: 13 CM
ECHO BSA: 1.98 M2
ECHO IVC PROX: 1.9 CM
ECHO LA DIAMETER INDEX: 1.55 CM/M2
ECHO LA DIAMETER: 3.1 CM
ECHO LA TO AORTIC ROOT RATIO: 0.91
ECHO LV E' LATERAL VELOCITY: 11.2 CM/S
ECHO LV E' SEPTAL VELOCITY: 4.57 CM/S
ECHO LV EDV A2C: 61 ML
ECHO LV EDV A4C: 33 ML
ECHO LV EDV INDEX A4C: 17 ML/M2
ECHO LV EDV NDEX A2C: 31 ML/M2
ECHO LV EF PHYSICIAN: 55 %
ECHO LV EJECTION FRACTION A2C: 56 %
ECHO LV EJECTION FRACTION A4C: 51 %
ECHO LV EJECTION FRACTION BIPLANE: 53 % (ref 55–100)
ECHO LV ESV A2C: 27 ML
ECHO LV ESV A4C: 16 ML
ECHO LV ESV INDEX A2C: 14 ML/M2
ECHO LV ESV INDEX A4C: 8 ML/M2
ECHO LV FRACTIONAL SHORTENING: 32 % (ref 28–44)
ECHO LV INTERNAL DIMENSION DIASTOLE INDEX: 2.2 CM/M2
ECHO LV INTERNAL DIMENSION DIASTOLIC: 4.4 CM (ref 4.2–5.9)
ECHO LV INTERNAL DIMENSION SYSTOLIC INDEX: 1.5 CM/M2
ECHO LV INTERNAL DIMENSION SYSTOLIC: 3 CM
ECHO LV IVSD: 1 CM (ref 0.6–1)
ECHO LV MASS 2D: 147.8 G (ref 88–224)
ECHO LV MASS INDEX 2D: 73.9 G/M2 (ref 49–115)
ECHO LV POSTERIOR WALL DIASTOLIC: 1 CM (ref 0.6–1)
ECHO LV RELATIVE WALL THICKNESS RATIO: 0.45
ECHO LVOT AREA: 3.5 CM2
ECHO LVOT AV VTI INDEX: 0.91
ECHO LVOT DIAM: 2.1 CM
ECHO LVOT MEAN GRADIENT: 1 MMHG
ECHO LVOT PEAK GRADIENT: 2 MMHG
ECHO LVOT PEAK VELOCITY: 0.8 M/S
ECHO LVOT STROKE VOLUME INDEX: 20.4 ML/M2
ECHO LVOT SV: 40.8 ML
ECHO LVOT VTI: 11.8 CM
ECHO MV A VELOCITY: 0.77 M/S
ECHO MV AREA VTI: 3 CM2
ECHO MV E DECELERATION TIME (DT): 129 MS
ECHO MV E VELOCITY: 0.39 M/S
ECHO MV E/A RATIO: 0.51
ECHO MV E/E' LATERAL: 3.48
ECHO MV E/E' RATIO (AVERAGED): 6.01
ECHO MV E/E' SEPTAL: 8.53
ECHO MV LVOT VTI INDEX: 1.15
ECHO MV MAX VELOCITY: 0.9 M/S
ECHO MV MEAN GRADIENT: 1 MMHG
ECHO MV MEAN VELOCITY: 0.5 M/S
ECHO MV PEAK GRADIENT: 4 MMHG
ECHO MV VTI: 13.6 CM
ECHO PV ACCELERATION TIME (AT): 55 MS
ECHO PV MAX VELOCITY: 0.7 M/S
ECHO PV PEAK GRADIENT: 2 MMHG
ECHO RV BASAL DIMENSION: 4.6 CM
ECHO RV FREE WALL PEAK S': 6.6 CM/S
ECHO TV REGURGITANT MAX VELOCITY: 4.24 M/S
ECHO TV REGURGITANT PEAK GRADIENT: 72 MMHG
ECHO TV REGURGITANT PEAK GRADIENT: 72 MMHG
EKG ATRIAL RATE: 119 BPM
EKG DIAGNOSIS: NORMAL
EKG P AXIS: 72 DEGREES
EKG P-R INTERVAL: 136 MS
EKG Q-T INTERVAL: 322 MS
EKG QRS DURATION: 82 MS
EKG QTC CALCULATION (BAZETT): 452 MS
EKG R AXIS: 175 DEGREES
EKG T AXIS: 23 DEGREES
EKG VENTRICULAR RATE: 119 BPM
EOSINOPHIL # BLD: 0.17 K/UL (ref 0–0.8)
EOSINOPHIL NFR BLD: 1.3 % (ref 0.5–7.8)
ERYTHROCYTE [DISTWIDTH] IN BLOOD BY AUTOMATED COUNT: 16.9 % (ref 11.9–14.6)
ETHANOL SERPL-MCNC: <11 MG/DL (ref 0–0.08)
FLUAV SUBTYP SPEC NAA+PROBE: NOT DETECTED
FLUBV RNA SPEC QL NAA+PROBE: NOT DETECTED
GLOBULIN SER CALC-MCNC: 3.5 G/DL (ref 2.3–3.5)
GLUCOSE BLD STRIP.AUTO-MCNC: 146 MG/DL (ref 65–100)
GLUCOSE SERPL-MCNC: 96 MG/DL (ref 70–99)
HADV DNA SPEC QL NAA+PROBE: NOT DETECTED
HCO3 BLDV-SCNC: 25.6 MMOL/L (ref 22–29)
HCOV 229E RNA SPEC QL NAA+PROBE: NOT DETECTED
HCOV HKU1 RNA SPEC QL NAA+PROBE: NOT DETECTED
HCOV NL63 RNA SPEC QL NAA+PROBE: NOT DETECTED
HCOV OC43 RNA SPEC QL NAA+PROBE: NOT DETECTED
HCT VFR BLD AUTO: 45.7 % (ref 41.1–50.3)
HCT VFR BLD AUTO: 47.8 % (ref 41.1–50.3)
HCT VFR BLD AUTO: 52.4 % (ref 41.1–50.3)
HDLC SERPL-MCNC: 32 MG/DL (ref 40–60)
HDLC SERPL: 3.9 (ref 0–5)
HGB BLD-MCNC: 16.4 G/DL (ref 13.6–17.2)
HGB BLD-MCNC: 17.2 G/DL (ref 13.6–17.2)
HGB BLD-MCNC: 19 G/DL (ref 13.6–17.2)
HMPV RNA SPEC QL NAA+PROBE: NOT DETECTED
HPIV1 RNA SPEC QL NAA+PROBE: NOT DETECTED
HPIV2 RNA SPEC QL NAA+PROBE: NOT DETECTED
HPIV3 RNA SPEC QL NAA+PROBE: NOT DETECTED
HPIV4 RNA SPEC QL NAA+PROBE: NOT DETECTED
IMM GRANULOCYTES # BLD AUTO: 0.05 K/UL (ref 0–0.5)
IMM GRANULOCYTES NFR BLD AUTO: 0.4 % (ref 0–5)
INR PPP: 1.2
INR PPP: 1.3
LDLC SERPL CALC-MCNC: 74 MG/DL (ref 0–100)
LYMPHOCYTES # BLD: 3.94 K/UL (ref 0.5–4.6)
LYMPHOCYTES NFR BLD: 29 % (ref 13–44)
M PNEUMO DNA SPEC QL NAA+PROBE: NOT DETECTED
MAGNESIUM SERPL-MCNC: 1.6 MG/DL (ref 1.8–2.4)
MCH RBC QN AUTO: 39 PG (ref 26.1–32.9)
MCH RBC QN AUTO: 39.4 PG (ref 26.1–32.9)
MCH RBC QN AUTO: 40.4 PG (ref 26.1–32.9)
MCHC RBC AUTO-ENTMCNC: 35.9 G/DL (ref 31.4–35)
MCHC RBC AUTO-ENTMCNC: 36 G/DL (ref 31.4–35)
MCHC RBC AUTO-ENTMCNC: 36.3 G/DL (ref 31.4–35)
MCV RBC AUTO: 107.6 FL (ref 82–102)
MCV RBC AUTO: 109.6 FL (ref 82–102)
MCV RBC AUTO: 112.6 FL (ref 82–102)
MONOCYTES # BLD: 0.93 K/UL (ref 0.1–1.3)
MONOCYTES NFR BLD: 6.8 % (ref 4–12)
NEUTS SEG # BLD: 8.34 K/UL (ref 1.7–8.2)
NEUTS SEG NFR BLD: 61.3 % (ref 43–78)
NRBC # BLD: 0.06 K/UL (ref 0–0.2)
NRBC # BLD: 0.07 K/UL (ref 0–0.2)
NRBC # BLD: 0.08 K/UL (ref 0–0.2)
NT PRO BNP: 3390 PG/ML (ref 0–125)
PCO2 BLDV: 48.2 MMHG (ref 41–51)
PH BLDV: 7.33 (ref 7.32–7.42)
PLATELET # BLD AUTO: 200 K/UL (ref 150–450)
PLATELET # BLD AUTO: 207 K/UL (ref 150–450)
PLATELET # BLD AUTO: 208 K/UL (ref 150–450)
PMV BLD AUTO: 10.1 FL (ref 9.4–12.3)
PMV BLD AUTO: 9.6 FL (ref 9.4–12.3)
PMV BLD AUTO: 9.8 FL (ref 9.4–12.3)
PO2 BLDV: 21 MMHG
POTASSIUM SERPL-SCNC: 3.4 MMOL/L (ref 3.5–5.1)
PROT SERPL-MCNC: 7 G/DL (ref 6.3–8.2)
PROTHROMBIN TIME: 15.9 SEC (ref 11.3–14.9)
PROTHROMBIN TIME: 17.1 SEC (ref 11.3–14.9)
RBC # BLD AUTO: 4.06 M/UL (ref 4.23–5.6)
RBC # BLD AUTO: 4.36 M/UL (ref 4.23–5.6)
RBC # BLD AUTO: 4.87 M/UL (ref 4.23–5.6)
RSV RNA SPEC QL NAA+PROBE: NOT DETECTED
RV+EV RNA SPEC QL NAA+PROBE: NOT DETECTED
SAO2 % BLDV: 31.5 % (ref 65–88)
SARS-COV-2 RNA RESP QL NAA+PROBE: NOT DETECTED
SERVICE CMNT-IMP: ABNORMAL
SODIUM SERPL-SCNC: 139 MMOL/L (ref 136–145)
SPECIMEN TYPE: ABNORMAL
TRIGL SERPL-MCNC: 92 MG/DL (ref 0–150)
TROPONIN T SERPL HS-MCNC: 285 NG/L (ref 0–22)
TROPONIN T SERPL HS-MCNC: 296 NG/L (ref 0–22)
TROPONIN T SERPL HS-MCNC: 308 NG/L (ref 0–22)
TROPONIN T SERPL HS-MCNC: 315 NG/L (ref 0–22)
TROPONIN T SERPL HS-MCNC: 326 NG/L (ref 0–22)
UFH PPP CHRO-ACNC: >1.1 IU/ML (ref 0.3–0.7)
UFH PPP CHRO-ACNC: >1.1 IU/ML (ref 0.3–0.7)
VLDLC SERPL CALC-MCNC: 18 MG/DL (ref 6–23)
WBC # BLD AUTO: 12.7 K/UL (ref 4.3–11.1)
WBC # BLD AUTO: 13.6 K/UL (ref 4.3–11.1)
WBC # BLD AUTO: 15 K/UL (ref 4.3–11.1)

## 2025-07-06 PROCEDURE — 96375 TX/PRO/DX INJ NEW DRUG ADDON: CPT

## 2025-07-06 PROCEDURE — 83735 ASSAY OF MAGNESIUM: CPT

## 2025-07-06 PROCEDURE — 71046 X-RAY EXAM CHEST 2 VIEWS: CPT

## 2025-07-06 PROCEDURE — 0202U NFCT DS 22 TRGT SARS-COV-2: CPT

## 2025-07-06 PROCEDURE — 6360000004 HC RX CONTRAST MEDICATION: Performed by: EMERGENCY MEDICINE

## 2025-07-06 PROCEDURE — 94640 AIRWAY INHALATION TREATMENT: CPT

## 2025-07-06 PROCEDURE — 6370000000 HC RX 637 (ALT 250 FOR IP): Performed by: HOSPITALIST

## 2025-07-06 PROCEDURE — 80053 COMPREHEN METABOLIC PANEL: CPT

## 2025-07-06 PROCEDURE — 99285 EMERGENCY DEPT VISIT HI MDM: CPT

## 2025-07-06 PROCEDURE — 6360000002 HC RX W HCPCS: Performed by: EMERGENCY MEDICINE

## 2025-07-06 PROCEDURE — 6360000002 HC RX W HCPCS: Performed by: HOSPITALIST

## 2025-07-06 PROCEDURE — 36415 COLL VENOUS BLD VENIPUNCTURE: CPT

## 2025-07-06 PROCEDURE — 2060000000 HC ICU INTERMEDIATE R&B

## 2025-07-06 PROCEDURE — 2500000003 HC RX 250 WO HCPCS: Performed by: HOSPITALIST

## 2025-07-06 PROCEDURE — 96376 TX/PRO/DX INJ SAME DRUG ADON: CPT

## 2025-07-06 PROCEDURE — 82077 ASSAY SPEC XCP UR&BREATH IA: CPT

## 2025-07-06 PROCEDURE — 80061 LIPID PANEL: CPT

## 2025-07-06 PROCEDURE — 82803 BLOOD GASES ANY COMBINATION: CPT

## 2025-07-06 PROCEDURE — 94761 N-INVAS EAR/PLS OXIMETRY MLT: CPT

## 2025-07-06 PROCEDURE — 99255 IP/OBS CONSLTJ NEW/EST HI 80: CPT | Performed by: INTERNAL MEDICINE

## 2025-07-06 PROCEDURE — 96374 THER/PROPH/DIAG INJ IV PUSH: CPT

## 2025-07-06 PROCEDURE — 83880 ASSAY OF NATRIURETIC PEPTIDE: CPT

## 2025-07-06 PROCEDURE — 2580000003 HC RX 258: Performed by: EMERGENCY MEDICINE

## 2025-07-06 PROCEDURE — 93306 TTE W/DOPPLER COMPLETE: CPT

## 2025-07-06 PROCEDURE — 85025 COMPLETE CBC W/AUTO DIFF WBC: CPT

## 2025-07-06 PROCEDURE — 93005 ELECTROCARDIOGRAM TRACING: CPT | Performed by: EMERGENCY MEDICINE

## 2025-07-06 PROCEDURE — 82962 GLUCOSE BLOOD TEST: CPT

## 2025-07-06 PROCEDURE — 71260 CT THORAX DX C+: CPT

## 2025-07-06 PROCEDURE — 85610 PROTHROMBIN TIME: CPT

## 2025-07-06 PROCEDURE — 85520 HEPARIN ASSAY: CPT

## 2025-07-06 PROCEDURE — 85027 COMPLETE CBC AUTOMATED: CPT

## 2025-07-06 PROCEDURE — 93306 TTE W/DOPPLER COMPLETE: CPT | Performed by: INTERNAL MEDICINE

## 2025-07-06 PROCEDURE — 93010 ELECTROCARDIOGRAM REPORT: CPT | Performed by: INTERNAL MEDICINE

## 2025-07-06 PROCEDURE — 84484 ASSAY OF TROPONIN QUANT: CPT

## 2025-07-06 PROCEDURE — 2700000000 HC OXYGEN THERAPY PER DAY

## 2025-07-06 PROCEDURE — 85730 THROMBOPLASTIN TIME PARTIAL: CPT

## 2025-07-06 RX ORDER — ONDANSETRON 2 MG/ML
4 INJECTION INTRAMUSCULAR; INTRAVENOUS
Status: COMPLETED | OUTPATIENT
Start: 2025-07-06 | End: 2025-07-06

## 2025-07-06 RX ORDER — ACETAMINOPHEN 325 MG/1
650 TABLET ORAL EVERY 6 HOURS PRN
Status: DISCONTINUED | OUTPATIENT
Start: 2025-07-06 | End: 2025-07-09 | Stop reason: HOSPADM

## 2025-07-06 RX ORDER — FOLIC ACID 1 MG/1
1 TABLET ORAL DAILY
Status: DISCONTINUED | OUTPATIENT
Start: 2025-07-06 | End: 2025-07-09 | Stop reason: HOSPADM

## 2025-07-06 RX ORDER — PROMETHAZINE HYDROCHLORIDE 25 MG/1
25 TABLET ORAL EVERY 6 HOURS PRN
Status: DISCONTINUED | OUTPATIENT
Start: 2025-07-06 | End: 2025-07-09 | Stop reason: HOSPADM

## 2025-07-06 RX ORDER — MAGNESIUM SULFATE IN WATER 40 MG/ML
4000 INJECTION, SOLUTION INTRAVENOUS ONCE
Status: COMPLETED | OUTPATIENT
Start: 2025-07-06 | End: 2025-07-06

## 2025-07-06 RX ORDER — MAGNESIUM SULFATE IN WATER 40 MG/ML
2000 INJECTION, SOLUTION INTRAVENOUS PRN
Status: DISCONTINUED | OUTPATIENT
Start: 2025-07-06 | End: 2025-07-09 | Stop reason: HOSPADM

## 2025-07-06 RX ORDER — MORPHINE SULFATE 2 MG/ML
2 INJECTION, SOLUTION INTRAMUSCULAR; INTRAVENOUS EVERY 4 HOURS PRN
Status: DISCONTINUED | OUTPATIENT
Start: 2025-07-06 | End: 2025-07-09 | Stop reason: HOSPADM

## 2025-07-06 RX ORDER — SODIUM CHLORIDE 0.9 % (FLUSH) 0.9 %
5-40 SYRINGE (ML) INJECTION EVERY 12 HOURS SCHEDULED
Status: DISCONTINUED | OUTPATIENT
Start: 2025-07-06 | End: 2025-07-09 | Stop reason: HOSPADM

## 2025-07-06 RX ORDER — HEPARIN SODIUM 1000 [USP'U]/ML
80 INJECTION, SOLUTION INTRAVENOUS; SUBCUTANEOUS ONCE
Status: COMPLETED | OUTPATIENT
Start: 2025-07-06 | End: 2025-07-06

## 2025-07-06 RX ORDER — LANOLIN ALCOHOL/MO/W.PET/CERES
100 CREAM (GRAM) TOPICAL DAILY
Status: DISCONTINUED | OUTPATIENT
Start: 2025-07-06 | End: 2025-07-09 | Stop reason: HOSPADM

## 2025-07-06 RX ORDER — POLYETHYLENE GLYCOL 3350 17 G/17G
17 POWDER, FOR SOLUTION ORAL DAILY PRN
Status: DISCONTINUED | OUTPATIENT
Start: 2025-07-06 | End: 2025-07-09 | Stop reason: HOSPADM

## 2025-07-06 RX ORDER — SODIUM CHLORIDE 9 MG/ML
INJECTION, SOLUTION INTRAVENOUS PRN
Status: DISCONTINUED | OUTPATIENT
Start: 2025-07-06 | End: 2025-07-09 | Stop reason: HOSPADM

## 2025-07-06 RX ORDER — HEPARIN SODIUM 1000 [USP'U]/ML
4000 INJECTION, SOLUTION INTRAVENOUS; SUBCUTANEOUS PRN
Status: DISCONTINUED | OUTPATIENT
Start: 2025-07-06 | End: 2025-07-06

## 2025-07-06 RX ORDER — IOPAMIDOL 755 MG/ML
100 INJECTION, SOLUTION INTRAVASCULAR
Status: COMPLETED | OUTPATIENT
Start: 2025-07-06 | End: 2025-07-06

## 2025-07-06 RX ORDER — CARVEDILOL 6.25 MG/1
6.25 TABLET ORAL 2 TIMES DAILY WITH MEALS
Status: DISCONTINUED | OUTPATIENT
Start: 2025-07-06 | End: 2025-07-09 | Stop reason: HOSPADM

## 2025-07-06 RX ORDER — SODIUM CHLORIDE, SODIUM LACTATE, POTASSIUM CHLORIDE, CALCIUM CHLORIDE 600; 310; 30; 20 MG/100ML; MG/100ML; MG/100ML; MG/100ML
INJECTION, SOLUTION INTRAVENOUS CONTINUOUS
Status: ACTIVE | OUTPATIENT
Start: 2025-07-06 | End: 2025-07-07

## 2025-07-06 RX ORDER — HEPARIN SODIUM 10000 [USP'U]/100ML
5-30 INJECTION, SOLUTION INTRAVENOUS CONTINUOUS
Status: DISCONTINUED | OUTPATIENT
Start: 2025-07-06 | End: 2025-07-06

## 2025-07-06 RX ORDER — POTASSIUM CHLORIDE 1500 MG/1
40 TABLET, EXTENDED RELEASE ORAL
Status: COMPLETED | OUTPATIENT
Start: 2025-07-06 | End: 2025-07-06

## 2025-07-06 RX ORDER — MORPHINE SULFATE 4 MG/ML
4 INJECTION, SOLUTION INTRAMUSCULAR; INTRAVENOUS
Refills: 0 | Status: COMPLETED | OUTPATIENT
Start: 2025-07-06 | End: 2025-07-06

## 2025-07-06 RX ORDER — HEPARIN SODIUM 1000 [USP'U]/ML
2000 INJECTION, SOLUTION INTRAVENOUS; SUBCUTANEOUS PRN
Status: DISCONTINUED | OUTPATIENT
Start: 2025-07-06 | End: 2025-07-06

## 2025-07-06 RX ORDER — ACETAMINOPHEN 650 MG/1
650 SUPPOSITORY RECTAL EVERY 6 HOURS PRN
Status: DISCONTINUED | OUTPATIENT
Start: 2025-07-06 | End: 2025-07-09 | Stop reason: HOSPADM

## 2025-07-06 RX ORDER — HEPARIN SODIUM 10000 [USP'U]/100ML
5-30 INJECTION, SOLUTION INTRAVENOUS CONTINUOUS
Status: DISCONTINUED | OUTPATIENT
Start: 2025-07-06 | End: 2025-07-09

## 2025-07-06 RX ORDER — NITROGLYCERIN 0.4 MG/1
0.4 TABLET SUBLINGUAL PRN
Status: DISCONTINUED | OUTPATIENT
Start: 2025-07-06 | End: 2025-07-09 | Stop reason: HOSPADM

## 2025-07-06 RX ORDER — IPRATROPIUM BROMIDE AND ALBUTEROL SULFATE 2.5; .5 MG/3ML; MG/3ML
1 SOLUTION RESPIRATORY (INHALATION) EVERY 4 HOURS PRN
Status: DISCONTINUED | OUTPATIENT
Start: 2025-07-06 | End: 2025-07-09 | Stop reason: HOSPADM

## 2025-07-06 RX ORDER — HEPARIN SODIUM 1000 [USP'U]/ML
80 INJECTION, SOLUTION INTRAVENOUS; SUBCUTANEOUS PRN
Status: DISCONTINUED | OUTPATIENT
Start: 2025-07-06 | End: 2025-07-09

## 2025-07-06 RX ORDER — ASPIRIN 81 MG/1
81 TABLET, CHEWABLE ORAL DAILY
Status: DISCONTINUED | OUTPATIENT
Start: 2025-07-06 | End: 2025-07-09 | Stop reason: HOSPADM

## 2025-07-06 RX ORDER — SENNA AND DOCUSATE SODIUM 50; 8.6 MG/1; MG/1
1 TABLET, FILM COATED ORAL 2 TIMES DAILY
Status: DISCONTINUED | OUTPATIENT
Start: 2025-07-06 | End: 2025-07-09 | Stop reason: HOSPADM

## 2025-07-06 RX ORDER — NICOTINE 21 MG/24HR
1 PATCH, TRANSDERMAL 24 HOURS TRANSDERMAL DAILY
Status: DISCONTINUED | OUTPATIENT
Start: 2025-07-06 | End: 2025-07-09 | Stop reason: HOSPADM

## 2025-07-06 RX ORDER — ATORVASTATIN CALCIUM 40 MG/1
80 TABLET, FILM COATED ORAL NIGHTLY
Status: DISCONTINUED | OUTPATIENT
Start: 2025-07-06 | End: 2025-07-09 | Stop reason: HOSPADM

## 2025-07-06 RX ORDER — ARFORMOTEROL TARTRATE 15 UG/2ML
15 SOLUTION RESPIRATORY (INHALATION)
Status: DISCONTINUED | OUTPATIENT
Start: 2025-07-06 | End: 2025-07-07

## 2025-07-06 RX ORDER — POTASSIUM CHLORIDE 7.45 MG/ML
10 INJECTION INTRAVENOUS PRN
Status: DISCONTINUED | OUTPATIENT
Start: 2025-07-06 | End: 2025-07-09 | Stop reason: HOSPADM

## 2025-07-06 RX ORDER — POTASSIUM CHLORIDE 1500 MG/1
40 TABLET, EXTENDED RELEASE ORAL PRN
Status: DISCONTINUED | OUTPATIENT
Start: 2025-07-06 | End: 2025-07-09 | Stop reason: HOSPADM

## 2025-07-06 RX ORDER — BUDESONIDE 0.5 MG/2ML
0.5 INHALANT ORAL
Status: DISCONTINUED | OUTPATIENT
Start: 2025-07-06 | End: 2025-07-07

## 2025-07-06 RX ORDER — HEPARIN SODIUM 1000 [USP'U]/ML
40 INJECTION, SOLUTION INTRAVENOUS; SUBCUTANEOUS PRN
Status: DISCONTINUED | OUTPATIENT
Start: 2025-07-06 | End: 2025-07-09

## 2025-07-06 RX ORDER — ONDANSETRON 2 MG/ML
4 INJECTION INTRAMUSCULAR; INTRAVENOUS EVERY 4 HOURS PRN
Status: DISCONTINUED | OUTPATIENT
Start: 2025-07-06 | End: 2025-07-09 | Stop reason: HOSPADM

## 2025-07-06 RX ORDER — 0.9 % SODIUM CHLORIDE 0.9 %
500 INTRAVENOUS SOLUTION INTRAVENOUS ONCE
Status: COMPLETED | OUTPATIENT
Start: 2025-07-06 | End: 2025-07-06

## 2025-07-06 RX ORDER — SODIUM CHLORIDE 0.9 % (FLUSH) 0.9 %
5-40 SYRINGE (ML) INJECTION PRN
Status: DISCONTINUED | OUTPATIENT
Start: 2025-07-06 | End: 2025-07-09 | Stop reason: HOSPADM

## 2025-07-06 RX ADMIN — POTASSIUM CHLORIDE 40 MEQ: 1500 TABLET, EXTENDED RELEASE ORAL at 16:04

## 2025-07-06 RX ADMIN — POTASSIUM CHLORIDE 40 MEQ: 1500 TABLET, EXTENDED RELEASE ORAL at 17:49

## 2025-07-06 RX ADMIN — MORPHINE SULFATE 4 MG: 4 INJECTION INTRAVENOUS at 14:02

## 2025-07-06 RX ADMIN — ARFORMOTEROL TARTRATE 15 MCG: 15 SOLUTION RESPIRATORY (INHALATION) at 21:09

## 2025-07-06 RX ADMIN — MAGNESIUM SULFATE HEPTAHYDRATE 4000 MG: 40 INJECTION, SOLUTION INTRAVENOUS at 16:45

## 2025-07-06 RX ADMIN — SODIUM CHLORIDE, PRESERVATIVE FREE 10 ML: 5 INJECTION INTRAVENOUS at 20:38

## 2025-07-06 RX ADMIN — ONDANSETRON 4 MG: 2 INJECTION, SOLUTION INTRAMUSCULAR; INTRAVENOUS at 11:59

## 2025-07-06 RX ADMIN — MORPHINE SULFATE 2 MG: 2 INJECTION, SOLUTION INTRAMUSCULAR; INTRAVENOUS at 17:49

## 2025-07-06 RX ADMIN — FOLIC ACID 1 MG: 1 TABLET ORAL at 16:27

## 2025-07-06 RX ADMIN — Medication 3 MG: at 20:38

## 2025-07-06 RX ADMIN — SODIUM CHLORIDE 40 MG: 9 INJECTION INTRAMUSCULAR; INTRAVENOUS; SUBCUTANEOUS at 17:49

## 2025-07-06 RX ADMIN — HEPARIN SODIUM 6000 UNITS: 1000 INJECTION INTRAVENOUS; SUBCUTANEOUS at 20:41

## 2025-07-06 RX ADMIN — HEPARIN SODIUM 12 UNITS/KG/HR: 10000 INJECTION, SOLUTION INTRAVENOUS at 16:12

## 2025-07-06 RX ADMIN — MORPHINE SULFATE 4 MG: 4 INJECTION INTRAVENOUS at 11:59

## 2025-07-06 RX ADMIN — Medication 100 MG: at 16:27

## 2025-07-06 RX ADMIN — ASPIRIN 81 MG: 81 TABLET, CHEWABLE ORAL at 16:27

## 2025-07-06 RX ADMIN — IOPAMIDOL 100 ML: 755 INJECTION, SOLUTION INTRAVENOUS at 12:41

## 2025-07-06 RX ADMIN — BUDESONIDE INHALATION 500 MCG: 0.5 SUSPENSION RESPIRATORY (INHALATION) at 21:09

## 2025-07-06 RX ADMIN — SODIUM CHLORIDE 500 ML: 0.9 INJECTION, SOLUTION INTRAVENOUS at 13:12

## 2025-07-06 RX ADMIN — CARVEDILOL 6.25 MG: 6.25 TABLET, FILM COATED ORAL at 16:04

## 2025-07-06 RX ADMIN — ATORVASTATIN CALCIUM 80 MG: 80 TABLET, FILM COATED ORAL at 20:38

## 2025-07-06 ASSESSMENT — PAIN SCALES - GENERAL
PAINLEVEL_OUTOF10: 8
PAINLEVEL_OUTOF10: 9
PAINLEVEL_OUTOF10: 3

## 2025-07-06 ASSESSMENT — PAIN DESCRIPTION - DESCRIPTORS
DESCRIPTORS: HEAVINESS;PRESSURE
DESCRIPTORS: CRUSHING;PRESSURE
DESCRIPTORS: PRESSURE;CRUSHING

## 2025-07-06 ASSESSMENT — PAIN DESCRIPTION - LOCATION
LOCATION: CHEST

## 2025-07-06 ASSESSMENT — PAIN DESCRIPTION - ORIENTATION
ORIENTATION: MID
ORIENTATION: ANTERIOR
ORIENTATION: MID
ORIENTATION: MID

## 2025-07-06 ASSESSMENT — LIFESTYLE VARIABLES
HOW MANY STANDARD DRINKS CONTAINING ALCOHOL DO YOU HAVE ON A TYPICAL DAY: 1 OR 2
HOW OFTEN DO YOU HAVE A DRINK CONTAINING ALCOHOL: 2-3 TIMES A WEEK

## 2025-07-06 ASSESSMENT — PAIN - FUNCTIONAL ASSESSMENT: PAIN_FUNCTIONAL_ASSESSMENT: 0-10

## 2025-07-06 NOTE — H&P
Immature Granulocytes Absolute 0.05 0.0 - 0.5 K/UL   Comprehensive Metabolic Panel w/ Reflex to MG    Collection Time: 07/06/25 11:15 AM   Result Value Ref Range    Sodium 139 136 - 145 mmol/L    Potassium 3.4 (L) 3.5 - 5.1 mmol/L    Chloride 101 98 - 107 mmol/L    CO2 22 20 - 29 mmol/L    Anion Gap 16 7 - 16 mmol/L    Glucose 96 70 - 99 mg/dL    BUN 7 6 - 23 MG/DL    Creatinine 1.01 0.80 - 1.30 MG/DL    Est, Glom Filt Rate >90 >60 ml/min/1.73m2    Calcium 9.3 8.8 - 10.2 MG/DL    Total Bilirubin 3.4 (H) 0.0 - 1.2 MG/DL    ALT 24 8 - 55 U/L    AST 39 (H) 15 - 37 U/L    Alk Phosphatase 100 40 - 129 U/L    Total Protein 7.0 6.3 - 8.2 g/dL    Albumin 3.6 3.5 - 5.0 g/dL    Globulin 3.5 2.3 - 3.5 g/dL    Albumin/Globulin Ratio 1.0 1.0 - 1.9     Brain Natriuretic Peptide    Collection Time: 07/06/25 11:15 AM   Result Value Ref Range    NT Pro-BNP 3,390 (H) 0 - 125 PG/ML   Troponin    Collection Time: 07/06/25 11:15 AM   Result Value Ref Range    Troponin T 308.0 (HH) 0 - 22 ng/L   Magnesium    Collection Time: 07/06/25 11:15 AM   Result Value Ref Range    Magnesium 1.6 (L) 1.8 - 2.4 mg/dL   Troponin now then q90 min for 2 occurances    Collection Time: 07/06/25 12:54 PM   Result Value Ref Range    Troponin T 285.0 (HH) 0 - 22 ng/L       No results for input(s): \"COVID19\" in the last 72 hours.    CT CHEST PULMONARY EMBOLISM W CONTRAST  Result Date: 7/6/2025  EXAMINATION: CT CHEST PULMONARY EMBOLISM W CONTRAST 7/6/2025 12:47 PM ACCESSION NUMBER: [id number] COMPARISON: Chest AP dated 7/6/2025 INDICATION: Hypoxemia, history of pulmonary embolism.  Hemoptysis.  Concern for worsening clot burden/pulmonary infarct TECHNIQUE: Multiple contiguous 2D axial CT images of the chest were obtained from the lung apices to the lung bases after the intravenous administration of 100mL Iso-jimmy 370 per pulmonary angiography protocol.  Coronal reconstructions were performed. Radiation dose reduction techniques were used for this study. Our

## 2025-07-06 NOTE — ED TRIAGE NOTES
Patient arrived with a complaint of chest pain and SOB started again after being discharged from the hospital - June 25th, 2025. Patient have had recurrent symptoms. Eliquis since discharged.         Diagnosed with blood clots, CAD    Smoker- half a pack a day.

## 2025-07-06 NOTE — ED PROVIDER NOTES
Dimension 4.6 cm    RV Free Wall Peak S' 6.6 cm/s    TR Max Velocity 4.24 m/s    TR Peak Gradient 72 mmHg    TR Peak Gradient 72 mmHg    Body Surface Area 1.98 m2    Fractional Shortening 2D 32 28 - 44 %    LV ESV Index A4C 8 mL/m2    LV EDV Index A4C 17 mL/m2    LV ESV Index A2C 14 mL/m2    LV EDV Index A2C 31 mL/m2    LVIDd Index 2.20 cm/m2    LVIDs Index 1.50 cm/m2    LV RWT Ratio 0.45     LV Mass 2D 147.8 88 - 224 g    LV Mass 2D Index 73.9 49 - 115 g/m2    MV E/A 0.51     E/E' Ratio (Averaged) 6.01     E/E' Lateral 3.48     E/E' Septal 8.53     LVOT Stroke Volume Index 20.4 mL/m2    LA Size Index 1.55 cm/m2    LA/AO Root Ratio 0.91     Ao Root Index 1.70 cm/m2    AV Velocity Ratio 0.80     LVOT:AV VTI Index 0.91     CLARENCE/BSA VTI 1.6 cm2/m2    CLARENCE/BSA Peak Velocity 1.3 cm2/m2    MV:LVOT VTI Index 1.15     EF Physician 55 %    Narrative      Left Ventricle: Normal left ventricular systolic function with a   visually estimated EF of 55 - 60%. Left ventricle size is normal. Normal   wall thickness. Normal wall motion.    Right Ventricle: Right ventricle is severely dilated. Hypertrophy   noted. Normal systolic function.    Tricuspid Valve: Mild regurgitation. Incomplete evaluation for RVSP,   however, echo findings suggest significantly elevated RVSPs.    Image quality is adequate.           CT CHEST PULMONARY EMBOLISM W CONTRAST   Final Result   1. Evidence of bilateral pulmonary embolism shows no significant change since   the prior study.   2. Main pulmonary artery is dilated with mild right heart strain.   3. No acute lung lesion.            Electronically signed by Billy Dhillon      XR CHEST (2 VW)   Final Result   Clear lungs.         Electronically signed by Billy Dhillon                   No results for input(s): \"COVID19\" in the last 72 hours.     Voice dictation software was used during the making of this note.  This software is not perfect and grammatical and other typographical errors may be present.

## 2025-07-06 NOTE — ED NOTES
TRANSFER - OUT REPORT:    Verbal report given to Yarelis LAMBERT on José Antonio Kyle  being transferred to [facility] for routine progression of patient care       Report consisted of patient's Situation, Background, Assessment and   Recommendations(SBAR).     Information from the following report(s) ED Encounter Summary was reviewed with the receiving nurse.    [place] Fall Assessment:    Presents to emergency department  because of falls (Syncope, seizure, or loss of consciousness): No  Age > 70: No  Altered Mental Status, Intoxication with alcohol or substance confusion (Disorientation, impaired judgment, poor safety awaremess, or inability to follow instructions): No  Impaired Mobility: Ambulates or transfers with assistive devices or assistance; Unable to ambulate or transer.: No  Nursing Judgement: No          Lines:   Peripheral IV 07/06/25 Right Antecubital (Active)   Site Assessment Clean, dry & intact 07/06/25 1113   Line Status Blood return noted;Flushed 07/06/25 1113   Phlebitis Assessment No symptoms 07/06/25 1113   Infiltration Assessment 0 07/06/25 1113   Alcohol Cap Used No 07/06/25 1113   Dressing Status New dressing applied 07/06/25 1113   Dressing Type Transparent 07/06/25 1113   Dressing Intervention New 07/06/25 1113       Peripheral IV 07/06/25 Distal;Left Antecubital (Active)        Opportunity for questions and clarification was provided.      Patient transported with:  Registered Nurse          Fidel Smith RN  07/06/25 3311

## 2025-07-07 ENCOUNTER — APPOINTMENT (OUTPATIENT)
Dept: INTERVENTIONAL RADIOLOGY/VASCULAR | Age: 45
DRG: 175 | End: 2025-07-07
Attending: STUDENT IN AN ORGANIZED HEALTH CARE EDUCATION/TRAINING PROGRAM
Payer: COMMERCIAL

## 2025-07-07 ENCOUNTER — APPOINTMENT (OUTPATIENT)
Dept: ULTRASOUND IMAGING | Age: 45
DRG: 175 | End: 2025-07-07
Payer: COMMERCIAL

## 2025-07-07 PROBLEM — R79.89 ELEVATED TROPONIN: Status: ACTIVE | Noted: 2025-07-07

## 2025-07-07 PROBLEM — Z90.81 S/P SPLENECTOMY: Status: ACTIVE | Noted: 2025-07-07

## 2025-07-07 PROBLEM — R09.02 HYPOXEMIA: Status: ACTIVE | Noted: 2025-07-07

## 2025-07-07 LAB
ANION GAP SERPL CALC-SCNC: 10 MMOL/L (ref 7–16)
APTT PPP: 61.9 SEC (ref 23.3–37.4)
APTT PPP: 92 SEC (ref 23.3–37.4)
BASOPHILS # BLD: 0.15 K/UL (ref 0–0.2)
BASOPHILS NFR BLD: 1.3 % (ref 0–2)
BUN SERPL-MCNC: 8 MG/DL (ref 6–23)
CALCIUM SERPL-MCNC: 8.4 MG/DL (ref 8.8–10.2)
CHLORIDE SERPL-SCNC: 104 MMOL/L (ref 98–107)
CO2 SERPL-SCNC: 19 MMOL/L (ref 20–29)
CREAT SERPL-MCNC: 0.88 MG/DL (ref 0.8–1.3)
DIFFERENTIAL METHOD BLD: ABNORMAL
EOSINOPHIL # BLD: 0.32 K/UL (ref 0–0.8)
EOSINOPHIL NFR BLD: 2.8 % (ref 0.5–7.8)
ERYTHROCYTE [DISTWIDTH] IN BLOOD BY AUTOMATED COUNT: 16.9 % (ref 11.9–14.6)
ERYTHROCYTE [DISTWIDTH] IN BLOOD BY AUTOMATED COUNT: 17.1 % (ref 11.9–14.6)
GLUCOSE BLD STRIP.AUTO-MCNC: 72 MG/DL (ref 65–100)
GLUCOSE SERPL-MCNC: 134 MG/DL (ref 70–99)
HCT VFR BLD AUTO: 43.2 % (ref 41.1–50.3)
HCT VFR BLD AUTO: 47.1 % (ref 41.1–50.3)
HGB BLD-MCNC: 15 G/DL (ref 13.6–17.2)
HGB BLD-MCNC: 16.5 G/DL (ref 13.6–17.2)
IMM GRANULOCYTES # BLD AUTO: 0.05 K/UL (ref 0–0.5)
IMM GRANULOCYTES NFR BLD AUTO: 0.4 % (ref 0–5)
LYMPHOCYTES # BLD: 3.25 K/UL (ref 0.5–4.6)
LYMPHOCYTES NFR BLD: 28.7 % (ref 13–44)
MCH RBC QN AUTO: 39.3 PG (ref 26.1–32.9)
MCH RBC QN AUTO: 39.7 PG (ref 26.1–32.9)
MCHC RBC AUTO-ENTMCNC: 34.7 G/DL (ref 31.4–35)
MCHC RBC AUTO-ENTMCNC: 35 G/DL (ref 31.4–35)
MCV RBC AUTO: 113.1 FL (ref 82–102)
MCV RBC AUTO: 113.2 FL (ref 82–102)
MONOCYTES # BLD: 0.84 K/UL (ref 0.1–1.3)
MONOCYTES NFR BLD: 7.4 % (ref 4–12)
NEUTS SEG # BLD: 6.73 K/UL (ref 1.7–8.2)
NEUTS SEG NFR BLD: 59.4 % (ref 43–78)
NRBC # BLD: 0.07 K/UL (ref 0–0.2)
NRBC # BLD: 0.09 K/UL (ref 0–0.2)
PLATELET # BLD AUTO: 153 K/UL (ref 150–450)
PLATELET # BLD AUTO: 172 K/UL (ref 150–450)
PMV BLD AUTO: 9.5 FL (ref 9.4–12.3)
PMV BLD AUTO: 9.8 FL (ref 9.4–12.3)
POTASSIUM SERPL-SCNC: 4.3 MMOL/L (ref 3.5–5.1)
RBC # BLD AUTO: 3.82 M/UL (ref 4.23–5.6)
RBC # BLD AUTO: 4.16 M/UL (ref 4.23–5.6)
SERVICE CMNT-IMP: NORMAL
SODIUM SERPL-SCNC: 133 MMOL/L (ref 136–145)
WBC # BLD AUTO: 10.1 K/UL (ref 4.3–11.1)
WBC # BLD AUTO: 11.3 K/UL (ref 4.3–11.1)

## 2025-07-07 PROCEDURE — 6360000002 HC RX W HCPCS: Performed by: STUDENT IN AN ORGANIZED HEALTH CARE EDUCATION/TRAINING PROGRAM

## 2025-07-07 PROCEDURE — 6370000000 HC RX 637 (ALT 250 FOR IP): Performed by: HOSPITALIST

## 2025-07-07 PROCEDURE — 97161 PT EVAL LOW COMPLEX 20 MIN: CPT

## 2025-07-07 PROCEDURE — 2580000003 HC RX 258: Performed by: HOSPITALIST

## 2025-07-07 PROCEDURE — 36415 COLL VENOUS BLD VENIPUNCTURE: CPT

## 2025-07-07 PROCEDURE — 2500000003 HC RX 250 WO HCPCS: Performed by: RADIOLOGY

## 2025-07-07 PROCEDURE — B31T1ZZ FLUOROSCOPY OF LEFT PULMONARY ARTERY USING LOW OSMOLAR CONTRAST: ICD-10-PCS | Performed by: RADIOLOGY

## 2025-07-07 PROCEDURE — 85025 COMPLETE CBC W/AUTO DIFF WBC: CPT

## 2025-07-07 PROCEDURE — 93970 EXTREMITY STUDY: CPT | Performed by: RADIOLOGY

## 2025-07-07 PROCEDURE — 82962 GLUCOSE BLOOD TEST: CPT

## 2025-07-07 PROCEDURE — 2500000003 HC RX 250 WO HCPCS: Performed by: STUDENT IN AN ORGANIZED HEALTH CARE EDUCATION/TRAINING PROGRAM

## 2025-07-07 PROCEDURE — 2580000003 HC RX 258: Performed by: STUDENT IN AN ORGANIZED HEALTH CARE EDUCATION/TRAINING PROGRAM

## 2025-07-07 PROCEDURE — 80048 BASIC METABOLIC PNL TOTAL CA: CPT

## 2025-07-07 PROCEDURE — 99223 1ST HOSP IP/OBS HIGH 75: CPT | Performed by: INTERNAL MEDICINE

## 2025-07-07 PROCEDURE — 6360000002 HC RX W HCPCS: Performed by: HOSPITALIST

## 2025-07-07 PROCEDURE — 97535 SELF CARE MNGMENT TRAINING: CPT

## 2025-07-07 PROCEDURE — B31SZZZ FLUOROSCOPY OF RIGHT PULMONARY ARTERY: ICD-10-PCS | Performed by: RADIOLOGY

## 2025-07-07 PROCEDURE — 2700000000 HC OXYGEN THERAPY PER DAY

## 2025-07-07 PROCEDURE — 94640 AIRWAY INHALATION TREATMENT: CPT

## 2025-07-07 PROCEDURE — 97165 OT EVAL LOW COMPLEX 30 MIN: CPT

## 2025-07-07 PROCEDURE — 85027 COMPLETE CBC AUTOMATED: CPT

## 2025-07-07 PROCEDURE — 36592 COLLECT BLOOD FROM PICC: CPT

## 2025-07-07 PROCEDURE — 36015 PLACE CATHETER IN ARTERY: CPT

## 2025-07-07 PROCEDURE — 3E06317 INTRODUCTION OF OTHER THROMBOLYTIC INTO CENTRAL ARTERY, PERCUTANEOUS APPROACH: ICD-10-PCS | Performed by: RADIOLOGY

## 2025-07-07 PROCEDURE — 97530 THERAPEUTIC ACTIVITIES: CPT

## 2025-07-07 PROCEDURE — 2500000003 HC RX 250 WO HCPCS: Performed by: HOSPITALIST

## 2025-07-07 PROCEDURE — 6360000002 HC RX W HCPCS: Performed by: RADIOLOGY

## 2025-07-07 PROCEDURE — 6360000004 HC RX CONTRAST MEDICATION: Performed by: RADIOLOGY

## 2025-07-07 PROCEDURE — 2100000001 HC CVICU R&B

## 2025-07-07 PROCEDURE — 93970 EXTREMITY STUDY: CPT

## 2025-07-07 PROCEDURE — 94761 N-INVAS EAR/PLS OXIMETRY MLT: CPT

## 2025-07-07 PROCEDURE — 99232 SBSQ HOSP IP/OBS MODERATE 35: CPT | Performed by: INTERNAL MEDICINE

## 2025-07-07 PROCEDURE — 5A0945A ASSISTANCE WITH RESPIRATORY VENTILATION, 24-96 CONSECUTIVE HOURS, HIGH NASAL FLOW/VELOCITY: ICD-10-PCS | Performed by: RADIOLOGY

## 2025-07-07 PROCEDURE — 85730 THROMBOPLASTIN TIME PARTIAL: CPT

## 2025-07-07 PROCEDURE — 2580000003 HC RX 258: Performed by: NURSE PRACTITIONER

## 2025-07-07 PROCEDURE — 2580000003 HC RX 258: Performed by: RADIOLOGY

## 2025-07-07 RX ORDER — FENTANYL CITRATE 50 UG/ML
INJECTION, SOLUTION INTRAMUSCULAR; INTRAVENOUS PRN
Status: COMPLETED | OUTPATIENT
Start: 2025-07-07 | End: 2025-07-07

## 2025-07-07 RX ORDER — HEPARIN SODIUM 10000 [USP'U]/100ML
500 INJECTION, SOLUTION INTRAVENOUS CONTINUOUS
Status: DISCONTINUED | OUTPATIENT
Start: 2025-07-07 | End: 2025-07-08

## 2025-07-07 RX ORDER — MIDAZOLAM HYDROCHLORIDE 1 MG/ML
INJECTION, SOLUTION INTRAMUSCULAR; INTRAVENOUS PRN
Status: COMPLETED | OUTPATIENT
Start: 2025-07-07 | End: 2025-07-07

## 2025-07-07 RX ORDER — LIDOCAINE HYDROCHLORIDE 10 MG/ML
INJECTION, SOLUTION EPIDURAL; INFILTRATION; INTRACAUDAL; PERINEURAL PRN
Status: COMPLETED | OUTPATIENT
Start: 2025-07-07 | End: 2025-07-07

## 2025-07-07 RX ORDER — SODIUM CHLORIDE, SODIUM LACTATE, POTASSIUM CHLORIDE, AND CALCIUM CHLORIDE .6; .31; .03; .02 G/100ML; G/100ML; G/100ML; G/100ML
250 INJECTION, SOLUTION INTRAVENOUS ONCE
Status: COMPLETED | OUTPATIENT
Start: 2025-07-07 | End: 2025-07-07

## 2025-07-07 RX ADMIN — SODIUM CHLORIDE, PRESERVATIVE FREE 10 ML: 5 INJECTION INTRAVENOUS at 08:45

## 2025-07-07 RX ADMIN — LIDOCAINE HYDROCHLORIDE 10 ML: 10 INJECTION, SOLUTION EPIDURAL; INFILTRATION; INTRACAUDAL; PERINEURAL at 16:17

## 2025-07-07 RX ADMIN — MORPHINE SULFATE 2 MG: 2 INJECTION, SOLUTION INTRAMUSCULAR; INTRAVENOUS at 02:41

## 2025-07-07 RX ADMIN — FENTANYL CITRATE 50 MCG: 50 INJECTION, SOLUTION INTRAMUSCULAR; INTRAVENOUS at 16:16

## 2025-07-07 RX ADMIN — BUDESONIDE INHALATION 500 MCG: 0.5 SUSPENSION RESPIRATORY (INHALATION) at 08:37

## 2025-07-07 RX ADMIN — ATORVASTATIN CALCIUM 80 MG: 80 TABLET, FILM COATED ORAL at 20:18

## 2025-07-07 RX ADMIN — FENTANYL CITRATE 50 MCG: 50 INJECTION, SOLUTION INTRAMUSCULAR; INTRAVENOUS at 16:10

## 2025-07-07 RX ADMIN — FOLIC ACID 1 MG: 1 TABLET ORAL at 08:37

## 2025-07-07 RX ADMIN — HEPARIN SODIUM 20 UNITS/KG/HR: 10000 INJECTION, SOLUTION INTRAVENOUS at 12:17

## 2025-07-07 RX ADMIN — MORPHINE SULFATE 2 MG: 2 INJECTION, SOLUTION INTRAMUSCULAR; INTRAVENOUS at 23:18

## 2025-07-07 RX ADMIN — ALTEPLASE 2 MG: 2.2 INJECTION, POWDER, LYOPHILIZED, FOR SOLUTION INTRAVENOUS at 16:32

## 2025-07-07 RX ADMIN — ASPIRIN 81 MG: 81 TABLET, CHEWABLE ORAL at 08:37

## 2025-07-07 RX ADMIN — SODIUM CHLORIDE, SODIUM LACTATE, POTASSIUM CHLORIDE, AND CALCIUM CHLORIDE 250 ML: .6; .31; .03; .02 INJECTION, SOLUTION INTRAVENOUS at 02:44

## 2025-07-07 RX ADMIN — HEPARIN SODIUM 500 UNITS/HR: 10000 INJECTION, SOLUTION INTRAVENOUS at 17:14

## 2025-07-07 RX ADMIN — SODIUM CHLORIDE: 0.9 INJECTION, SOLUTION INTRAVENOUS at 17:11

## 2025-07-07 RX ADMIN — MIDAZOLAM 0.5 MG: 1 INJECTION INTRAMUSCULAR; INTRAVENOUS at 16:40

## 2025-07-07 RX ADMIN — MIDAZOLAM 1 MG: 1 INJECTION INTRAMUSCULAR; INTRAVENOUS at 16:10

## 2025-07-07 RX ADMIN — Medication 0.5 MG/HR: at 17:11

## 2025-07-07 RX ADMIN — CARVEDILOL 6.25 MG: 6.25 TABLET, FILM COATED ORAL at 20:12

## 2025-07-07 RX ADMIN — ALTEPLASE 2 MG: 2.2 INJECTION, POWDER, LYOPHILIZED, FOR SOLUTION INTRAVENOUS at 16:43

## 2025-07-07 RX ADMIN — HEPARIN SODIUM 3000 UNITS: 1000 INJECTION INTRAVENOUS; SUBCUTANEOUS at 09:55

## 2025-07-07 RX ADMIN — FENTANYL CITRATE 50 MCG: 50 INJECTION, SOLUTION INTRAMUSCULAR; INTRAVENOUS at 16:30

## 2025-07-07 RX ADMIN — ARFORMOTEROL TARTRATE 15 MCG: 15 SOLUTION RESPIRATORY (INHALATION) at 08:36

## 2025-07-07 RX ADMIN — MIDAZOLAM 1 MG: 1 INJECTION INTRAMUSCULAR; INTRAVENOUS at 16:30

## 2025-07-07 RX ADMIN — FENTANYL CITRATE 25 MCG: 50 INJECTION, SOLUTION INTRAMUSCULAR; INTRAVENOUS at 16:40

## 2025-07-07 RX ADMIN — SODIUM CHLORIDE, PRESERVATIVE FREE 10 ML: 5 INJECTION INTRAVENOUS at 20:41

## 2025-07-07 RX ADMIN — MORPHINE SULFATE 2 MG: 2 INJECTION, SOLUTION INTRAMUSCULAR; INTRAVENOUS at 06:34

## 2025-07-07 RX ADMIN — SODIUM CHLORIDE 40 MG: 9 INJECTION INTRAMUSCULAR; INTRAVENOUS; SUBCUTANEOUS at 08:38

## 2025-07-07 RX ADMIN — Medication 100 MG: at 08:37

## 2025-07-07 RX ADMIN — MIDAZOLAM 1 MG: 1 INJECTION INTRAMUSCULAR; INTRAVENOUS at 16:16

## 2025-07-07 RX ADMIN — MORPHINE SULFATE 2 MG: 2 INJECTION, SOLUTION INTRAMUSCULAR; INTRAVENOUS at 20:13

## 2025-07-07 RX ADMIN — Medication 3 MG: at 20:19

## 2025-07-07 RX ADMIN — IOPAMIDOL 30 ML: 612 INJECTION, SOLUTION INTRAVENOUS at 16:50

## 2025-07-07 RX ADMIN — Medication 0.5 MG/HR: at 17:10

## 2025-07-07 ASSESSMENT — PAIN DESCRIPTION - LOCATION
LOCATION: CHEST
LOCATION: NECK
LOCATION: CHEST
LOCATION: NECK

## 2025-07-07 ASSESSMENT — PAIN DESCRIPTION - PAIN TYPE: TYPE: ACUTE PAIN

## 2025-07-07 ASSESSMENT — PAIN DESCRIPTION - DESCRIPTORS
DESCRIPTORS: ACHING
DESCRIPTORS: CRUSHING;STABBING
DESCRIPTORS: STABBING
DESCRIPTORS: ACHING

## 2025-07-07 ASSESSMENT — PAIN SCALES - GENERAL
PAINLEVEL_OUTOF10: 2
PAINLEVEL_OUTOF10: 5
PAINLEVEL_OUTOF10: 8
PAINLEVEL_OUTOF10: 0
PAINLEVEL_OUTOF10: 8
PAINLEVEL_OUTOF10: 9
PAINLEVEL_OUTOF10: 4
PAINLEVEL_OUTOF10: 2
PAINLEVEL_OUTOF10: 0
PAINLEVEL_OUTOF10: 8

## 2025-07-07 ASSESSMENT — PAIN - FUNCTIONAL ASSESSMENT: PAIN_FUNCTIONAL_ASSESSMENT: NONE - DENIES PAIN

## 2025-07-07 ASSESSMENT — PAIN DESCRIPTION - ORIENTATION
ORIENTATION: MID;LEFT
ORIENTATION: RIGHT
ORIENTATION: RIGHT
ORIENTATION: MID;LEFT

## 2025-07-07 NOTE — ICUWATCH
RRT Clinical Rounding Nurse Update    Vitals:    07/07/25 0330 07/07/25 0430 07/07/25 0500 07/07/25 0634   BP: 93/69 97/77 92/71    Pulse: 67 62 55    Resp:    18   Temp:  97.6 °F (36.4 °C)     TempSrc:  Axillary     SpO2: 97% 99% 100%    Weight:  76.5 kg (168 lb 10.4 oz)     Height:            DETERIORATION INDEX SCORE: 35    ASSESSMENT:  Previous outreach assessment was reviewed. IR consult placed today for possiblePE intervention. CT chest was read as unchanged from prior study on 6/24. Pt now requiring AIRVO 40/60. Pt remains on heparin gtt / wbc improved today / trop remains up at 300s. Cardiology following RV strain. Pt with no acute needs this morning. % on airvo. RR even unlabored. Coarse MERCEDES    PLAN:  Will follow per RRT Clinical Rounding Program protocol.    Deon Marmolejo RN  [facility]: [phone]  [facility]: [phone]

## 2025-07-07 NOTE — BRIEF OP NOTE
Brief Postoperative Note      Patient: José Antonio Kyle  YOB: 1980  MRN: [id number]    Date of Procedure: 7/7/2025    Pre-Op Diagnosis: Acute on chronic PE    Post-Op Diagnosis: Same       : Gamaliel    Bilateral pulmonary angiography with placement of infusion catheters into the bilateral lower lobe pulmonary arteries. Significantly elevated pulmonary artery pressure of 63 mmhg, out of proportion to his clot burden, likely related to acute on chronic PE (CTEPH). Right IJ access.    Infuse 0.5 mg/hr per catheter for a total of 1mg/hr TPA. Heparin at continuous rate of 500 units per hour through the side arm of the sheath. Anticipate catheter and sheath removal at bedside tomorrow.     Anesthesia: Moderate sedation    Estimated Blood Loss (mL): less than 50     Complications: None    Specimens:   * No specimens in log *    Implants:  * No implants in log *      Drains: * No LDAs found *    Findings:  As above    Electronically signed by Yogesh Alston MD on 7/7/2025 at 4:51 PM

## 2025-07-07 NOTE — OR NURSING
TRANSFER - OUT REPORT:     Verbal report given to Mehrdad  on José Antonio Kyle  being transferred to [facility]ICU for routine progression of patient care      Report consisted of patient’s Situation, Background, Assessment and Recommendations(SBAR).        Information from the following report(s) SBAR, Procedure Summary, and MAR was reviewed with the receiving nurse.     Opportunity for questions and clarification was provided.      Conscious Sedation:    175 Mcg of Fentanyl administered   3.5 Mg of Versed administered           Pt tolerated procedure well.      Peripheral Intravenous Line:   Peripheral IV 07/06/25 Right Antecubital (Active)   Site Assessment Clean, dry & intact 07/07/25 1735   Line Status Capped;Flushed 07/07/25 1735   Line Care Connections checked and tightened 07/07/25 1735   Phlebitis Assessment No symptoms 07/07/25 1735   Infiltration Assessment 0 07/07/25 1735   Alcohol Cap Used Yes 07/07/25 1735   Dressing Status Clean, dry & intact 07/07/25 1735   Dressing Type Transparent 07/07/25 1735   Dressing Intervention New 07/06/25 1800       Peripheral IV 07/06/25 Distal;Left Antecubital (Active)   Site Assessment Clean, dry & intact 07/07/25 1735   Line Status Capped;Flushed 07/07/25 1735   Line Care Connections checked and tightened 07/07/25 1735   Phlebitis Assessment No symptoms 07/07/25 1735   Infiltration Assessment 0 07/07/25 1735   Alcohol Cap Used Yes 07/07/25 1735   Dressing Status Clean, dry & intact 07/07/25 1735   Dressing Type Transparent 07/07/25 1735       Right neck  gauze and transparent dressing   clean, dry, intact, and nontender    VITALS:  /79   Pulse 80   Temp 97.7 °F (36.5 °C) (Axillary)   Resp 16   Ht 1.88 m (6' 2\")   Wt 76.2 kg (168 lb)   SpO2 100%   BMI 21.57 kg/m²

## 2025-07-07 NOTE — PRE SEDATION
Sedation Pre-Procedure Note    Patient Name: José Antonio Kyle   YOB: 1980  Room/Bed: [facility]  Medical Record Number: [id number]  Date: 7/7/2025   Time: 3:55 PM       Indication:  Bilateral PE    Consent: I have discussed with the patient and/or the patient representative the indication, alternatives, and the possible risks and/or complications of the planned procedure and the anesthesia methods. The patient and/or patient representative appear to understand and agree to proceed.    Vital Signs:   Vitals:    07/07/25 1517   BP: 122/76   Pulse: 61   Resp: 22   Temp: 98 °F (36.7 °C)   SpO2: 99%       Past Medical History:   has a past medical history of Anxiety, GERD (gastroesophageal reflux disease), Hypertension, NSTEMI (non-ST elevated myocardial infarction) (HCC), Other ill-defined conditions(799.89), PUD (peptic ulcer disease), Spherocytosis, Spherocytosis, and URI (upper respiratory infection).    Past Surgical History:   has a past surgical history that includes gi; Cardiac catheterization; hernia repair; Splenectomy; Tonsillectomy; Splenectomy (1987); Cardiac catheterization (2014); Appendectomy; and Wrist fracture surgery (Right, 03/2017).    Medications:   Scheduled Meds:    aspirin  81 mg Oral Daily    atorvastatin  80 mg Oral Nightly    carvedilol  6.25 mg Oral BID WC    folic acid  1 mg Oral Daily    thiamine  100 mg Oral Daily    sodium chloride flush  5-40 mL IntraVENous 2 times per day    melatonin  3 mg Oral Nightly    sennosides-docusate sodium  1 tablet Oral BID    nicotine  1 patch TransDERmal Daily     Continuous Infusions:    ALTEplase (CATHFLO) 10 mg in sodium chloride 0.9 % 250 mL infusion      heparin (porcine)      sodium chloride      heparin (PORCINE) Infusion 20 Units/kg/hr (07/07/25 1217)     PRN Meds: sodium chloride flush, sodium chloride, potassium chloride **OR** potassium alternative oral replacement **OR** potassium chloride, magnesium sulfate, polyethylene glycol,

## 2025-07-07 NOTE — SIGNIFICANT EVENT
Rapid response called in view of pt becoming more hypoxic, hypotensive, with increased work of breathing and worsening chest pain.  Initial BP 64/51, placed on 10 lt HFNC.    Case discussed with cardiology and ICU attending on call as well.    Pt initially started on heparin gtt per ACS protocol without the initial bolus.    EKG negative for any acute ischemic changes. No urgent need for LHC at this time.    High concerns of recurrent PE although CT chest PE protocol didn't show any new PE burden, but pt's compliance with eliquis is questionable.    Decision made for pt to be switched to PE protocol heparin gtt with heparin bolus.    Breath sounds clear on auscultation.  Pt's BP is trending up, will monitor closely.  Will discuss case with IR to evaluate pt's CT chest.

## 2025-07-07 NOTE — ICUWATCH
Rapid Response Team Note      Subjective: Responded to Rapid Response.    Summary: RRT called for hypotension, increased WOB, O2 desat, and chest pain. On arrival pt on NRB, dusky appearance. Pt with chronic PE on Eliquis. EKG negative, Pt states feeling better and better able to breath with O2 and chest pain slightly resolved. BP normalized. Hospitalist orders received to adjust heparin gtt dosage to PE protocol, give bolus, and for labs. Pt to remain on floor at this time. Breath sounds clear, respirations improved. PT placed on 6L HFNC with SpO2 94%.      See Rapid Response/Code Blue Narrator for full documentation    Outcome: Patient to remain in current location. Will follow-up per Rapid Response Team Clinical Rounding protocol.      José Antonio Johnson RN  [facility]: [phone]  [facility]: [phone]

## 2025-07-07 NOTE — CARE COORDINATION
Pt chart reviewed. PT has recommendations for home health. MSW unable to discuss due to pt being off floor for ultrasound. Will attempt at later time. No other needs expressed at this time for case management. Discharge to be determined. MSW will follow patient for care.

## 2025-07-07 NOTE — ICUWATCH
RRT Clinical Rounding Nurse Update    Vitals:    07/07/25 0000 07/07/25 0034 07/07/25 0115 07/07/25 0215   BP:  91/72 92/66 (!) 89/71   Pulse: 67 76 65 68   Resp: 18 18     Temp:  97.3 °F (36.3 °C)     TempSrc:  Oral     SpO2: 99% 95% 98% 97%   Weight:       Height:            ASSESSMENT:  Previous outreach assessment was reviewed. Pt resting in bed on Airvo 50L/85%, c/o 8 of 10 CP, BP marginal. Primary RN reaching out to MD. Pt alert and oriented, denies SOB, respirations unlabored.    PLAN:  Will follow per RRT Clinical Rounding Program protocol.    José Antonio Johnson RN  [facility]: [phone]  [facility]: [phone]

## 2025-07-07 NOTE — THERAPY EVALUATION
overall CGA-SBA for functional transfers and mobility within room and bathroom. Pt also completed lower body dressing with SBA. Pt fatigued very easily throughout session, requiring several seated rest breaks. SpO2 noted to drop into 80s with activity but recover >93% with rest. Pt left seated in bedside chair with all needs met. Today pt presents with decreased activity tolerance, functional mobility, strength, and balance impacting ADLs. Pt is currently functioning below baseline and would benefit from skilled OT services to address OT goals and plan of care. Anticipate no OT at d/c.   .     [facility] AM-PAC™ “6 Clicks” Daily Activity Inpatient Short Form:    AM-PAC Daily Activity - Inpatient   How much help is needed for putting on and taking off regular lower body clothing?: A Little  How much help is needed for bathing (which includes washing, rinsing, drying)?: A Little  How much help is needed for toileting (which includes using toilet, bedpan, or urinal)?: None  How much help is needed for putting on and taking off regular upper body clothing?: None  How much help is needed for taking care of personal grooming?: None  How much help for eating meals?: None  [facility] Daily [facility] Raw Score: 22  AM-PAC Inpatient ADL T-Scale Score : 47.1  ADL Inpatient CMS 0-100% Score: 25.8  ADL Inpatient CMS G-Code Modifier : CJ           SUBJECTIVE:     Mr. Kyle states, \"I just want to get back to work\"     Social/Functional Lives With: Parent  Type of Home: House  Home Layout: One level  Home Access: Level entry  Has the patient had two or more falls in the past year or any fall with injury in the past year?: No  Prior Level of Assist for ADLs: Independent  Prior Level of Assist for Homemaking: Independent  Prior Level of Assist for Ambulation: Independent community ambulator, with or without device  Prior Level of Assist for Transfers: Independent  Active : Yes  Type of Occupation: works on

## 2025-07-08 DIAGNOSIS — I26.99 RECURRENT PULMONARY EMBOLISM (HCC): Primary | ICD-10-CM

## 2025-07-08 PROBLEM — R06.09 DYSPNEA ON EXERTION: Status: ACTIVE | Noted: 2025-07-08

## 2025-07-08 PROBLEM — J96.01 ACUTE HYPOXIC RESPIRATORY FAILURE (HCC): Status: ACTIVE | Noted: 2025-07-08

## 2025-07-08 PROBLEM — Z51.81 ANTICOAGULATION MANAGEMENT ENCOUNTER: Status: ACTIVE | Noted: 2025-07-08

## 2025-07-08 PROBLEM — Z79.01 ANTICOAGULATION MANAGEMENT ENCOUNTER: Status: ACTIVE | Noted: 2025-07-08

## 2025-07-08 LAB
ANION GAP SERPL CALC-SCNC: 8 MMOL/L (ref 7–16)
APTT PPP: 105.9 SEC (ref 23.3–37.4)
APTT PPP: 46.2 SEC (ref 23.3–37.4)
BUN SERPL-MCNC: 10 MG/DL (ref 6–23)
CALCIUM SERPL-MCNC: 8.4 MG/DL (ref 8.8–10.2)
CHLORIDE SERPL-SCNC: 104 MMOL/L (ref 98–107)
CO2 SERPL-SCNC: 23 MMOL/L (ref 20–29)
CREAT SERPL-MCNC: 0.93 MG/DL (ref 0.8–1.3)
ERYTHROCYTE [DISTWIDTH] IN BLOOD BY AUTOMATED COUNT: 16.8 % (ref 11.9–14.6)
FIBRINOGEN PPP-MCNC: 261 MG/DL (ref 190–501)
GLUCOSE SERPL-MCNC: 111 MG/DL (ref 70–99)
HCT VFR BLD AUTO: 42.5 % (ref 41.1–50.3)
HGB BLD-MCNC: 14.5 G/DL (ref 13.6–17.2)
MCH RBC QN AUTO: 39 PG (ref 26.1–32.9)
MCHC RBC AUTO-ENTMCNC: 34.1 G/DL (ref 31.4–35)
MCV RBC AUTO: 114.2 FL (ref 82–102)
NRBC # BLD: 0.12 K/UL (ref 0–0.2)
PLATELET # BLD AUTO: 173 K/UL (ref 150–450)
PMV BLD AUTO: 10.3 FL (ref 9.4–12.3)
POTASSIUM SERPL-SCNC: 4.1 MMOL/L (ref 3.5–5.1)
RBC # BLD AUTO: 3.72 M/UL (ref 4.23–5.6)
SODIUM SERPL-SCNC: 135 MMOL/L (ref 136–145)
TROPONIN T SERPL HS-MCNC: 248 NG/L (ref 0–22)
WBC # BLD AUTO: 11.5 K/UL (ref 4.3–11.1)

## 2025-07-08 PROCEDURE — 85384 FIBRINOGEN ACTIVITY: CPT

## 2025-07-08 PROCEDURE — 2500000003 HC RX 250 WO HCPCS: Performed by: HOSPITALIST

## 2025-07-08 PROCEDURE — 2580000003 HC RX 258: Performed by: RADIOLOGY

## 2025-07-08 PROCEDURE — 94761 N-INVAS EAR/PLS OXIMETRY MLT: CPT

## 2025-07-08 PROCEDURE — 6370000000 HC RX 637 (ALT 250 FOR IP): Performed by: HOSPITALIST

## 2025-07-08 PROCEDURE — 99232 SBSQ HOSP IP/OBS MODERATE 35: CPT | Performed by: INTERNAL MEDICINE

## 2025-07-08 PROCEDURE — 99233 SBSQ HOSP IP/OBS HIGH 50: CPT | Performed by: INTERNAL MEDICINE

## 2025-07-08 PROCEDURE — 2580000003 HC RX 258: Performed by: STUDENT IN AN ORGANIZED HEALTH CARE EDUCATION/TRAINING PROGRAM

## 2025-07-08 PROCEDURE — 85730 THROMBOPLASTIN TIME PARTIAL: CPT

## 2025-07-08 PROCEDURE — 6370000000 HC RX 637 (ALT 250 FOR IP): Performed by: INTERNAL MEDICINE

## 2025-07-08 PROCEDURE — 2700000000 HC OXYGEN THERAPY PER DAY

## 2025-07-08 PROCEDURE — 2500000003 HC RX 250 WO HCPCS: Performed by: STUDENT IN AN ORGANIZED HEALTH CARE EDUCATION/TRAINING PROGRAM

## 2025-07-08 PROCEDURE — 36415 COLL VENOUS BLD VENIPUNCTURE: CPT

## 2025-07-08 PROCEDURE — 85027 COMPLETE CBC AUTOMATED: CPT

## 2025-07-08 PROCEDURE — 99254 IP/OBS CNSLTJ NEW/EST MOD 60: CPT | Performed by: INTERNAL MEDICINE

## 2025-07-08 PROCEDURE — 84484 ASSAY OF TROPONIN QUANT: CPT

## 2025-07-08 PROCEDURE — 2500000003 HC RX 250 WO HCPCS: Performed by: RADIOLOGY

## 2025-07-08 PROCEDURE — 94760 N-INVAS EAR/PLS OXIMETRY 1: CPT

## 2025-07-08 PROCEDURE — 1100000000 HC RM PRIVATE

## 2025-07-08 PROCEDURE — 6360000002 HC RX W HCPCS: Performed by: STUDENT IN AN ORGANIZED HEALTH CARE EDUCATION/TRAINING PROGRAM

## 2025-07-08 PROCEDURE — 6360000002 HC RX W HCPCS: Performed by: RADIOLOGY

## 2025-07-08 PROCEDURE — 80048 BASIC METABOLIC PNL TOTAL CA: CPT

## 2025-07-08 RX ORDER — IOPAMIDOL 612 MG/ML
INJECTION, SOLUTION INTRAVASCULAR PRN
Status: COMPLETED | OUTPATIENT
Start: 2025-07-07 | End: 2025-07-07

## 2025-07-08 RX ORDER — OXYCODONE AND ACETAMINOPHEN 5; 325 MG/1; MG/1
1 TABLET ORAL EVERY 4 HOURS PRN
Refills: 0 | Status: DISCONTINUED | OUTPATIENT
Start: 2025-07-08 | End: 2025-07-09 | Stop reason: HOSPADM

## 2025-07-08 RX ADMIN — Medication 3 MG: at 21:41

## 2025-07-08 RX ADMIN — MORPHINE SULFATE 2 MG: 2 INJECTION, SOLUTION INTRAMUSCULAR; INTRAVENOUS at 02:34

## 2025-07-08 RX ADMIN — MORPHINE SULFATE 2 MG: 2 INJECTION, SOLUTION INTRAMUSCULAR; INTRAVENOUS at 23:59

## 2025-07-08 RX ADMIN — OXYCODONE AND ACETAMINOPHEN 1 TABLET: 5; 325 TABLET ORAL at 16:06

## 2025-07-08 RX ADMIN — ATORVASTATIN CALCIUM 80 MG: 80 TABLET, FILM COATED ORAL at 21:41

## 2025-07-08 RX ADMIN — Medication 0.5 MG/HR: at 11:59

## 2025-07-08 RX ADMIN — MORPHINE SULFATE 2 MG: 2 INJECTION, SOLUTION INTRAMUSCULAR; INTRAVENOUS at 05:37

## 2025-07-08 RX ADMIN — MORPHINE SULFATE 2 MG: 2 INJECTION, SOLUTION INTRAMUSCULAR; INTRAVENOUS at 19:54

## 2025-07-08 RX ADMIN — SODIUM CHLORIDE, PRESERVATIVE FREE 10 ML: 5 INJECTION INTRAVENOUS at 21:41

## 2025-07-08 RX ADMIN — DOCUSATE SODIUM 50 MG AND SENNOSIDES 8.6 MG 1 TABLET: 8.6; 5 TABLET, FILM COATED ORAL at 08:42

## 2025-07-08 RX ADMIN — ASPIRIN 81 MG: 81 TABLET, CHEWABLE ORAL at 08:42

## 2025-07-08 RX ADMIN — HEPARIN SODIUM 6000 UNITS: 1000 INJECTION INTRAVENOUS; SUBCUTANEOUS at 21:46

## 2025-07-08 RX ADMIN — OXYCODONE AND ACETAMINOPHEN 1 TABLET: 5; 325 TABLET ORAL at 09:52

## 2025-07-08 RX ADMIN — Medication 0.5 MG/HR: at 11:58

## 2025-07-08 RX ADMIN — SODIUM CHLORIDE, PRESERVATIVE FREE 10 ML: 5 INJECTION INTRAVENOUS at 08:46

## 2025-07-08 RX ADMIN — FOLIC ACID 1 MG: 1 TABLET ORAL at 08:43

## 2025-07-08 RX ADMIN — Medication 100 MG: at 08:42

## 2025-07-08 RX ADMIN — ACETAMINOPHEN 650 MG: 325 TABLET ORAL at 04:00

## 2025-07-08 ASSESSMENT — PAIN SCALES - GENERAL
PAINLEVEL_OUTOF10: 2
PAINLEVEL_OUTOF10: 6
PAINLEVEL_OUTOF10: 9
PAINLEVEL_OUTOF10: 4
PAINLEVEL_OUTOF10: 10
PAINLEVEL_OUTOF10: 8
PAINLEVEL_OUTOF10: 8
PAINLEVEL_OUTOF10: 7
PAINLEVEL_OUTOF10: 6

## 2025-07-08 ASSESSMENT — PAIN DESCRIPTION - LOCATION
LOCATION: NECK
LOCATION: CHEST
LOCATION: NECK
LOCATION: NECK
LOCATION: CHEST
LOCATION: NECK

## 2025-07-08 ASSESSMENT — PAIN DESCRIPTION - DESCRIPTORS
DESCRIPTORS: THROBBING
DESCRIPTORS: HEAVINESS
DESCRIPTORS: DISCOMFORT
DESCRIPTORS: THROBBING
DESCRIPTORS: THROBBING;ACHING
DESCRIPTORS: HEAVINESS

## 2025-07-08 ASSESSMENT — PAIN DESCRIPTION - ORIENTATION
ORIENTATION: RIGHT
ORIENTATION: RIGHT
ORIENTATION: LEFT
ORIENTATION: LEFT
ORIENTATION: RIGHT

## 2025-07-08 NOTE — CARE COORDINATION
ARUNA CM completed chart review and met with pt at bedside for initial CM and readmission assessment. Pt recently admitted 6/24 - 6/26 with chest pain, AMS, bilateral PE, new onset seizure, and chest pain and discharged home with family support.     Pt readmitted with SOB, unstable angina, dyspnea on exertion, acute chest pain, chest pain, and chronic pulmonary embolism. Pt alert and oriented x 4. Demographics, insurance, and PCP discussed and verified. PCP Dr. Skyler Sage, last visit 1/28/25. Pt lives with mother in one level home with level entrance. Pt reports assisting mother when needed. Pt independent with ADLs, full-time  of car business, and an active  in the community PTA. No DME utilized at baseline. No history of HH/STR. Pt with local supportive family. No falls reported. Family plans to provide transportation at discharge.     Pt states legally  and has not legally separation at this time. Pt requested spiritual consult to complete HCPOA to make ex-spouse Purnima Kyle decision maker. CM placed spiritual consult for HCPOA documents to be completed.     PT/OT to eval when medically stable.     Pt currently on 5L/Min 02 NC and does not utilize oxygen at baseline.    CM to follow oxygen stats for potential home oxygen need.    DC/POC to return home with family support when medically stable.    CM team to continue to follow for any needs that may arise.     LIZETTE Loo, \[facility] [place] [facility]\""   [facility]     07/08/25 0778   Readmission Assessment   Number of Days since last admission? 8-30 days   Previous Disposition Home with Family   Who is being Interviewed Patient   What was the patient's/caregiver's perception as to why they think they needed to return back to the hospital? Other (Comment)  (chest pain and SOB)   Did you visit your Primary Care Physician after you left the hospital, before you returned this time? No   Why weren't you able to visit your PCP? Did not have an

## 2025-07-08 NOTE — CONSULTS
Patient has a history of recurrent DVT and multiple PEs.  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  3 days ago he developed worsening shortness of breath and chest pain and was readmitted due to PE.  He has had multiple chest CT scans since this past January showing bilateral PE but did not initially have any right heart strain.      CT scan of the chest yesterday does reveal mild right heart strain.  Dr. Alston personally reviewed imaging and appreciates subsegmental clot bilaterally worse in the lower lobes.  Patient has an elevated troponin.  ECHO reveals right ventricle severely dilated.  Patient is currently on 20 L high flow nasal cannula oxygen but is not tachycardic and is anticoagulated on heparin.  He has been on Eliquis and denies missing any doses.    It was explained to patient that given the location of his clot he would not be a candidate for mechanical vein thrombectomy but we do recommend tPA thrombolysis later today.  He is to remain NPO.  He also has some right lower extremity swelling and discomfort and duplex venous ultrasound will be obtained today as well.    Dr. 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  Recommend pulmonology consult for this      Plan:  -Patient to undergo tPA thrombolysis later today    -Pulmonology consult for CTEPH    -Duplex US of lower extremities will be obtained today  
NIFEdipine (PROCARDIA XL) 30 mg, Oral, DAILY    pantoprazole (PROTONIX) 40 mg, Oral, 2 TIMES DAILY BEFORE MEALS      Past Medical History:   Diagnosis Date    Anxiety     GERD (gastroesophageal reflux disease)     pt treats with tums- resolves per pt- sleeps 1 pillow    Hypertension     managed with meds    NSTEMI (non-ST elevated myocardial infarction) (HCC) 8/14/2014    no stents- no angioplasty-- treatment with medication only    Other ill-defined conditions(799.89)     spirocytosis, high cholesterol    PUD (peptic ulcer disease) 2014    resolved per pt/ treated    Spherocytosis     Spherocytosis     URI (upper respiratory infection) 8/14/2014     Past Surgical History:   Procedure Laterality Date    APPENDECTOMY      CARDIAC CATHETERIZATION      CARDIAC CATHETERIZATION  2014    no intervention    GI      spleen removed    HERNIA REPAIR      SPLENECTOMY      SPLENECTOMY  1987    and cholecystectomy     TONSILLECTOMY      WRIST FRACTURE SURGERY Right 03/2017    titanium levi/screws     Social History     Socioeconomic History    Marital status:      Spouse name: Not on file    Number of children: Not on file    Years of education: Not on file    Highest education level: Not on file   Occupational History    Not on file   Tobacco Use    Smoking status: Every Day     Current packs/day: 2.00     Types: Cigarettes    Smokeless tobacco: Former   Substance and Sexual Activity    Alcohol use: Yes     Alcohol/week: 0.0 standard drinks of alcohol    Drug use: Yes     Types: Prescription    Sexual activity: Not on file   Other Topics Concern    Not on file   Social History Narrative    Not on file     Social Drivers of Health     Financial Resource Strain: Low Risk  (7/12/2022)    Received from [email or web address], [facility]    Financial Resource Strain     Difficulty Paying Living Expenses: Not hard at all     Difficulty Paying Medical Expenses: No   Food Insecurity: No Food Insecurity (6/26/2025)    Hunger Vital Sign 
Oral BID    promethazine (PHENERGAN) tablet 25 mg  25 mg Oral Q6H PRN    Or    ondansetron (ZOFRAN) injection 4 mg  4 mg IntraVENous Q4H PRN    nicotine (NICODERM CQ) 21 MG/24HR 1 patch  1 patch TransDERmal Daily    potassium chloride (KLOR-CON M) extended release tablet 40 mEq  40 mEq Oral every 2 hours    magnesium sulfate 4000 mg in 100 mL IVPB premix  4,000 mg IntraVENous Once    ipratropium 0.5 mg-albuterol 2.5 mg (DUONEB) nebulizer solution 1 Dose  1 Dose Inhalation Q4H PRN    nitroGLYCERIN (NITROSTAT) SL tablet 0.4 mg  0.4 mg SubLINGual PRN    morphine (PF) injection 2 mg  2 mg IntraVENous Q4H PRN     Current Outpatient Medications   Medication Sig    NIFEdipine (PROCARDIA XL) 30 MG extended release tablet Take 1 tablet by mouth daily    carvedilol (COREG) 3.125 MG tablet Take 1 tablet by mouth with breakfast and with evening meal    folic acid (FOLVITE) 1 MG tablet Take 1 tablet by mouth daily    aspirin 81 MG chewable tablet Take 4 tablets by mouth daily    pantoprazole (PROTONIX) 40 MG tablet Take 1 tablet by mouth 2 times daily (before meals)    apixaban (ELIQUIS) 5 MG TABS tablet Take 2 tablets by mouth 2 times daily for 6 days, THEN 1 tablet 2 times daily for 24 days.    atorvastatin (LIPITOR) 80 MG tablet Take 1 tablet by mouth nightly       Review of Symptoms:  Review of Systems   All other systems reviewed and are negative.             Subjective:     Physical Exam:    Vitals:    07/06/25 1200 07/06/25 1230 07/06/25 1315 07/06/25 1402   BP: (!) 140/105 (!) 118/98 (!) 121/95 (!) 126/96   Pulse: (!) 109 (!) 109 100 97   Resp: (!) 32 20 16 21   Temp:       SpO2: 92% (!) 89% 93% 92%   Weight:       Height:           Physical Exam  Vitals reviewed.   Constitutional:       General: He is not in acute distress.  Eyes:      Pupils: Pupils are equal, round, and reactive to light.   Cardiovascular:      Rate and Rhythm: Normal rate and regular rhythm.      Pulses: Normal pulses.      Heart sounds: Normal heart 
-- 60 10 98 %   25 0730 -- -- -- 63 14 100 %   25 0715 -- 97.5 °F (36.4 °C) Oral 66 13 98 %   25 0700 104/72 -- -- 61 16 98 %   25 0630 -- -- -- 64 13 99 %   25 0600 116/73 -- -- 70 19 97 %   25 0537 -- -- -- -- 19 --   25 0530 -- -- -- 66 12 95 %   25 0500 (!) 85/65 -- -- 64 16 96 %   25 0430 -- -- -- 67 16 93 %   25 0400 (!) 89/ -- -- 67 14 95 %   25 0300 92/66 97.4 °F (36.3 °C) Oral 80 14 93 %   25 0234 -- -- -- -- 16 --   25 0200 (!) 85/55 -- -- 67 17 93 %     Temp (24hrs), Av.6 °F (36.4 °C), Min:97.4 °F (36.3 °C), Max:98 °F (36.7 °C)    No intake/output data recorded.    Physical Exam:  Constitutional: Acutely ill appearing male in no acute distress, sitting comfortably in the hospital bed.    HEENT: Normocephalic and atraumatic. Oropharynx is clear, mucous membranes are moist.  Extraocular muscles are intact.  Sclerae anicteric. Neck supple without JVD. No thyromegaly present.    Skin Warm and dry.  No bruising and no rash noted.  No erythema.  No pallor.    Neuro Grossly nonfocal with no obvious sensory or motor deficits.   MSK Normal range of motion in general.     Psych Appropriate mood and affect.    Full exam per attending MD    Labs:    Recent Results (from the past 24 hours)   CBC    Collection Time: 25  5:54 PM   Result Value Ref Range    WBC 10.1 4.3 - 11.1 K/uL    RBC 3.82 (L) 4.23 - 5.6 M/uL    Hemoglobin 15.0 13.6 - 17.2 g/dL    Hematocrit 43.2 41.1 - 50.3 %    .1 (H) 82 - 102 FL    MCH 39.3 (H) 26.1 - 32.9 PG    MCHC 34.7 31.4 - 35.0 g/dL    RDW 16.9 (H) 11.9 - 14.6 %    Platelets 172 150 - 450 K/uL    MPV 9.8 9.4 - 12.3 FL    nRBC 0.09 0.0 - 0.2 K/uL   POCT Glucose    Collection Time: 25  6:05 PM   Result Value Ref Range    POC Glucose 72 65 - 100 mg/dL    Performed by: Nelson    Troponin    Collection Time: 25  4:11 AM   Result Value Ref Range    Troponin T 248.0 () 0 - 22

## 2025-07-08 NOTE — PLAN OF CARE
Problem: Discharge Planning  Goal: Discharge to home or other facility with appropriate resources  Recent Flowsheet Documentation  Taken 7/7/2025 1901 by Wanda Omalley RN  Discharge to home or other facility with appropriate resources:   Identify barriers to discharge with patient and caregiver   Arrange for needed discharge resources and transportation as appropriate   Identify discharge learning needs (meds, wound care, etc)   Refer to discharge planning if patient needs post-hospital services based on physician order or complex needs related to functional status, cognitive ability or social support system     Problem: Pain  Goal: Verbalizes/displays adequate comfort level or baseline comfort level  Recent Flowsheet Documentation  Taken 7/7/2025 1901 by Wanda Omalley RN  Verbalizes/displays adequate comfort level or baseline comfort level:   Encourage patient to monitor pain and request assistance   Assess pain using appropriate pain scale   Administer analgesics based on type and severity of pain and evaluate response     Problem: Safety - Adult  Goal: Free from fall injury  7/7/2025 2156 by Wanda Omalley RN  Outcome: Progressing     Problem: Respiratory - Adult  Goal: Achieves optimal ventilation and oxygenation  7/7/2025 2156 by Wanda Omalley RN  Outcome: Progressing  Flowsheets (Taken 7/7/2025 1901)  Achieves optimal ventilation and oxygenation:   Assess for changes in respiratory status   Assess for changes in mentation and behavior   Position to facilitate oxygenation and minimize respiratory effort   Oxygen supplementation based on oxygen saturation or arterial blood gases   Respiratory therapy support as indicated     Problem: Skin/Tissue Integrity  Goal: Skin integrity remains intact  Description: 1.  Monitor for areas of redness and/or skin breakdown  2.  Assess vascular access sites hourly  3.  Every 4-6 hours minimum:  Change oxygen saturation probe site  4.  Every 4-6 hours:  If

## 2025-07-08 NOTE — INTERDISCIPLINARY ROUNDS
Multi-D Rounds/Checklist (le[place]rog):  Lines: can any be removed?: per IR     Venous Sheath 07/07/25 Right (Active)     DVT Prophylaxis: Ordered  Vent: N/A  Nutrition Ordered/appropriate: Per Primary Team  Can antibiotics or other drugs be stopped? N/A Yes/No  MRSA swab:   Inpat Anti-Infectives (From admission, onward)      None          Consults needed: None  A: Is pain control adequate? (has PRNs? Stop drip?) Yes  B: Sedation break and SBT? N/A  C: Is sedation choice appropriate? N/A  D: Delirium/CAM-ICU? No  E: Mobility goals/appropriateness? Yes  F: Family update and plan? parent is surrogate decision maker and is being updated daily by primary attending and nursing staff.    Sherie Irizarry, APRN - CNP

## 2025-07-09 ENCOUNTER — CLINICAL DOCUMENTATION (OUTPATIENT)
Facility: HOSPITAL | Age: 45
End: 2025-07-09

## 2025-07-09 VITALS
HEART RATE: 72 BPM | OXYGEN SATURATION: 95 % | RESPIRATION RATE: 18 BRPM | DIASTOLIC BLOOD PRESSURE: 90 MMHG | WEIGHT: 168 LBS | TEMPERATURE: 98.2 F | HEIGHT: 74 IN | BODY MASS INDEX: 21.56 KG/M2 | SYSTOLIC BLOOD PRESSURE: 120 MMHG

## 2025-07-09 LAB
ANION GAP SERPL CALC-SCNC: 10 MMOL/L (ref 7–16)
APTT PPP: 102.6 SEC (ref 23.3–37.4)
APTT PPP: 79.3 SEC (ref 23.3–37.4)
BUN SERPL-MCNC: 11 MG/DL (ref 6–23)
CALCIUM SERPL-MCNC: 8.4 MG/DL (ref 8.8–10.2)
CHLORIDE SERPL-SCNC: 105 MMOL/L (ref 98–107)
CO2 SERPL-SCNC: 22 MMOL/L (ref 20–29)
CREAT SERPL-MCNC: 0.85 MG/DL (ref 0.8–1.3)
GLUCOSE SERPL-MCNC: 96 MG/DL (ref 70–99)
POTASSIUM SERPL-SCNC: 3.8 MMOL/L (ref 3.5–5.1)
SODIUM SERPL-SCNC: 137 MMOL/L (ref 136–145)

## 2025-07-09 PROCEDURE — 2500000003 HC RX 250 WO HCPCS: Performed by: HOSPITALIST

## 2025-07-09 PROCEDURE — 6360000002 HC RX W HCPCS: Performed by: STUDENT IN AN ORGANIZED HEALTH CARE EDUCATION/TRAINING PROGRAM

## 2025-07-09 PROCEDURE — 6360000002 HC RX W HCPCS: Performed by: RADIOLOGY

## 2025-07-09 PROCEDURE — 6370000000 HC RX 637 (ALT 250 FOR IP): Performed by: HOSPITALIST

## 2025-07-09 PROCEDURE — 85730 THROMBOPLASTIN TIME PARTIAL: CPT

## 2025-07-09 PROCEDURE — 36415 COLL VENOUS BLD VENIPUNCTURE: CPT

## 2025-07-09 PROCEDURE — 80048 BASIC METABOLIC PNL TOTAL CA: CPT

## 2025-07-09 RX ORDER — ENOXAPARIN SODIUM 100 MG/ML
1 INJECTION SUBCUTANEOUS 2 TIMES DAILY
Qty: 48 ML | Refills: 0 | Status: SHIPPED | OUTPATIENT
Start: 2025-07-09 | End: 2025-08-08

## 2025-07-09 RX ORDER — OXYCODONE AND ACETAMINOPHEN 5; 325 MG/1; MG/1
1 TABLET ORAL EVERY 4 HOURS PRN
Qty: 8 TABLET | Refills: 0 | Status: SHIPPED | OUTPATIENT
Start: 2025-07-09 | End: 2025-07-11

## 2025-07-09 RX ORDER — ENOXAPARIN SODIUM 100 MG/ML
1 INJECTION SUBCUTANEOUS 2 TIMES DAILY
Status: DISCONTINUED | OUTPATIENT
Start: 2025-07-09 | End: 2025-07-09 | Stop reason: HOSPADM

## 2025-07-09 RX ORDER — LANOLIN ALCOHOL/MO/W.PET/CERES
100 CREAM (GRAM) TOPICAL DAILY
Qty: 30 TABLET | Refills: 0 | Status: SHIPPED | OUTPATIENT
Start: 2025-07-10 | End: 2025-08-09

## 2025-07-09 RX ADMIN — ENOXAPARIN SODIUM 80 MG: 100 INJECTION SUBCUTANEOUS at 12:46

## 2025-07-09 RX ADMIN — SODIUM CHLORIDE, PRESERVATIVE FREE 10 ML: 5 INJECTION INTRAVENOUS at 08:41

## 2025-07-09 RX ADMIN — DOCUSATE SODIUM 50 MG AND SENNOSIDES 8.6 MG 1 TABLET: 8.6; 5 TABLET, FILM COATED ORAL at 08:35

## 2025-07-09 RX ADMIN — Medication 100 MG: at 08:35

## 2025-07-09 RX ADMIN — MORPHINE SULFATE 2 MG: 2 INJECTION, SOLUTION INTRAMUSCULAR; INTRAVENOUS at 04:11

## 2025-07-09 RX ADMIN — HEPARIN SODIUM 22 UNITS/KG/HR: 10000 INJECTION, SOLUTION INTRAVENOUS at 07:05

## 2025-07-09 RX ADMIN — MORPHINE SULFATE 2 MG: 2 INJECTION, SOLUTION INTRAMUSCULAR; INTRAVENOUS at 08:33

## 2025-07-09 RX ADMIN — ASPIRIN 81 MG: 81 TABLET, CHEWABLE ORAL at 08:35

## 2025-07-09 RX ADMIN — FOLIC ACID 1 MG: 1 TABLET ORAL at 08:35

## 2025-07-09 ASSESSMENT — PAIN DESCRIPTION - LOCATION
LOCATION: CHEST
LOCATION: CHEST

## 2025-07-09 ASSESSMENT — PAIN SCALES - WONG BAKER
WONGBAKER_NUMERICALRESPONSE: NO HURT
WONGBAKER_NUMERICALRESPONSE: NO HURT

## 2025-07-09 ASSESSMENT — PAIN SCALES - GENERAL
PAINLEVEL_OUTOF10: 7
PAINLEVEL_OUTOF10: 10
PAINLEVEL_OUTOF10: 8

## 2025-07-09 ASSESSMENT — PAIN DESCRIPTION - DESCRIPTORS
DESCRIPTORS: SHARP;STABBING
DESCRIPTORS: HEAVINESS

## 2025-07-09 ASSESSMENT — PAIN DESCRIPTION - ORIENTATION
ORIENTATION: LEFT
ORIENTATION: LEFT

## 2025-07-09 NOTE — CARE COORDINATION
07/09/25 1358   Services At/After Discharge   Transition of Care Consult (CM Consult) N/A   Services At/After Discharge None   [place] Resource Information Provided? No   Mode of Transport at Discharge Self   Confirm Follow Up Transport Family   Condition of Participation: Discharge Planning   The Plan for Transition of Care is related to the following treatment goals: Patient to return home with family/friend support when medically stable.   The Patient and/or Patient Representative was provided with a Choice of Provider? Patient   The Patient and/Or Patient Representative agree with the Discharge Plan? Yes   Freedom of Choice list was provided with basic dialogue that supports the patient's individualized plan of care/goals, treatment preferences, and shares the quality data associated with the providers?  Yes     Patient is discharging home with family support. Patient will drive himself home today.

## 2025-07-09 NOTE — PLAN OF CARE
Problem: Discharge Planning  Goal: Discharge to home or other facility with appropriate resources  7/9/2025 1116 by Tiffany Maynard RN  Outcome: Progressing  7/8/2025 2356 by Camila Sams RN  Outcome: Progressing  Flowsheets (Taken 7/8/2025 1945)  Discharge to home or other facility with appropriate resources:   Identify barriers to discharge with patient and caregiver   Arrange for needed discharge resources and transportation as appropriate   Identify discharge learning needs (meds, wound care, etc)     Problem: Pain  Goal: Verbalizes/displays adequate comfort level or baseline comfort level  7/9/2025 1116 by Tiffany Maynard RN  Outcome: Progressing  Flowsheets (Taken 7/9/2025 0752)  Verbalizes/displays adequate comfort level or baseline comfort level: Assess pain using appropriate pain scale  7/8/2025 2356 by Camila Sams RN  Outcome: Progressing  Flowsheets (Taken 7/8/2025 1954)  Verbalizes/displays adequate comfort level or baseline comfort level: Assess pain using appropriate pain scale     Problem: Safety - Adult  Goal: Free from fall injury  7/9/2025 1116 by Tiffany Maynard RN  Outcome: Progressing  7/8/2025 2356 by Camila Sams RN  Outcome: Progressing  Flowsheets (Taken 7/8/2025 1945)  Free From Fall Injury: Instruct family/caregiver on patient safety

## 2025-07-09 NOTE — DISCHARGE INSTRUCTIONS
DISCHARGE SUMMARY from Nurse    PATIENT INSTRUCTIONS:    What to do at Home:  Recommended activity: activity as tolerated    If you experience a return of any symptoms, please follow up with primary care provider.    *  Please give a list of your current medications to your Primary Care Provider.    *  Please update this list whenever your medications are discontinued, doses are      changed, or new medications (including over-the-counter products) are added.    *  Please carry medication information at all times in case of emergency situations.    These are general instructions for a healthy lifestyle:    No smoking/ No tobacco products/ Avoid exposure to second hand smoke  Surgeon General's Warning:  Quitting smoking now greatly reduces serious risk to your health.    Obesity, smoking, and sedentary lifestyle greatly increases your risk for illness    A healthy diet, regular physical exercise & weight monitoring are important for maintaining a healthy lifestyle    You may be retaining fluid if you have a history of heart failure or if you experience any of the following symptoms:  Weight gain of 3 pounds or more overnight or 5 pounds in a week, increased swelling in our hands or feet or shortness of breath while lying flat in bed.  Please call your doctor as soon as you notice any of these symptoms; do not wait until your next office visit.        The discharge information has been reviewed with the patient.

## 2025-07-09 NOTE — DISCHARGE SUMMARY
function.    Tricuspid Valve: Mild regurgitation. Incomplete evaluation for RVSP, however, echo findings suggest significantly elevated RVSPs.    Image quality is adequate.    Signed by: Deon Silva MD on 7/6/2025  4:03 PM      Diagnostic Imaging/Tests:   IR TRANSCATH INFUSION THROMB NONCORONARY  Result Date: 7/8/2025  Angiography of the right and left pulmonary arteries with initiation of catheter based pharmacologic thrombolysis. Severely elevated pulmonary artery pressure of 63 mmHg, consistent with acute on chronic PE (CTEPH). _______________________________________________________________ PROCEDURE SUMMARY: - Venous access with ultrasound guidance - Bilateral main pulmonary angiography - Initiation of pharmacologic thrombolysis with infusion catheters in the pulmonary arteries PROCEDURE DETAILS: Pre-procedure Consent: Informed consent for the procedure including risks, benefits and alternatives was obtained and time-out was performed prior to the procedure. Preparation: The site was prepared and draped using maximal sterile barrier technique including cutaneous antisepsis. Anesthesia/sedation Level of anesthesia/sedation: Moderate sedation (conscious sedation) Anesthesia/sedation administered by: Independent trained observer under attending supervision with continuous monitoring of the patient?s level of consciousness and physiologic status Total intra-service sedation time (minutes): 40 Access Local anesthesia was administered. The vessel was sonographically evaluated and determined to be patent. Real time ultrasound was used to visualize needle entry into the vessel and a permanent image was stored. A 12 [place] sheath was placed. Vein accessed: Right internal jugular vein Access technique: Micropuncture set with 21 gauge needle Pulmonary angiography and interventions The pulmonary arterial system was catheterized using an angled 6 [place] pigtail catheter and 035 guidewire. Indication for angiography:

## 2025-07-09 NOTE — PROGRESS NOTES
[facility] PROGRESS NOTE           7/8/2025 8:38 AM    Admit Date: 7/6/2025      Subjective:   Patient with mild left-sided pleuritic chest pain.  Currently receiving tPA at 0.5 mg/h.  Plans for evaluation by radiology later today.    ROS:  Cardiovascular:  As noted above    Objective:      Vitals:    07/08/25 0630 07/08/25 0700 07/08/25 0715 07/08/25 0808   BP:  104/72     Pulse: 64 61 66    Resp: 13 16 13    Temp:   97.5 °F (36.4 °C)    TempSrc:   Oral    SpO2: 99% 98% 98% 96%   Weight:       Height:           Physical Exam:  General-No Acute Distress  Neck- supple, no JVD  CV- regular rate and rhythm no MRG  Lung- clear bilaterally  Abd- soft, nontender, nondistended  Ext- no edema bilaterally.  Skin- warm and dry      Data Review:   Recent Labs     07/08/25  0411 07/07/25  1754 07/07/25  0201   WBC 11.5* 10.1 11.3*   HGB 14.5 15.0 16.5   HCT 42.5 43.2 47.1   .2* 113.1* 113.2*    172 153       Recent Labs     07/08/25  0411 07/07/25  0201 07/06/25  1115   *   < > 139   K 4.1   < > 3.4*      < > 101   CO2 23   < > 22   BUN 10   < > 7   CREATININE 0.93   < > 1.01   GLUCOSE 111*   < > 96   CALCIUM 8.4*   < > 9.3   BILITOT  --   --  3.4*   ALKPHOS  --   --  100   AST  --   --  39*   ALT  --   --  24   LABGLOM >90   < > >90   GLOB  --   --  3.5    < > = values in this interval not displayed.       Recent Labs     06/24/25 2012   DDIMER 0.30              Assessment/Plan:     Active Hospital Problems    Hypoxemia  Wean oxygen as tolerated.        Elevated troponin  Due to RV strain.  No indication for ischemia evaluation.  Continue treatment for underlying pulmonary embolism.      S/P splenectomy  Noted.  Per primary team      *Bilateral pulmonary embolism (HCC)  Continue intravenous heparin and tPA.  Would consider hematology consult for assistance with ongoing anticoagulation given recurrent events on Eliquis and Xarelto.      ETOH abuse  Noted.      Hereditary 
               José Antonio Kyle  Admission Date: 7/6/2025         Daily Progress Note: 7/9/2025    The patient's chart is reviewed and the patient is discussed with the staff.    Background: Patient is a 46yo male seen and evaluated for CTEPH. PMH includes recurrent DVTs/bilateral PEs while on Xarelto and Eliquis with reported compliance, mild CAD, tobacco abuse, alcohol abuse, possible absence seizure's (on Keppra), hereditary spherocytosis, and splenectomy as a child, and history of noncompliance.  He has had multiple CT scans showing bilateral PE and recent discharge for recurrent PEs on 6/25/2025.      He presented here after reported complaints of chest pain and SOB on eliquis.  CT showed B PTE but now with echocardiogram showing severely dilated right ventricle and significantly elevated RVSP (unable to full evaluate); previous echocardiogram in 2020 was without PHTN.. He was hypoxic and IR was consulted for mechanical thrombectomy but he is not a candidate. IR proceeded with tPA thrombolysis on 7/7. LE ultrasound ordered. We were asked to him for chronic thromboembolic pulmonary hypertension. He has seen Hem/onc in the past       He reports compliance, clots have dated back to when he was 34 years old. He smokes 2 ppd, drinks alcohol (3-4 double shots of fireball every other day) and work restoring cars. He was on xarelto until earlier this ear when he was changed to eliquis.    Subjective:     Having GARCIA, occasional cough. Currently on room air.    Current Facility-Administered Medications   Medication Dose Route Frequency    oxyCODONE-acetaminophen (PERCOCET) 5-325 MG per tablet 1 tablet  1 tablet Oral Q4H PRN    aspirin chewable tablet 81 mg  81 mg Oral Daily    atorvastatin (LIPITOR) tablet 80 mg  80 mg Oral Nightly    [Held by provider] carvedilol (COREG) tablet 6.25 mg  6.25 mg Oral BID WC    folic acid (FOLVITE) tablet 1 mg  1 mg Oral Daily    thiamine tablet 100 mg  100 mg Oral Daily    sodium 
   07/09/25 1158   Resting (Room Air)   SpO2 95   HR 94   During Walk (Room Air)   SpO2 94   HR 94   After Walk   SpO2 95   HR 94   Does the Patient Qualify for Home O2 No   Does the Patient Need Portable Oxygen Tanks No       
4 Eyes Skin Assessment     NAME:  José Antonio Kyle  YOB: 1980  MEDICAL RECORD NUMBER:  [id number]    The patient is being assessed for  Cath Lab Post-Op    I agree that at least one RN has performed a thorough Head to Toe Skin Assessment on the patient. ALL assessment sites listed below have been assessed.      Areas assessed by both nurses:    Head, Face, Ears, Shoulders, Back, Chest, Arms, Elbows, Hands, Sacrum. Buttock, Coccyx, Ischium, and Legs. Feet and Heels        Does the Patient have a Wound? No noted wound(s)       Rick Prevention initiated by RN: Yes  Wound Care Orders initiated by RN: Yes    Pressure Injury (Stage 1,2,3,4, Unstageable, DTI, NWPT, and Complex wounds) if present, place Wound referral order by RN under : No    New Ostomies, if present place, Ostomy referral order under : No     Nurse 1 eSignature: Electronically signed by LUCERO CHAMORRO RN on 7/7/25 at 6:26 PM EDT    **SHARE this note so that the co-signing nurse can place an eSignature**    Nurse 2 eSignature: Electronically signed by Mehrdad Zee RN on 7/7/25 at 6:27 PM EDT   
ACUTE PHYSICAL THERAPY GOALS:   (Developed with and agreed upon by patient and/or caregiver.)  Pt will ambulate 500 ft independently with use of breaks as needed in 7 therapy sessions.  Pt will tolerate 23 minutes of standing activity with LRAD/no device in 7 therapy sessions.  Pt will negotiate up and down 10x steps Reggie with use of a handrail in 7 therapy sessions.  Pt will perform standing dynamic balance activities with minimal postural sway in 7 therapy sessions.  Pt will tolerate multiple sets and reps of BLE exercises in 7 therapy sessions.   Pt will participate in 1x 23' PT session while maintaining 02 sats above 90% on RA in 7 therapy sessions.      PHYSICAL THERAPY Initial Assessment and AM  (Link to Caseload Tracking: PT Visit Days : 1  Acknowledge Orders  Time In/Out  PT Charge Capture  Rehab Caseload Tracker    José Antonio Kyle is a 45 y.o. male   PRIMARY DIAGNOSIS: Unstable angina (HCC)  Shortness of breath [R06.02]  Unstable angina (HCC) [I20.0]  Dyspnea on exertion [R06.09]  Acute chest pain [R07.9]  Chest pain, unspecified type [R07.9]  Chronic pulmonary embolism, unspecified pulmonary embolism type, unspecified whether acute cor pulmonale present (HCC) [I27.82]       Reason for Referral: Other abnormalities of gait and mobility (R26.89)  Inpatient: Payor: CRIST[employer] PA / Plan[place] [employer] [place] / Product Type: *No Product type* /     ASSESSMENT:     REHAB RECOMMENDATIONS:   Recommendation to date pending progress:  Setting:  Home Health Therapy    Equipment:    To Be Determined     ASSESSMENT:  Mr. Kyle is a 45 y.o. male presenting to PT following a hospitalization due to the above diagnosis. At baseline, pt ambulates independently lives in a single story home. At time of initial evaluation, pt presents below baseline with deficits in bed mobility, strength, transfers, balance, gait and activity tolerance limiting overall functional mobility. O2 sats were monitored throughout session, beginning on 
Advance Care Planning     Advance Care Planning Inpatient Note  Spiritual Care Department    Today's Date: 7/9/2025  Unit: [facility] 8 MED SURG    Received request from HealthCare Provider.  Upon review of chart and communication with care team, patient's decision making abilities are not in question.. Patient was/were present in the room during visit.    Goals of ACP Conversation:  Discuss advance care planning documents    Health Care Decision Makers:     No healthcare decision makers have been documented.    Summary:  No Decision Maker named by patient at this time    Advance Care Planning Documents (Patient Wishes):  Healthcare Power of /Advance Directive Appointment of Health Care Agent     Assessment:   introduced the HCPOA document to patient. Patient said his POA candidate would be arriving around 5:30 tonight and he wanted her to see the document before they signed it.  went over the document with the patient and left a copy with the patient.  offered to return tomorrow at patient's request to fill out and file the document.     Interventions:  Provided education on documents for clarity and greater understanding  Reviewed but did not complete ACP document    Care Preferences Communicated:   No    Outcomes/Plan:  ACP Discussion: Postponed    Electronically signed by SUSANNA BANG on 7/9/2025 at 10:32 AM        SPIRITUAL HEALTH  Note for Med/Surg Visit                  Room # 820/01    Name: José Antonio Kyle           Age: 45 y.o.    Gender: male          MRN: [id number]  Denominational: Yazidi       Preferred Language: English    Date: 07/09/25  Visit Time: Begin Time: (P) 0910 End Time : (P) 0930 Complexity of Encounter: (P) Low      Visit Summary:  met with patient through regular rounds. Patient said he was coping well with being in the hospital as he was used to his health issues. He spoke about his life and work and said he had no other major stresses. Patient 
Chart accessed for placement.  
Discharge instructions explained to patient.  Understanding verbalized of all discharge instructions, both written and verbal. Adequate time given for questions. All questions asked/answered. Discharge medications reviewed as appropriate and Rx's called to patient's pharmacy of record.     PIV's x 2 removed and bandage applied.     Patient discharged via ambulatory to private vehicle. No further needs noted. No oxygen in use.     
Hep gtt increased and 6000 unit bolus given at 2041. PTT taken at 2050 resulted at >200. Primary Aurelia Lopez NP notified of lab result.  Called pharmacy to verify if the hep gtt should be held or not due to recent bolus and increase. Discussed results with Ruperto in Pharmacy and due to change in dose we will recheck PTT @0145.        0200 PTT recheck resulted @92. No rate change per hep gtt protocol. 0800 PTT placed.   
Occupational Therapy Note:  Therapist is discharging patient from OT at this time due to decline in medical status and transfer to ICU. Please reconsult OT when MD deems patient appropriate for continued services.   Thank you.  Kin Stafford, OTR/L    
Physical Therapy Note:    Therapist is discontinuing acute PT services secondary to transfer to the CVICU. Please re-consult acute PT services when medically appropriate. Thank you,    Ceci Ramirez, PT, DPT      
Rapid Response called @1958. Primary RN received call from PCT @1954 who was in the room doing VS to report patient BP 64/51 and O2 in the mid 80's. Chest pain 10/10.  Once entering room Rapid response called patient placed on NONREBR, O2 improved BP slowly increased, ABG was obtained, Primary MD at bedside hep gtt increased to PE protocol. 6000 unit bolus ordered. Patient placed on airvo stat in the high 90's, and pt placed on bedrest. Patient states he was feeling better and understanding of situation.   
TRANSFER - IN REPORT:    Verbal report received from María Trejo RN, RN on José Antonio Kyle being received from IR for ordered procedure      Report consisted of patient’s Situation, Background, Assessment and Recommendations(SBAR).     Information from the following report(s) Procedure Summary and MAR was reviewed with the receiving nurse.    Opportunity for questions and clarification was provided.      Assessment completed upon patient’s arrival to unit and care assumed.      Received to Juana LAMBERT  
TRANSFER - IN REPORT:    Verbal report received from PETRA Alegria on José Antonio Kyle  being received from CV-ICU for routine progression of patient care      Report consisted of patient's Situation, Background, Assessment and   Recommendations(SBAR).     Information from the following report(s) Nurse Handoff Report was reviewed with the receiving nurse.    Opportunity for questions and clarification was provided.      Assessment completed upon patient's arrival to unit and care assumed.     
TRANSFER - IN REPORT:    Verbal report received from PETRA Rizo on José Antonio Kyle being received from ED for routine progression of patient care      Report consisted of patient’s Situation, Background, Assessment and Recommendations(SBAR).     Information from the following report(s) SBAR and Kardex was reviewed with the receiving nurse.    Opportunity for questions and clarification was provided.      Assessment completed upon patient’s arrival to unit and care assumed.    
TRANSFER - OUT REPORT:    Verbal report given to Mehrdad RN on José Antonio Kyle  being transferred to CVICU room 105 for change in patient condition ( )    Report consisted of patient's Situation, Background, Assessment and   Recommendations(SBAR).     Information from the following report(s) Nurse Handoff Report was reviewed with the receiving nurse.           Lines:   Peripheral IV 07/06/25 Right Antecubital (Active)   Site Assessment Clean, dry & intact 07/07/25 0010   Line Status Infusing 07/07/25 0010   Line Care Connections checked and tightened;Cap changed 07/07/25 0010   Phlebitis Assessment No symptoms 07/07/25 0010   Infiltration Assessment 0 07/07/25 0010   Alcohol Cap Used No 07/07/25 0010   Dressing Status Clean, dry & intact 07/07/25 0010   Dressing Type Transparent 07/07/25 0010   Dressing Intervention New 07/06/25 1800       Peripheral IV 07/06/25 Distal;Left Antecubital (Active)   Site Assessment Clean, dry & intact 07/07/25 0010   Line Status Flushed;Capped 07/07/25 0010   Line Care Connections checked and tightened 07/07/25 0010   Phlebitis Assessment No symptoms 07/07/25 0010   Infiltration Assessment 0 07/07/25 0010   Alcohol Cap Used Yes 07/07/25 0010   Dressing Status Clean, dry & intact 07/07/25 0010   Dressing Type Transparent 07/07/25 0010        Opportunity for questions and clarification was provided.      Patient transported with:  Monitor       
TRANSFER - OUT REPORT:    Verbal report given to Nuria LAMBERT on José Antonio yKle  being transferred to [facility] for routine progression of patient care       Report consisted of patient's Situation, Background, Assessment and   Recommendations(SBAR).     Information from the following report(s) Nurse Handoff Report was reviewed with the receiving nurse.           Lines:   Peripheral IV 07/06/25 Right Antecubital (Active)   Site Assessment Clean, dry & intact 07/08/25 1100   Line Status Flushed;Normal saline locked;Capped 07/08/25 1100   Line Care Cap changed;Connections checked and tightened 07/08/25 1100   Phlebitis Assessment No symptoms 07/08/25 1100   Infiltration Assessment 0 07/08/25 1100   Alcohol Cap Used Yes 07/08/25 1100   Dressing Status Clean, dry & intact 07/08/25 1100   Dressing Type Transparent 07/08/25 1100   Dressing Intervention New 07/06/25 1800       Peripheral IV 07/06/25 Distal;Left Antecubital (Active)   Site Assessment Clean, dry & intact 07/08/25 1100   Line Status Capped;Flushed;Normal saline locked 07/08/25 1100   Line Care Connections checked and tightened 07/08/25 1100   Phlebitis Assessment No symptoms 07/08/25 1100   Infiltration Assessment 0 07/08/25 1100   Alcohol Cap Used Yes 07/08/25 1100   Dressing Status Clean, dry & intact 07/08/25 1100   Dressing Type Transparent 07/08/25 1100        Opportunity for questions and clarification was provided.      Patient transported with:  O2 @ 5lpm       
IntraVENous PRN    magnesium sulfate 2000 mg in 50 mL IVPB premix  2,000 mg IntraVENous PRN    polyethylene glycol (GLYCOLAX) packet 17 g  17 g Oral Daily PRN    acetaminophen (TYLENOL) tablet 650 mg  650 mg Oral Q6H PRN    Or    acetaminophen (TYLENOL) suppository 650 mg  650 mg Rectal Q6H PRN    melatonin tablet 3 mg  3 mg Oral Nightly    sennosides-docusate sodium (SENOKOT-S) 8.6-50 MG tablet 1 tablet  1 tablet Oral BID    promethazine (PHENERGAN) tablet 25 mg  25 mg Oral Q6H PRN    Or    ondansetron (ZOFRAN) injection 4 mg  4 mg IntraVENous Q4H PRN    nicotine (NICODERM CQ) 21 MG/24HR 1 patch  1 patch TransDERmal Daily    ipratropium 0.5 mg-albuterol 2.5 mg (DUONEB) nebulizer solution 1 Dose  1 Dose Inhalation Q4H PRN    nitroGLYCERIN (NITROSTAT) SL tablet 0.4 mg  0.4 mg SubLINGual PRN    morphine (PF) injection 2 mg  2 mg IntraVENous Q4H PRN    heparin (porcine) injection 6,000 Units  80 Units/kg IntraVENous PRN    heparin (porcine) injection 3,000 Units  40 Units/kg IntraVENous PRN    heparin 25,000 units in dextrose 5% 250 mL (premix) infusion  5-30 Units/kg/hr IntraVENous Continuous       Data Review: data included in this note has been independently reviewed by the author     TELEMETRY: Sinus rhythm    Assessment/Plan:     Patient Active Problem List   Diagnosis    Benign essential HTN    URI, acute    Tobacco abuse    Pulmonary embolism (HCC)    Anxiety    Panic attacks    Hereditary spherocytosis    CAD (coronary artery disease)    Noncompliance    NSTEMI (non-ST elevated myocardial infarction) ([facility])    Dyslipidemia    Bilateral pulmonary embolism (HCC)    ETOH abuse    Pulmonary embolism, unspecified chronicity, unspecified pulmonary embolism type, unspecified whether acute cor pulmonale present ([facility])    Chest pain in adult    Recurrent deep vein thrombosis (DVT) ([facility])    Polycythemia    Smoker    Recurrent pulmonary embolism ([facility])    Chest pain    New onset seizure ([facility])    Unstable angina ([facility]) 
Units/kg/hr IntraVENous Continuous     Review of Systems: Comprehensive ROS negative except in HPI  Objective:   Blood pressure 104/72, pulse 66, temperature 97.5 °F (36.4 °C), temperature source Oral, resp. rate 13, height 1.88 m (6' 2\"), weight 76.2 kg (168 lb), SpO2 96%.   Intake/Output Summary (Last 24 hours) at 7/8/2025 0843  Last data filed at 7/7/2025 2318  Gross per 24 hour   Intake 503.34 ml   Output 275 ml   Net 228.34 ml     Physical Exam:   Constitutional:  the patient is well developed and in no acute distress  EENMT:  Sclera clear, pupils equal, oral mucosa moist  Respiratory: symmetric chest rise. clear  Cardiovascular:  RRR without M,G,R. There is no lower extremity edema.  Gastrointestinal: soft and non-tender; with positive bowel sounds.  Musculoskeletal: warm without cyanosis. Normal muscle tone.   Skin:  no jaundice or rashes, no  wounds   Neurologic: symmetric strength, fluent speech  Psychiatric:  calm, appropriate, oriented x 4    Imaging: I performed an independent interpretation of the patient's images.  CXR:     LAB:  Recent Labs     07/06/25  1535 07/06/25  2050 07/07/25  0201 07/07/25  1754 07/08/25  0411   WBC 15.0* 12.7* 11.3* 10.1 11.5*   HGB 17.2 16.4 16.5 15.0 14.5   HCT 47.8 45.7 47.1 43.2 42.5    208 153 172 173   INR 1.2 1.3  --   --   --      Recent Labs     07/06/25  1115 07/07/25  0201 07/08/25  0411    133* 135*   K 3.4* 4.3 4.1    104 104   CO2 22 19* 23   BUN 7 8 10   CREATININE 1.01 0.88 0.93   MG 1.6*  --   --    BILITOT 3.4*  --   --    AST 39*  --   --    ALT 24  --   --    ALKPHOS 100  --   --      No results for input(s): \"TROPHS\", \"NTPROBNP\", \"CRP\", \"ESR\" in the last 72 hours.  Recent Labs     07/06/25  1115 07/07/25  0201 07/08/25  0411   GLUCOSE 96 134* 111*      Microbiology:   [unfilled]  No results for input(s): \"CULTURE\" in the last 72 hours.  Recent Labs     07/06/25  1545   COVID19 NOT DETECTED     ECHO: 07/06/25    ECHO (TTE) COMPLETE 
100 mg/dL    Performed by: Rachel    Venous Blood Gas, POC    Collection Time: 07/06/25  8:10 PM   Result Value Ref Range    PH, VENOUS (POC) 7.33 7.32 - 7.42      PCO2, [place], POC 48.2 41 - 51 MMHG    PO2, VENOUS (POC) 21 (LL) mmHg    HCO3, Venous 25.6 22 - 29 MMOL/L    SO2, VENOUS (POC) 31.5 (L) 65 - 88 %    Base Deficit, Venous 0.9 mmol/L    Specimen type: VENOUS BLOOD      Performed by: Shefali     Critical Value Read Back     Troponin    Collection Time: 07/06/25  8:50 PM   Result Value Ref Range    Troponin T 315.0 (HH) 0 - 22 ng/L   CBC    Collection Time: 07/06/25  8:50 PM   Result Value Ref Range    WBC 12.7 (H) 4.3 - 11.1 K/uL    RBC 4.06 (L) 4.23 - 5.6 M/uL    Hemoglobin 16.4 13.6 - 17.2 g/dL    Hematocrit 45.7 41.1 - 50.3 %    .6 (H) 82 - 102 FL    MCH 40.4 (H) 26.1 - 32.9 PG    MCHC 35.9 (H) 31.4 - 35.0 g/dL    RDW 16.9 (H) 11.9 - 14.6 %    Platelets 208 150 - 450 K/uL    MPV 10.1 9.4 - 12.3 FL    nRBC 0.07 0.0 - 0.2 K/uL   APTT    Collection Time: 07/06/25  8:50 PM   Result Value Ref Range    APTT >200.0 (HH) 23.3 - 37.4 SEC   Protime-INR    Collection Time: 07/06/25  8:50 PM   Result Value Ref Range    Protime 17.1 (H) 11.3 - 14.9 sec    INR 1.3     Anti-XA, Heparin    Collection Time: 07/06/25  8:50 PM   Result Value Ref Range    Anti-XA Unfrac Heparin >1.1 (H) 0.3 - 0.7 IU/mL   APTT    Collection Time: 07/07/25  2:01 AM   Result Value Ref Range    APTT 92.0 (H) 23.3 - 37.4 SEC   CBC with Auto Differential    Collection Time: 07/07/25  2:01 AM   Result Value Ref Range    WBC 11.3 (H) 4.3 - 11.1 K/uL    RBC 4.16 (L) 4.23 - 5.6 M/uL    Hemoglobin 16.5 13.6 - 17.2 g/dL    Hematocrit 47.1 41.1 - 50.3 %    .2 (H) 82 - 102 FL    MCH 39.7 (H) 26.1 - 32.9 PG    MCHC 35.0 31.4 - 35.0 g/dL    RDW 17.1 (H) 11.9 - 14.6 %    Platelets 153 150 - 450 K/uL    MPV 9.5 9.4 - 12.3 FL    nRBC 0.07 0.0 - 0.2 K/uL    Differential Type AUTOMATED      Neutrophils % 59.4 43.0 
35.9 (H) 31.4 - 35.0 g/dL    RDW 16.9 (H) 11.9 - 14.6 %    Platelets 208 150 - 450 K/uL    MPV 10.1 9.4 - 12.3 FL    nRBC 0.07 0.0 - 0.2 K/uL   APTT    Collection Time: 07/06/25  8:50 PM   Result Value Ref Range    APTT >200.0 (HH) 23.3 - 37.4 SEC   Protime-INR    Collection Time: 07/06/25  8:50 PM   Result Value Ref Range    Protime 17.1 (H) 11.3 - 14.9 sec    INR 1.3     Anti-XA, Heparin    Collection Time: 07/06/25  8:50 PM   Result Value Ref Range    Anti-XA Unfrac Heparin >1.1 (H) 0.3 - 0.7 IU/mL   APTT    Collection Time: 07/07/25  2:01 AM   Result Value Ref Range    APTT 92.0 (H) 23.3 - 37.4 SEC   CBC with Auto Differential    Collection Time: 07/07/25  2:01 AM   Result Value Ref Range    WBC 11.3 (H) 4.3 - 11.1 K/uL    RBC 4.16 (L) 4.23 - 5.6 M/uL    Hemoglobin 16.5 13.6 - 17.2 g/dL    Hematocrit 47.1 41.1 - 50.3 %    .2 (H) 82 - 102 FL    MCH 39.7 (H) 26.1 - 32.9 PG    MCHC 35.0 31.4 - 35.0 g/dL    RDW 17.1 (H) 11.9 - 14.6 %    Platelets 153 150 - 450 K/uL    MPV 9.5 9.4 - 12.3 FL    nRBC 0.07 0.0 - 0.2 K/uL    Differential Type AUTOMATED      Neutrophils % 59.4 43.0 - 78.0 %    Lymphocytes % 28.7 13.0 - 44.0 %    Monocytes % 7.4 4.0 - 12.0 %    Eosinophils % 2.8 0.5 - 7.8 %    Basophils % 1.3 0.0 - 2.0 %    Immature Granulocytes % 0.4 0.0 - 5.0 %    Neutrophils Absolute 6.73 1.70 - 8.20 K/UL    Lymphocytes Absolute 3.25 0.50 - 4.60 K/UL    Monocytes Absolute 0.84 0.10 - 1.30 K/UL    Eosinophils Absolute 0.32 0.00 - 0.80 K/UL    Basophils Absolute 0.15 0.00 - 0.20 K/UL    Immature Granulocytes Absolute 0.05 0.0 - 0.5 K/UL   Basic Metabolic Panel w/ Reflex to MG    Collection Time: 07/07/25  2:01 AM   Result Value Ref Range    Sodium 133 (L) 136 - 145 mmol/L    Potassium 4.3 3.5 - 5.1 mmol/L    Chloride 104 98 - 107 mmol/L    CO2 19 (L) 20 - 29 mmol/L    Anion Gap 10 7 - 16 mmol/L    Glucose 134 (H) 70 - 99 mg/dL    BUN 8 6 - 23 MG/DL    Creatinine 0.88 0.80 - 1.30 MG/DL    Est, Glom Filt Rate >90 >60

## 2025-07-20 ENCOUNTER — CLINICAL DOCUMENTATION (OUTPATIENT)
Dept: ONCOLOGY | Age: 45
End: 2025-07-20

## 2025-08-06 ENCOUNTER — CLINICAL DOCUMENTATION (OUTPATIENT)
Dept: ONCOLOGY | Age: 45
End: 2025-08-06

## 2025-08-06 PROBLEM — R79.89 ELEVATED TROPONIN: Status: RESOLVED | Noted: 2025-07-07 | Resolved: 2025-08-06

## (undated) DEVICE — (D)PREP SKN CHLRAPRP APPL 26ML -- CONVERT TO ITEM 371833

## (undated) DEVICE — BUTTON SWITCH PENCIL BLADE ELECTRODE, HOLSTER: Brand: EDGE

## (undated) DEVICE — (D)STRIP SKN CLSR 0.5X4IN WHT --

## (undated) DEVICE — STERILE HOOK LOCK LATEX FREE ELASTIC BANDAGE 4INX5YD: Brand: HOOK LOCK™

## (undated) DEVICE — DRAPE,HAND,STERILE: Brand: MEDLINE

## (undated) DEVICE — DISPOSABLE TOURNIQUET CUFF SINGLE BLADDER, DUAL PORT AND QUICK CONNECT CONNECTOR: Brand: COLOR CUFF

## (undated) DEVICE — SURGICAL PROCEDURE PACK BASIC ST FRANCIS

## (undated) DEVICE — REM POLYHESIVE ADULT PATIENT RETURN ELECTRODE: Brand: VALLEYLAB

## (undated) DEVICE — SUT ETHLN 4-0 18IN PS2 BLK --

## (undated) DEVICE — K WIRE

## (undated) DEVICE — SUTURE PDS II SZ 3-0 L27IN ABSRB VLT L26MM SH 1/2 CIR Z316H

## (undated) DEVICE — MASTISOL ADHESIVE LIQ 2/3ML

## (undated) DEVICE — SPLINT ORTH W3XL12IN RL FRM WRINKLE FREE INTLOK PERFRMANCE

## (undated) DEVICE — PADDING CAST COHESIVE 4 YDX3 IN HND TEARABLE COTTON SPEC 100

## (undated) DEVICE — DRILL

## (undated) DEVICE — DRAPE XR C ARM 41X74IN LF --

## (undated) DEVICE — DRAPE, FILM SHEET, 44X65 STERILE: Brand: MEDLINE

## (undated) DEVICE — SOLUTION IV 1000ML 0.9% SOD CHL

## (undated) DEVICE — STERILE HOOK LOCK LATEX FREE ELASTIC BANDAGE 3INX5YD: Brand: HOOK LOCK™

## (undated) DEVICE — INTENDED FOR TISSUE SEPARATION, AND OTHER PROCEDURES THAT REQUIRE A SHARP SURGICAL BLADE TO PUNCTURE OR CUT.: Brand: BARD-PARKER ® STAINLESS STEEL BLADES

## (undated) DEVICE — 2000CC GUARDIAN II: Brand: GUARDIAN